# Patient Record
Sex: FEMALE | Race: BLACK OR AFRICAN AMERICAN | NOT HISPANIC OR LATINO | Employment: OTHER | URBAN - METROPOLITAN AREA
[De-identification: names, ages, dates, MRNs, and addresses within clinical notes are randomized per-mention and may not be internally consistent; named-entity substitution may affect disease eponyms.]

---

## 2017-02-15 RX ORDER — SIMVASTATIN 40 MG
TABLET ORAL
Qty: 90 TABLET | Refills: 0 | Status: SHIPPED | OUTPATIENT
Start: 2017-02-15 | End: 2017-07-03 | Stop reason: SDUPTHER

## 2017-03-23 ENCOUNTER — OFFICE VISIT (OUTPATIENT)
Dept: ORTHOPEDIC SURGERY | Facility: CLINIC | Age: 77
End: 2017-03-23

## 2017-03-23 VITALS — BODY MASS INDEX: 37.17 KG/M2 | TEMPERATURE: 97.6 F | WEIGHT: 202 LBS | HEIGHT: 62 IN

## 2017-03-23 DIAGNOSIS — G89.29 CHRONIC PAIN OF LEFT KNEE: Primary | ICD-10-CM

## 2017-03-23 DIAGNOSIS — M17.12 ARTHRITIS OF LEFT KNEE: ICD-10-CM

## 2017-03-23 DIAGNOSIS — Z96.651 STATUS POST TOTAL KNEE REPLACEMENT, RIGHT: ICD-10-CM

## 2017-03-23 DIAGNOSIS — M25.562 CHRONIC PAIN OF LEFT KNEE: Primary | ICD-10-CM

## 2017-03-23 PROCEDURE — 73565 X-RAY EXAM OF KNEES: CPT | Performed by: NURSE PRACTITIONER

## 2017-03-23 PROCEDURE — 73560 X-RAY EXAM OF KNEE 1 OR 2: CPT | Performed by: NURSE PRACTITIONER

## 2017-03-23 PROCEDURE — 20610 DRAIN/INJ JOINT/BURSA W/O US: CPT | Performed by: NURSE PRACTITIONER

## 2017-03-23 RX ORDER — LIDOCAINE HYDROCHLORIDE 10 MG/ML
2 INJECTION, SOLUTION INFILTRATION; PERINEURAL
Status: COMPLETED | OUTPATIENT
Start: 2017-03-23 | End: 2017-03-23

## 2017-03-23 RX ORDER — METHYLPREDNISOLONE ACETATE 80 MG/ML
80 INJECTION, SUSPENSION INTRA-ARTICULAR; INTRALESIONAL; INTRAMUSCULAR; SOFT TISSUE
Status: COMPLETED | OUTPATIENT
Start: 2017-03-23 | End: 2017-03-23

## 2017-03-23 RX ORDER — BUPIVACAINE HYDROCHLORIDE 5 MG/ML
2 INJECTION, SOLUTION PERINEURAL
Status: COMPLETED | OUTPATIENT
Start: 2017-03-23 | End: 2017-03-23

## 2017-03-23 RX ADMIN — LIDOCAINE HYDROCHLORIDE 2 ML: 10 INJECTION, SOLUTION INFILTRATION; PERINEURAL at 10:08

## 2017-03-23 RX ADMIN — BUPIVACAINE HYDROCHLORIDE 2 ML: 5 INJECTION, SOLUTION PERINEURAL at 10:08

## 2017-03-23 RX ADMIN — METHYLPREDNISOLONE ACETATE 80 MG: 80 INJECTION, SUSPENSION INTRA-ARTICULAR; INTRALESIONAL; INTRAMUSCULAR; SOFT TISSUE at 10:08

## 2017-03-23 NOTE — PATIENT INSTRUCTIONS
[facility]    KNEE HOME EXERCISES      It is important that you maintain and possibly improve your strength and range of motion of your knee.      •  Walk frequently for short intervals  •  Using a cane or walker to allow you to walk safely if needed  •  Avoid sitting for long periods of time to avoid cramping and swelling of your leg   •  Do exercises either on a bed or in a chair.  •  If any of the exercises cause you pain, either eliminate that exercise or decrease          the motion or repetitions      Start exercises with 10 repetitions and increase to 30 repetitions.  Do two sets of each exercise each day    ANKLE PUMPS    1. Move your feet up and down as if you are pumping on a gas pedal       STRAIGHT LEG RAISES    1. Lie flat with your injured leg straight.  Keep the other leg bent.  2. Tighten the injured leg's thigh muscle.  3. Lift leg, with knee locked in the straight position no higher than the other knee for  seconds  4. Lower leg slowly. Rest for 5 seconds      SHORT ARC QUAD    1. Lie with both knees bent over a pillow  2. Straighten both knees  3. Hold 5 seconds  4. Bend knees back to starting position and repeat sequence       QUADRICEPS     1. Lie flat with your injured let straight.  Keep the other leg bent.  2. Tighten the injured leg's thigh muscle by trying to move your kneecap toward the  thigh.  3. Make your leg as stiff as you can.  4. Hold for 5 seconds and relax for 5 seconds        HAMSTRING STRETCH    1. Lie flat with your injured leg straight. Keep the other leg bent  2. Press your heel of your injured leg into the floor for 5 seconds       OR  1. Sit with injured leg out straight.   2. Loop a sheet around the ball of foot and toes.  3. Gently pull on the sheet, keeping the leg straight  4. Hold for 10-30 seconds      KNEE FLEXION-EXTENSION SITTING     1. Sit with injured let bent as shown  2. Straighten leg at the knee  3. Hold 5 seconds  4. Bend knee back  to starting position   5. Rest for 2-5 seconds and repeat sequence       HEEL SLIDES     1. Lie on back with legs straight  2. Slide heels toward buttocks  3. Return to starting position       HEEL SLIDS WITH SHEET    1. Lie on your back with a sheet wrapped around your foot  2. Pull on the sheet to assist you in bending your knee and sliding your heel toward  your buttocks  3. Hold for 5 seconds        KNEE FLEXION STRETCH    1. Sit in a chair  2. Bend bad knee back as far as you can   3. Hold stretch for 5-10 seconds      CHAIR PUSH UPS    1. Sit in a chair with arm rests  2. Place hands on armrests  3. Straighten arms, raising buttocks up off of chair         WALL SLIDES    1. Stand with your back and head against a wall.    2. Keep your feet shoulder width apart and 6-12 inches from the wall  3. Slowly slide down the wall until your knees are slightly bent.    4. Hold for 5 seconds and then slowly slide back up  5. As you get stronger, then you can slowly increase the bend in your knees, but do  not drop your buttocks below the level of your knees       STEP UPS    1. Stand with your foot on your injured leg on a 3-5 inch support (such as a block of  wood)  2. Place other foot on the floor  3. Shift your weight onto the foot on the block and try to straighten the knee and  raising the other foot off of the floor  4. Slowly lower your foot until only the heel touches the floor

## 2017-03-23 NOTE — PROGRESS NOTES
Knee follow up      Patient: Loulou Collier  YOB: 1940    Chief Complaints: Left Knee pain    Subjective:    History of Present Illness: Pt gets intermittent  injections with diminishing relief. Is here for repeat injection. Understands options. The pain is a generalized joint line tenderness.  It has been progressive in nature but remains intermittent.  Worsened by prolonged standing or walking and squatting activities. Has had improvement in the past with ice/heat, rest, and injections. KNEE: TIMING:  The pain is described as ACUTE on CHRONIC.  LOCATION: medial joint line tenderness. AGGRAVATING FACTORS:  Is worsened by prolonged standing, sitting, walking and squatting activities.  ASSOCIATED SYMPTOMS:  swelling, tenderness, and aching. OTHER SYMPTOMS denies popping, locking or catching. RELIEVING FACTORS:  Previous treatment has included cortisone injections  OTC Tylenol  OTC meds/NSAIDS  activtiy modification  ice/heat/rest., Has been getting injections with good relief in the past, but now is having limited relief.   Had an episode in the middle of the night a few days ago when she got up to the bathroom with sudden pain and swelling, so bad she almost went to the ER. Swelling and pain have improved since then with linaments and rest but still severely painful on a daily basis at this point and wants to discuss surgery.    Allergies:   Allergies   Allergen Reactions   • Cephalosporins    • Penicillins        Medications:   Home Medications:  Current Outpatient Prescriptions on File Prior to Visit   Medication Sig   • acetaminophen (TYLENOL) 500 MG tablet Take 500 mg by mouth every 6 (six) hours as needed.   • Glucosamine-Chondroit-Vit C-Mn (GLUCOSAMINE CHONDR 500 COMPLEX) capsule Take  by mouth.   • hydrochlorothiazide (MICROZIDE) 12.5 MG capsule TAKE 1 CAPSULE BY MOUTH EVERY DAY   • meloxicam (MOBIC) 15 MG tablet TAKE 1 TABLET BY MOUTH EVERY DAY WITH FOOD   • metoprolol succinate XL (TOPROL-XL)  50 MG 24 hr tablet TAKE 1 TABLET BY MOUTH EVERY DAY   • simvastatin (ZOCOR) 40 MG tablet TAKE 1 TABLET BY MOUTH EVERY NIGHT AT BEDTIME     No current facility-administered medications on file prior to visit.      Current Medications:  Scheduled Meds:  Continuous Infusions:  No current facility-administered medications for this visit.   PRN Meds:.    I have reviewed the patient's medical history in detail and updated the computerized patient record.  Review and summarization of old records include:    Past Medical History:   Diagnosis Date   • Hypertension         Past Surgical History:   Procedure Laterality Date   • BREAST BIOPSY     • HYSTERECTOMY     • KNEE SURGERY          Social History     Occupational History   • Not on file.     Social History Main Topics   • Smoking status: Never Smoker   • Smokeless tobacco: Never Used   • Alcohol use No   • Drug use: No   • Sexual activity: Not on file    Social History     Social History Narrative        Family History   Problem Relation Age of Onset   • Hypertension Other    • Cancer Other    • Diabetes Other    • No Known Problems Mother    • No Known Problems Father    • No Known Problems Sister    • No Known Problems Brother    • No Known Problems Daughter    • No Known Problems Son    • No Known Problems Maternal Grandmother    • No Known Problems Paternal Grandmother    • No Known Problems Maternal Aunt    • No Known Problems Paternal Aunt    • BRCA 1/2 Neg Hx    • Breast cancer Neg Hx    • Colon cancer Neg Hx    • Endometrial cancer Neg Hx    • Ovarian cancer Neg Hx        ROS: 14 point review of systems was performed and was negative except for documented findings in HPI and today's encounter.     Allergies:   Allergies   Allergen Reactions   • Cephalosporins    • Penicillins      Constitutional:  Denies fever, shaking or chills   Eyes:  Denies change in visual acuity   HENT:  Denies nasal congestion or sore throat   Respiratory:  Denies cough or shortness of  breath   Cardiovascular:  Denies chest pain or severe LE edema   GI:  Denies abdominal pain, nausea, vomiting, bloody stools or diarrhea   Musculoskeletal:  Numbness, tingling, or loss of motor function only as noted above in history of present illness.  : Denies painful urination or hematuria  Integument:  Denies rash, lesion or ulceration   Neurologic:  Denies headache or focal weakness  Endocrine:  Denies lymphadenopathy  Psych:  Denies confusion or change in mental status   Hem:  Denies active bleeding    Physical Exam:  Body mass index is 36.95 kg/(m^2).  Vitals:    03/23/17 1008   Temp: 97.6 °F (36.4 °C)     Vital Signs:  reviewed  Constitutional: Awake alert and oriented x3, well developed, no acute distress, non-toxic appearance.  EYES: symmetric, sclera clear  ENT:  Normocephalic, Atraumatic.   Respiratory:  No respiratory distress, No wheezing  CV: pulse regular, no palpitations or pallor.  GI:  Abdomen soft, non-tender.   Vascular:  Intact distal pulses, No cyanosis, no signs or symptoms of DVT.  Neurologic: Sensation grossly intact to the involved extremity, No focal deficits noted.   Neck: No tenderness, Supple.  Integument: warm, dry, no ulcerations.   Psychiatric:  Oriented, no pathological affect.  Musculoskeletal:    Left knee(s):  Painful gait with a subtle limp, positive for synovitis, mod-severe swelling, joint effusion with crepitation.  Lachman negative  Posterior drawer negative  Juliette's negative  Patellofemoral grind +  Sensation grossly intact to light touch throughout the lower extremity  Skin is intact  Distal pulses are palpable  No signs or symptoms of DVT  Right knee doing well with good rom and weight bearing, n/v intact, no swelling, no complaints.       Diagnostic Data:     Imaging was done today and discussed at length with the patient:    Indication: pain related symptoms,  Views: 3V AP, LAT & 40 degree PA left knee(s)   Findings: severe end-stage arthritis (bone on bone,  subchondral sclerosis/cysts, osteophytes), no obvious fracture or acute changes, S/P right  Total Knee Replacement in good position and alignment  Comparison views: viewed last xray done in the office.     Procedure:  Large Joint Arthrocentesis  Date/Time: 3/23/2017 10:08 AM  Consent given by: patient  Site marked: site marked  Timeout: Immediately prior to procedure a time out was called to verify the correct patient, procedure, equipment, support staff and site/side marked as required   Supporting Documentation  Indications: pain and joint swelling   Procedure Details  Location: knee - L knee  Preparation: Patient was prepped and draped in the usual sterile fashion  Needle size: 22 G  Approach: anterolateral  Medications administered: 2 mL bupivacaine; 2 mL lidocaine 1 %; 80 mg methylPREDNISolone acetate 80 MG/ML  Patient tolerance: patient tolerated the procedure well with no immediate complications          Assessment. Severe arthritis left knee(s). S/P R TKA doing well.       Plan: Is to proceed with injection  Follow up as indicated.  Ice, elevate, and rest as needed.  Additional interventions include: Biomechanics of pertinent body area discussed.  Risks, benefits, alternatives, comparisons, and complications of accepted medicines, injections, recommendations, surgical procedures, and therapies explained and education provided in laymen's terms. The patient was given the opportunity to ask questions and they were answerved to their satisfaction.   Natural history and expected course discussed. Questions answered.  Advice on benefits of, and types of regular/moderate exercise.  Lifestyle measures for weight loss with biomechanical explanations for weight loss and how this affects orthopedic condition.  Cortisone Injection. See procedure note.  OTC analgesics as needed with dosage warning and instructions given.  Medications per orders; safety, precautions, and warnings given.  30 min spent face to face with  patient >25 min spent counseling about natural history and expected course of assessed complaint. Questions answered.  TKA: Continuation of conservative management vs. TKA: I reviewed anatomy of a total knee arthroplasty in laymen's terms, as well as typical postoperative recovery and possibly 6-12 months for maximal recovery, and possible need for rehabilitation stay after hospitalization. We also discussed risks, benefits, alternatives, and limitations of procedure with risks including but not limited to neurovascular damage, bleeding, infection, malalignment, chronic pian, failure of implants, osteolysis, loosening of implants, loss of motion, weakness, stiffness, instability, DVT, pulmonary embolus, death, stroke, complex regional pain syndrome, myocardial infarction, and need for additional procedures. Concept of substitution vs. replacement discussed.  No guarantees were given regarding results of surgery.  Patient verbalized understanding, and was given the opportunity to ask and have all questions answered today.   Cortisone injection given for relief until surgery. (see procedure note)  Last time did rehab at [place] went well.  3/23/2017  NAHEDR

## 2017-06-19 ENCOUNTER — OFFICE VISIT (OUTPATIENT)
Dept: INTERNAL MEDICINE | Facility: CLINIC | Age: 77
End: 2017-06-19

## 2017-06-19 VITALS
HEIGHT: 63 IN | WEIGHT: 204 LBS | DIASTOLIC BLOOD PRESSURE: 82 MMHG | HEART RATE: 66 BPM | SYSTOLIC BLOOD PRESSURE: 128 MMHG | BODY MASS INDEX: 36.14 KG/M2

## 2017-06-19 DIAGNOSIS — E78.00 ELEVATED CHOLESTEROL: ICD-10-CM

## 2017-06-19 DIAGNOSIS — I10 BENIGN ESSENTIAL HTN: Primary | ICD-10-CM

## 2017-06-19 DIAGNOSIS — M17.12 PRIMARY OSTEOARTHRITIS OF LEFT KNEE: ICD-10-CM

## 2017-06-19 PROBLEM — G89.29 CHRONIC PAIN OF LEFT KNEE: Status: RESOLVED | Noted: 2017-03-23 | Resolved: 2017-06-19

## 2017-06-19 PROBLEM — M17.10 PRIMARY OSTEOARTHRITIS OF KNEE: Status: ACTIVE | Noted: 2017-06-19

## 2017-06-19 PROBLEM — M25.562 CHRONIC PAIN OF LEFT KNEE: Status: RESOLVED | Noted: 2017-03-23 | Resolved: 2017-06-19

## 2017-06-19 PROCEDURE — 99214 OFFICE O/P EST MOD 30 MIN: CPT | Performed by: INTERNAL MEDICINE

## 2017-06-19 NOTE — PROGRESS NOTES
Subjective   Loulou Collier is a 77 y.o. female.     History of Present Illness she is here today for medical clearance for left total knee replacement to be done on 25 July.  She really is in excellent health.  Her review systems is totally negative.  Specifically she denies any dyspnea on exertion, PND, chest pain, or swelling in the ankles.  She denies any dizziness or focal neurologic symptoms.  Her review systems is totally negative.        Review of Systems   Constitutional: Negative for activity change, appetite change, fatigue and unexpected weight change.   HENT: Negative for trouble swallowing.    Respiratory: Negative for cough, chest tightness, shortness of breath and wheezing.    Cardiovascular: Negative for chest pain, palpitations and leg swelling.   Gastrointestinal: Negative for abdominal pain, anal bleeding, blood in stool, constipation, diarrhea, nausea and vomiting.   Genitourinary: Negative for difficulty urinating, dysuria, flank pain, frequency, hematuria and vaginal bleeding.   Musculoskeletal: Positive for arthralgias. Negative for back pain and gait problem.   Neurological: Negative for dizziness, syncope, facial asymmetry, speech difficulty, weakness, numbness and headaches.   Psychiatric/Behavioral: Negative for confusion and dysphoric mood. The patient is not nervous/anxious.        Objective   Physical Exam   Constitutional: She is oriented to person, place, and time. Vital signs are normal. She appears well-developed and well-nourished. She is active. She does not appear ill.   Eyes: Conjunctivae are normal.   Fundoscopic exam:       The right eye shows no AV nicking, no exudate and no hemorrhage.        The left eye shows no AV nicking, no exudate and no hemorrhage.   Neck: Carotid bruit is not present. No thyroid mass and no thyromegaly present.   Cardiovascular: Normal rate, regular rhythm, S1 normal and S2 normal.  Exam reveals no S3 and no S4.    No murmur heard.  Pulses:        Posterior tibial pulses are 2+ on the right side, and 2+ on the left side.   Pulmonary/Chest: No tachypnea. No respiratory distress. She has no decreased breath sounds. She has no wheezes. She has no rhonchi. She has no rales.   Abdominal: Soft. Normal appearance and bowel sounds are normal. She exhibits no abdominal bruit and no mass. There is no hepatosplenomegaly. There is no tenderness.       Vascular Status -  Her exam exhibits no right foot edema. Her exam exhibits no left foot edema.  Neurological: She is alert and oriented to person, place, and time. Gait normal.   Reflex Scores:       Bicep reflexes are 2+ on the right side.  Psychiatric: She has a normal mood and affect. Her speech is normal and behavior is normal. Judgment and thought content normal. Cognition and memory are normal.       Assessment/Plan August 2016 lab was totally normal including LDL cholesterol    Assessment #1 hypertension-good control #2 hypercholesterolemia-good control #3 osteoarthritis-she is a good candidate for left total knee replacement.    Plan #1 no change medication #2 fax surgical clearance to her orthopedist.

## 2017-07-03 RX ORDER — SIMVASTATIN 40 MG
TABLET ORAL
Qty: 90 TABLET | Refills: 0 | Status: SHIPPED | OUTPATIENT
Start: 2017-07-03 | End: 2017-07-14

## 2017-07-12 ENCOUNTER — HOSPITAL ENCOUNTER (OUTPATIENT)
Dept: CARDIOLOGY | Facility: HOSPITAL | Age: 77
Discharge: HOME | End: 2017-07-12
Attending: ORTHOPAEDIC SURGERY | Admitting: ORTHOPAEDIC SURGERY

## 2017-07-12 DIAGNOSIS — Z01.818 PRE-OP TESTING: ICD-10-CM

## 2017-07-12 PROCEDURE — 93005 ELECTROCARDIOGRAM TRACING: CPT | Performed by: ORTHOPAEDIC SURGERY

## 2017-07-12 PROCEDURE — 93010 ELECTROCARDIOGRAM REPORT: CPT | Performed by: INTERNAL MEDICINE

## 2017-07-13 ENCOUNTER — TELEPHONE (OUTPATIENT)
Dept: ORTHOPEDIC SURGERY | Facility: CLINIC | Age: 77
End: 2017-07-13

## 2017-07-13 NOTE — TELEPHONE ENCOUNTER
PLEASE PUT ORDER IN FOR PATIENT, SHE IS HAVING SX AUG 2, AND PT IS HAVING PATS TOMRW AND NEED ORDERS,       THANKS,    GINGER

## 2017-07-14 ENCOUNTER — PREP FOR SURGERY (OUTPATIENT)
Dept: OTHER | Facility: HOSPITAL | Age: 77
End: 2017-07-14

## 2017-07-14 ENCOUNTER — APPOINTMENT (OUTPATIENT)
Dept: PREADMISSION TESTING | Facility: HOSPITAL | Age: 77
End: 2017-07-14

## 2017-07-14 VITALS
TEMPERATURE: 97.9 F | RESPIRATION RATE: 16 BRPM | SYSTOLIC BLOOD PRESSURE: 136 MMHG | DIASTOLIC BLOOD PRESSURE: 85 MMHG | OXYGEN SATURATION: 95 % | HEIGHT: 62 IN | WEIGHT: 206 LBS | HEART RATE: 74 BPM | BODY MASS INDEX: 37.91 KG/M2

## 2017-07-14 DIAGNOSIS — M17.12 ARTHRITIS OF LEFT KNEE: Primary | ICD-10-CM

## 2017-07-14 DIAGNOSIS — M17.12 ARTHRITIS OF LEFT KNEE: ICD-10-CM

## 2017-07-14 LAB
ANION GAP SERPL CALCULATED.3IONS-SCNC: 13 MMOL/L
BACTERIA UR QL AUTO: ABNORMAL /HPF
BILIRUB UR QL STRIP: NEGATIVE
BUN BLD-MCNC: 23 MG/DL (ref 8–23)
BUN/CREAT SERPL: 20.9 (ref 7–25)
CALCIUM SPEC-SCNC: 9.2 MG/DL (ref 8.6–10.5)
CHLORIDE SERPL-SCNC: 103 MMOL/L (ref 98–107)
CLARITY UR: CLEAR
CO2 SERPL-SCNC: 27 MMOL/L (ref 22–29)
COLOR UR: YELLOW
CREAT BLD-MCNC: 1.1 MG/DL (ref 0.57–1)
DEPRECATED RDW RBC AUTO: 51.4 FL (ref 37–54)
ERYTHROCYTE [DISTWIDTH] IN BLOOD BY AUTOMATED COUNT: 16.4 % (ref 11.7–13)
GFR SERPL CREATININE-BSD FRML MDRD: 59 ML/MIN/1.73
GLUCOSE BLD-MCNC: 95 MG/DL (ref 65–99)
GLUCOSE UR STRIP-MCNC: NEGATIVE MG/DL
HCT VFR BLD AUTO: 39.6 % (ref 35.6–45.5)
HGB BLD-MCNC: 12.6 G/DL (ref 11.9–15.5)
HGB UR QL STRIP.AUTO: NEGATIVE
HYALINE CASTS UR QL AUTO: ABNORMAL /LPF
KETONES UR QL STRIP: NEGATIVE
LEUKOCYTE ESTERASE UR QL STRIP.AUTO: ABNORMAL
MCH RBC QN AUTO: 27.3 PG (ref 26.9–32)
MCHC RBC AUTO-ENTMCNC: 31.8 G/DL (ref 32.4–36.3)
MCV RBC AUTO: 85.9 FL (ref 80.5–98.2)
NITRITE UR QL STRIP: NEGATIVE
PH UR STRIP.AUTO: 5.5 [PH] (ref 5–8)
PLATELET # BLD AUTO: 208 10*3/MM3 (ref 140–500)
PMV BLD AUTO: 10.8 FL (ref 6–12)
POTASSIUM BLD-SCNC: 3.8 MMOL/L (ref 3.5–5.2)
PROT UR QL STRIP: NEGATIVE
RBC # BLD AUTO: 4.61 10*6/MM3 (ref 3.9–5.2)
RBC # UR: ABNORMAL /HPF
REF LAB TEST METHOD: ABNORMAL
SODIUM BLD-SCNC: 143 MMOL/L (ref 136–145)
SP GR UR STRIP: 1.02 (ref 1–1.03)
SQUAMOUS #/AREA URNS HPF: ABNORMAL /HPF
UROBILINOGEN UR QL STRIP: ABNORMAL
WBC NRBC COR # BLD: 5.53 10*3/MM3 (ref 4.5–10.7)
WBC UR QL AUTO: ABNORMAL /HPF

## 2017-07-14 PROCEDURE — 81001 URINALYSIS AUTO W/SCOPE: CPT | Performed by: ORTHOPAEDIC SURGERY

## 2017-07-14 PROCEDURE — 36415 COLL VENOUS BLD VENIPUNCTURE: CPT

## 2017-07-14 PROCEDURE — 87086 URINE CULTURE/COLONY COUNT: CPT | Performed by: ORTHOPAEDIC SURGERY

## 2017-07-14 PROCEDURE — 85027 COMPLETE CBC AUTOMATED: CPT | Performed by: ORTHOPAEDIC SURGERY

## 2017-07-14 PROCEDURE — 80048 BASIC METABOLIC PNL TOTAL CA: CPT | Performed by: ORTHOPAEDIC SURGERY

## 2017-07-14 RX ORDER — MELOXICAM 15 MG/1
15 TABLET ORAL EVERY MORNING
COMMUNITY
End: 2017-08-04 | Stop reason: HOSPADM

## 2017-07-14 RX ORDER — SIMVASTATIN 40 MG
40 TABLET ORAL NIGHTLY
COMMUNITY
End: 2018-12-05 | Stop reason: SDUPTHER

## 2017-07-14 RX ORDER — METOPROLOL SUCCINATE 50 MG/1
50 TABLET, EXTENDED RELEASE ORAL EVERY MORNING
COMMUNITY
End: 2017-08-28

## 2017-07-14 RX ORDER — HYDROCHLOROTHIAZIDE 12.5 MG/1
12.5 TABLET ORAL EVERY MORNING
COMMUNITY
End: 2017-09-19 | Stop reason: DRUGHIGH

## 2017-07-14 NOTE — DISCHARGE INSTRUCTIONS
Take the following medications the morning of surgery with a small sip of water:  METOPROLOL        General Instructions:  • Do not eat solid food after midnight the night before surgery.  • You may drink clear liquids day of surgery but must stop at least one hour before your hospital arrival time.  • It is beneficial for you to have a clear drink that contains carbohydrates the day of surgery.  We suggest a 20 ounce bottle of Gatorade or Powerade for non-diabetic patients or a 20 ounce bottle of G2 or Powerade Zero for diabetic patients. (Pediatric patients, are not advised to drink a 20 ounce carbohydrate drink)    Clear liquids are liquids you can see through.  Nothing red in color.     Plain water                               Sports drinks  Sodas                                   Gelatin (Jell-O)  Fruit juices without pulp such as white grape juice and apple juice  Popsicles that contain no fruit or yogurt  Tea or coffee (no cream or milk added)  Gatorade / Powerade  G2 / Powerade Zero    • Infants may have breast milk up to four hours before surgery.  • Infants drinking formula may drink formula up to six hours before surgery.   • Patients who avoid smoking, chewing tobacco and alcohol for 4 weeks prior to surgery have a reduced risk of post-operative complications.  Quit smoking as many days before surgery as you can.  • Do not smoke, use chewing tobacco or drink alcohol the day of surgery.   • If applicable bring your C-PAP/ BI-PAP machine.  • Bring any papers given to you in the doctor’s office.  • Wear clean comfortable clothes and socks.  • Do not wear contact lenses or make-up.  Bring a case for your glasses.   • Bring crutches or walker if applicable.  • Leave all other valuables and jewelry at home.  • The Pre-Admission Testing nurse will instruct you to bring medications if unable to obtain an accurate list in Pre-Admission Testing.        If you were given a blood bank ID arm band remember to bring  it with you the day of surgery.    Preventing a Surgical Site Infection:  • For 2 to 3 days before surgery, avoid shaving with a razor because the razor can irritate skin and make it easier to develop an infection.  • The night prior to surgery sleep in a clean bed with clean clothing.  Do not allow pets to sleep with you.  • Shower on the morning of surgery using a fresh bar of anti-bacterial soap (such as Dial) and clean washcloth.  Dry with a clean towel and dress in clean clothing.  • Ask your surgeon if you will be receiving antibiotics prior to surgery.  • Make sure you, your family, and all healthcare providers clean their hands with soap and water or an alcohol based hand  before caring for you or your wound.    Day of surgery:  Upon arrival, a Pre-op nurse and Anesthesiologist will review your health history, obtain vital signs, and answer questions you may have.  The only belongings needed at this time will be your home medications and if applicable your C-PAP/BI-PAP machine.  If you are staying overnight your family can leave the rest of your belongings in the car and bring them to your room later.  A Pre-op nurse will start an IV and you may receive medication in preparation for surgery, including something to help you relax.  Your family will be able to see you in the Pre-op area.  While you are in surgery your family should notify the waiting room  if they leave the waiting room area and provide a contact phone number.    Please be aware that surgery does come with discomfort.  We want to make every effort to control your discomfort so please discuss any uncontrolled symptoms with your nurse.   Your doctor will most likely have prescribed pain medications.      If you are going home after surgery you will receive individualized written care instructions before being discharged.  A responsible adult must drive you to and from the hospital on the day of your surgery and stay with you  for 24 hours.    If you are staying overnight following surgery, you will be transported to your hospital room following the recovery period.  [facility] has all private rooms.    If you have any questions please call Pre-Admission Testing at [phone].  Deductibles and co-payments are collected on the day of service. Please be prepared to pay the required co-pay, deductible or deposit on the day of service as defined by your plan.    2% CHLORAHEXIDINE GLUCONATE* CLOTH  Preparing or “prepping” skin before surgery can reduce the risk of infection at the surgical site. To make the process easier, [facility] has chosen disposable cloths moistened with a rinse-free, 2% Chlorhexidine Gluconate (CHG) antiseptic solution. The steps below outline the prepping process and should be carefully followed.        Use the prep cloth on the area that is circled in the diagram             Directions Night before Surgery  1) Shower using a fresh bar of anti-bacterial soap (such as Dial) and clean washcloth.  Use a clean towel to completely dry your skin.  2) Do not use any lotions, oils or creams on your skin.  3) Open the package and remove 1 cloth, wipe your skin for 30 seconds in a circular motion.  Allow to dry for 3 minutes.  4) Repeat #3 with second cloth.  5) Do not touch your eyes, ears, or mouth with the prep cloth.  6) Allow the wet prep solution to air dry.  7) Discard the prep cloth and wash your hands with soap and water.   8) Dress in clean bed clothes and sleep on fresh clean bed sheets.   9) You may experience some temporary itching after the prep.    Directions Day of Surgery  1) Repeat steps 1,2,3,4,5,6,7, and 9.   2) Dress in clean clothes before coming to the hospital.    BACTROBAN NASAL OINTMENT  There are many germs normally in your nose. Bactroban is an ointment that will help reduce these germs. Please follow these instructions for Bactroban use:    ____Two days before surgery in  the evening Date________    ____The day before surgery in the morning  Date________    ____The day before surgery in the evening              Date________    ____The day of surgery in the morning    Date________    **Squirt ½ package of Bactroban Ointment onto a cotton applicator and apply to inside of 1st nostril.  Squirt the remaining Bactroban and apply to the inside of the other nostril.    PERIDEX- ORAL:  Use only if your surgeon has ordered  Use the night before and morning of surgery - Swish, gargle, and spit - do not swallow.

## 2017-07-16 LAB — BACTERIA SPEC AEROBE CULT: NORMAL

## 2017-07-17 ENCOUNTER — OFFICE VISIT (OUTPATIENT)
Dept: ORTHOPEDIC SURGERY | Facility: CLINIC | Age: 77
End: 2017-07-17

## 2017-07-17 VITALS — TEMPERATURE: 97.6 F | HEIGHT: 62 IN | BODY MASS INDEX: 37.73 KG/M2 | WEIGHT: 205 LBS

## 2017-07-17 DIAGNOSIS — M17.12 PRIMARY OSTEOARTHRITIS OF LEFT KNEE: Primary | ICD-10-CM

## 2017-07-17 PROCEDURE — S0260 H&P FOR SURGERY: HCPCS | Performed by: NURSE PRACTITIONER

## 2017-07-17 NOTE — PATIENT INSTRUCTIONS
[facility]    KNEE HOME EXERCISES      It is important that you maintain and possibly improve your strength and range of motion of your knee.      •  Walk frequently for short intervals  •  Using a cane or walker to allow you to walk safely if needed  •  Avoid sitting for long periods of time to avoid cramping and swelling of your leg   •  Do exercises either on a bed or in a chair.  •  If any of the exercises cause you pain, either eliminate that exercise or decrease          the motion or repetitions      Start exercises with 10 repetitions and increase to 30 repetitions.  Do two sets of each exercise each day    ANKLE PUMPS    1. Move your feet up and down as if you are pumping on a gas pedal       STRAIGHT LEG RAISES    1. Lie flat with your injured leg straight.  Keep the other leg bent.  2. Tighten the injured leg's thigh muscle.  3. Lift leg, with knee locked in the straight position no higher than the other knee for  seconds  4. Lower leg slowly. Rest for 5 seconds      SHORT ARC QUAD    1. Lie with both knees bent over a pillow  2. Straighten both knees  3. Hold 5 seconds  4. Bend knees back to starting position and repeat sequence       QUADRICEPS     1. Lie flat with your injured let straight.  Keep the other leg bent.  2. Tighten the injured leg's thigh muscle by trying to move your kneecap toward the  thigh.  3. Make your leg as stiff as you can.  4. Hold for 5 seconds and relax for 5 seconds        HAMSTRING STRETCH    1. Lie flat with your injured leg straight. Keep the other leg bent  2. Press your heel of your injured leg into the floor for 5 seconds       OR  1. Sit with injured leg out straight.   2. Loop a sheet around the ball of foot and toes.  3. Gently pull on the sheet, keeping the leg straight  4. Hold for 10-30 seconds      KNEE FLEXION-EXTENSION SITTING     1. Sit with injured let bent as shown  2. Straighten leg at the knee  3. Hold 5 seconds  4. Bend knee back  to starting position   5. Rest for 2-5 seconds and repeat sequence       HEEL SLIDES     1. Lie on back with legs straight  2. Slide heels toward buttocks  3. Return to starting position       HEEL SLIDS WITH SHEET    1. Lie on your back with a sheet wrapped around your foot  2. Pull on the sheet to assist you in bending your knee and sliding your heel toward  your buttocks  3. Hold for 5 seconds        KNEE FLEXION STRETCH    1. Sit in a chair  2. Bend bad knee back as far as you can   3. Hold stretch for 5-10 seconds      CHAIR PUSH UPS    1. Sit in a chair with arm rests  2. Place hands on armrests  3. Straighten arms, raising buttocks up off of chair         WALL SLIDES    1. Stand with your back and head against a wall.    2. Keep your feet shoulder width apart and 6-12 inches from the wall  3. Slowly slide down the wall until your knees are slightly bent.    4. Hold for 5 seconds and then slowly slide back up  5. As you get stronger, then you can slowly increase the bend in your knees, but do  not drop your buttocks below the level of your knees       STEP UPS    1. Stand with your foot on your injured leg on a 3-5 inch support (such as a block of  wood)  2. Place other foot on the floor  3. Shift your weight onto the foot on the block and try to straighten the knee and  raising the other foot off of the   Total Knee Replacement  Total knee replacement is a procedure to replace the knee joint with an artificial (prosthetic) knee joint. The purpose of this surgery is to reduce knee pain and improve knee function.   The prosthetic knee joint (prosthesis) is usually made of metal and plastic. It replaces parts of the thigh bone (femur), lower leg bone (tibia), and kneecap (patella) that are removed during the procedure.  LET YOUR HEALTH CARE PROVIDER KNOW ABOUT:  · Any allergies you have.  · All medicines you are taking, including vitamins, herbs, eye drops, creams, and over-the-counter medicines.  · Previous  problems you or members of your family have had with the use of anesthetics.  · Any blood disorders you have.  · Previous surgeries you have had.  · Any medical conditions you have.  · Whether you are pregnant or may be pregnant.  RISKS AND COMPLICATIONS  Generally, this is a safe procedure. However, problems may occur, including:  · Infection.  · Bleeding.  · Allergic reactions to medicines.  · Damage to other structures or organs.  · Decreased range of motion of the knee.  · Instability of the knee.  · Loosening of the prosthetic joint.  · Knee pain that does not go away (chronic pain).   BEFORE THE PROCEDURE  · Ask your health care provider about:    Changing or stopping your regular medicines. This is especially important if you are taking diabetes medicines or blood thinners.    Taking medicines such as aspirin and ibuprofen. These medicines can thin your blood. Do not take these medicines before your procedure if your health care provider instructs you not to.  · Have dental care and routine cleanings completed before your procedure. Plan to not have dental work done for 3 months after your procedure. Germs from anywhere in your body, including your mouth, can travel to your new joint and infect it.  · Follow instructions from your health care provider about eating or drinking restrictions.  · Ask your health care provider how your surgical site will be marked or identified.  · You may be given antibiotic medicine to help prevent infection.  · If your health care provider prescribes physical therapy, do exercises as instructed.  · Do not use any tobacco products, such as cigarettes, chewing tobacco, or e-cigarettes. If you need help quitting, ask your health care provider.  · You may have a physical exam.  · You may have tests, such as:    X-rays.    MRI.    CT scan.    Bone scans.  · You may have a blood or urine sample taken.  · Plan to have someone take you home after the procedure.  · If you will be going  home right after the procedure, plan to have someone with you for at least 24 hours. It is recommended that you have someone to help care for you for at least 4-6 weeks after your procedure.  PROCEDURE  · To reduce your risk of infection:  ¨ Your health care team will wash or sanitize their hands.  ¨ Your skin will be washed with soap.  · An IV tube will be inserted into one of your veins.  · You will be given one or more of the following:  ¨ A medicine to help you relax (sedative).  ¨ A medicine to numb the area (local anesthetic).  ¨ A medicine to make you fall asleep (general anesthetic).  ¨ A medicine that is injected into your spine to numb the area below and slightly above the injection site (spinal anesthetic).  ¨ A medicine that is injected into an area of your body to numb everything below the injection site (regional anesthetic).    · An incision will be made in your knee.  · Damaged cartilage and bone will be removed from your femur, tibia, and patella.  · Parts of the prosthesis (liners) will be placed over the areas of bone and cartilage that were removed. A metal liner will be placed over your femur, and plastic liners will be placed over your tibia and the underside of your patella.  · One or more small tubes (drains) may be placed near your incision to help drain extra fluid from your surgical site.  · Your incision will be closed with stitches (sutures), skin glue, or adhesive strips. Medicine may be applied to your incision.  · A bandage (dressing) will be placed over your incision.  The procedure may vary among health care providers and hospitals.  AFTER THE PROCEDURE  · Your blood pressure, heart rate, breathing rate, and blood oxygen level will be monitored often until the medicines you were given have worn off.  · You may continue to receive fluids and medicines through an IV tube.  · You will have some pain. Pain medicines will be available to help you.  · You may have fluid coming from one or  more drains in your incision.  · You may have to wear compression stockings. These stockings help to prevent blood clots and reduce swelling in your legs.  · You will be encouraged to move around as much as possible.  · You may be given a continuous passive motion machine to use at home. You will be shown how to use this machine.  · Do not drive for 24 hours if you received a sedative.     This information is not intended to replace advice given to you by your health care provider. Make sure you discuss any questions you have with your health care provider.     Document Released: 03/26/2002 Document Revised: 04/10/2017 Document Reviewed: 11/23/2016  [employer] Patient Education ©2017 [employer]  floor  4. Slowly lower your foot until only the heel touches the floor

## 2017-07-17 NOTE — PROGRESS NOTES
History & Physical       Patient: Loulou Collier  YOB: 1940  Medical Record Number: [id number]  Body mass index is 37.49 kg/(m^2).    Surgeon:  Dr. Rui Craft    Chief Complaints:   Chief Complaint   Patient presents with   • Left Knee - Follow-up       Subjective:    History of Present Illness: 77 y.o. female presents with   Chief Complaint   Patient presents with   • Left Knee - Follow-up   . Onset of symptoms was years ago and has been progressively worsening despite more conservative treatment measures.  Symptoms are associated with ability to move, exercise, and perform activities of daily living.  Symptoms are aggravated by weight bearing and ROM necessary for activities of daily living.   Symptoms improve with rest, ice and elevation only minimally.      Allergies:   Allergies   Allergen Reactions   • Cephalosporins Hives   • Penicillins Hives       Medications:   Home Medications:  Current Outpatient Prescriptions on File Prior to Visit   Medication Sig   • acetaminophen (TYLENOL) 500 MG tablet Take 500 mg by mouth every 6 (six) hours as needed.   • Chlorhexidine Gluconate 2 % pads Apply 1 application topically 3 (Three) Times a Day. PRE OP   • Glucosamine-Chondroit-Vit C-Mn (GLUCOSAMINE CHONDR 500 COMPLEX) capsule Take 1 tablet by mouth Every Morning.   • hydrochlorothiazide (HYDRODIURIL) 12.5 MG tablet Take 12.5 mg by mouth Every Morning.   • meloxicam (MOBIC) 15 MG tablet Take 15 mg by mouth Every Morning.   • metoprolol succinate XL (TOPROL-XL) 50 MG 24 hr tablet Take 50 mg by mouth Every Morning.   • mupirocin (BACTROBAN) 2 % ointment Apply 1 application topically 3 (Three) Times a Day. PRE OP   • simvastatin (ZOCOR) 40 MG tablet Take 40 mg by mouth Every Night.     No current facility-administered medications on file prior to visit.      Current Medications:  Scheduled Meds:  Continuous Infusions:  No current facility-administered medications for this visit.   PRN Meds:.    I  have reviewed the patient's medical history in detail and updated the computerized patient record.  Review and summarization of old records include:    Past Medical History:   Diagnosis Date   • Arthritis    • High cholesterol    • Hypertension         Past Surgical History:   Procedure Laterality Date   • BREAST BIOPSY     • HYSTERECTOMY     • JOINT REPLACEMENT      RT KNEE   • KNEE SURGERY          Social History     Occupational History   • Not on file.     Social History Main Topics   • Smoking status: Never Smoker   • Smokeless tobacco: Never Used   • Alcohol use No   • Drug use: No   • Sexual activity: Not on file    Social History     Social History Narrative        Family History   Problem Relation Age of Onset   • Hypertension Other    • Cancer Other    • Diabetes Other    • No Known Problems Mother    • No Known Problems Father    • No Known Problems Sister    • No Known Problems Brother    • No Known Problems Daughter    • No Known Problems Son    • No Known Problems Maternal Grandmother    • No Known Problems Paternal Grandmother    • No Known Problems Maternal Aunt    • No Known Problems Paternal Aunt    • BRCA 1/2 Neg Hx    • Breast cancer Neg Hx    • Colon cancer Neg Hx    • Endometrial cancer Neg Hx    • Ovarian cancer Neg Hx    • Malig Hyperthermia Neg Hx        ROS: 14 point review of systems was performed and was negative except for documented findings in HPI and today's encounter.     Allergies:   Allergies   Allergen Reactions   • Cephalosporins Hives   • Penicillins Hives     Constitutional:  Denies fever, shaking or chills   Eyes:  Denies change in visual acuity   HENT:  Denies nasal congestion or sore throat   Respiratory:  Denies cough or shortness of breath   Cardiovascular:  Denies chest pain or severe LE edema   GI:  Denies abdominal pain, nausea, vomiting, bloody stools or diarrhea   Musculoskeletal:  Denies numbness tingling or loss of motor function except as outlined above in history  of present illness.  : Denies painful urination or hematuria  Integument:  Denies rash, lesion or ulceration   Neurologic:  Denies headache or focal weakness  Endocrine:  Denies lymphadenopathy  Psych:  Denies confusion or change in mental status   Hem:  Denies active bleeding    Physical Exam: 77 y.o. female  Body mass index is 37.49 kg/(m^2)., @HT@, @WT@  Vitals:    07/17/17 0901   Temp: 97.6 °F (36.4 °C)     Vital signs reviewed.   General Appearance:    Alert, cooperative, in no acute distress                  Eyes: conjunctiva clear  ENT: external ears and nose atraumatic  CV: no peripheral edema  Resp: normal respiratory effort  Skin: no rashes or wounds; normal turgor  Psych: mood and affect appropriate  Lymph: no nodes appreciated  Neuro: gross sensation intact  Vascular:  Palpable peripheral pulse in noted extremity  Musculoskeletal Extremities: DETAILED KNEE Exam: Left knee: Painful gait w/wo limp, muscle atrophy, erythema, ecchymosis, or gross deformity noted, Large knee effusion, + medial and lateral joint line tenderness, Active range of motion normal, 5/5 strength flexion and extension, The knee is stable to varus and valgus stress testing, VARUS VALGUS NEUTRAL: varus alignment of the limb, Lachman negative, Posterior drawer negative, Juliette's negative, Patellofemoral grind +, Sensation grossly intact to light tough throughout the lower extremity, Skin is intact, Distal pulses are palpable, No signs or symptoms of DVT          Diagnostic Tests:  Appointment on 07/14/2017   Component Date Value Ref Range Status   • Glucose 07/14/2017 95  65 - 99 mg/dL Final   • BUN 07/14/2017 23  8 - 23 mg/dL Final   • Creatinine 07/14/2017 1.10* 0.57 - 1.00 mg/dL Final   • Sodium 07/14/2017 143  136 - 145 mmol/L Final   • Potassium 07/14/2017 3.8  3.5 - 5.2 mmol/L Final   • Chloride 07/14/2017 103  98 - 107 mmol/L Final   • CO2 07/14/2017 27.0  22.0 - 29.0 mmol/L Final   • Calcium 07/14/2017 9.2  8.6 - 10.5 mg/dL  Final   • eGFR   Amer 07/14/2017 59* >60 mL/min/1.73 Final   • BUN/Creatinine Ratio 07/14/2017 20.9  7.0 - 25.0 Final   • Anion Gap 07/14/2017 13.0  mmol/L Final   • WBC 07/14/2017 5.53  4.50 - 10.70 10*3/mm3 Final   • RBC 07/14/2017 4.61  3.90 - 5.20 10*6/mm3 Final   • Hemoglobin 07/14/2017 12.6  11.9 - 15.5 g/dL Final   • Hematocrit 07/14/2017 39.6  35.6 - 45.5 % Final   • MCV 07/14/2017 85.9  80.5 - 98.2 fL Final   • MCH 07/14/2017 27.3  26.9 - 32.0 pg Final   • MCHC 07/14/2017 31.8* 32.4 - 36.3 g/dL Final   • RDW 07/14/2017 16.4* 11.7 - 13.0 % Final   • RDW-SD 07/14/2017 51.4  37.0 - 54.0 fl Final   • MPV 07/14/2017 10.8  6.0 - 12.0 fL Final   • Platelets 07/14/2017 208  140 - 500 10*3/mm3 Final   • Color, UA 07/14/2017 Yellow  Yellow, Straw Final   • Appearance, UA 07/14/2017 Clear  Clear Final   • pH, UA 07/14/2017 5.5  5.0 - 8.0 Final   • Specific Gravity, UA 07/14/2017 1.018  1.005 - 1.030 Final   • Glucose, UA 07/14/2017 Negative  Negative Final   • Ketones, UA 07/14/2017 Negative  Negative Final   • Bilirubin, UA 07/14/2017 Negative  Negative Final   • Blood, UA 07/14/2017 Negative  Negative Final   • Protein, UA 07/14/2017 Negative  Negative Final   • Leuk Esterase, UA 07/14/2017 Moderate (2+)* Negative Final   • Nitrite, UA 07/14/2017 Negative  Negative Final   • Urobilinogen, UA 07/14/2017 0.2 E.U./dL  0.2 - 1.0 E.U./dL Final   • RBC, UA 07/14/2017 0-2  None Seen, 0-2 /HPF Final   • WBC, UA 07/14/2017 6-12* None Seen, 0-2 /HPF Final   • Bacteria, UA 07/14/2017 None Seen  None Seen /HPF Final   • Squamous Epithelial Cells, UA 07/14/2017 3-6* None Seen, 0-2 /HPF Final   • Hyaline Casts, UA 07/14/2017 None Seen  None Seen /LPF Final   • Methodology 07/14/2017 Automated Microscopy   Final   • Urine Culture 07/14/2017 25,000 CFU/mL Mixed Culture   Final     No results found.    Imaging was done previously in the office and discussed at length with the patient:    Indication: pain related  symptoms,  Views: 3V AP, LAT & 40 degree PA left knee(s)   Findings: severe end-stage arthritis (bone on bone, subchondral sclerosis/cysts, osteophytes), S/P right  Total Knee Replacement in good position and alignment  Comparison views: viewed last xray done in the office.     Assessment: endstage arthritis of left knee  Patient Active Problem List   Diagnosis   • Elevated cholesterol   • Benign essential HTN   • H/O hysterectomy for benign disease   • Status post total knee replacement, right   • Primary osteoarthritis of knee       Plan:  Dr. Rui Craft reviewed anatomy of a total joint arthroplasty in laymen's terms, as well as typical postoperative recovery and possibly 6-12 months for maximal recovery, and possible need for rehabilitation stay after hospitalization. We also discussed risks, benefits, alternatives, and limitations of procedure with risks including but not limited to neurovascular damage, bleeding, infection, malalignment, chronic pian, failure of implants, osteolysis, loosening of implants, loss of motion, weakness, stiffness, instability, DVT, pulmonary embolus, death, stroke, complex regional pain syndrome, myocardial infarction, and need for additional procedures. Concept of substitution vs. replacement discussed.  No guarantees were given regarding results of surgery.      Loulou Collier was given the opportunity to ask and have all questions answered today.  The patient voiced understanding of the risks, benefits, and alternative forms of treatment that were discussed and the patient consents to proceed with surgery.     Patient's blood clot history is negative.  Planned DVT prophylaxis for surgery:  Aspirin    Discharge Plan: POD 2-3 to home and home health, was cleared by Dr. Gurpreet Valenzuela    Patient was seen by DARON Villafana in the office today.    Date: 7/17/2017  DARON Lester

## 2017-08-02 ENCOUNTER — APPOINTMENT (OUTPATIENT)
Dept: GENERAL RADIOLOGY | Facility: HOSPITAL | Age: 77
End: 2017-08-02

## 2017-08-02 ENCOUNTER — ANESTHESIA (OUTPATIENT)
Dept: PERIOP | Facility: HOSPITAL | Age: 77
End: 2017-08-02

## 2017-08-02 ENCOUNTER — HOSPITAL ENCOUNTER (INPATIENT)
Facility: HOSPITAL | Age: 77
LOS: 2 days | Discharge: NURSING, REHAB OR LONG-TERM CARE | End: 2017-08-04
Attending: ORTHOPAEDIC SURGERY | Admitting: ORTHOPAEDIC SURGERY

## 2017-08-02 ENCOUNTER — ANESTHESIA EVENT (OUTPATIENT)
Dept: PERIOP | Facility: HOSPITAL | Age: 77
End: 2017-08-02

## 2017-08-02 DIAGNOSIS — M17.12 ARTHRITIS OF LEFT KNEE: ICD-10-CM

## 2017-08-02 PROBLEM — G89.29 CHRONIC PAIN OF LEFT KNEE: Status: ACTIVE | Noted: 2017-08-02

## 2017-08-02 PROBLEM — M25.562 CHRONIC PAIN OF LEFT KNEE: Status: ACTIVE | Noted: 2017-08-02

## 2017-08-02 PROCEDURE — 25010000002 DEXAMETHASONE PER 1 MG: Performed by: NURSE ANESTHETIST, CERTIFIED REGISTERED

## 2017-08-02 PROCEDURE — 97161 PT EVAL LOW COMPLEX 20 MIN: CPT

## 2017-08-02 PROCEDURE — 20985 CPTR-ASST DIR MS PX: CPT | Performed by: ORTHOPAEDIC SURGERY

## 2017-08-02 PROCEDURE — 25010000002 KETOROLAC TROMETHAMINE PER 15 MG: Performed by: ORTHOPAEDIC SURGERY

## 2017-08-02 PROCEDURE — C1776 JOINT DEVICE (IMPLANTABLE): HCPCS | Performed by: ORTHOPAEDIC SURGERY

## 2017-08-02 PROCEDURE — 73560 X-RAY EXAM OF KNEE 1 OR 2: CPT

## 2017-08-02 PROCEDURE — 25010000002 ROPIVACAINE PER 1 MG: Performed by: ORTHOPAEDIC SURGERY

## 2017-08-02 PROCEDURE — 0SRD0J9 REPLACEMENT OF LEFT KNEE JOINT WITH SYNTHETIC SUBSTITUTE, CEMENTED, OPEN APPROACH: ICD-10-PCS | Performed by: ORTHOPAEDIC SURGERY

## 2017-08-02 PROCEDURE — 25010000002 PHENYLEPHRINE PER 1 ML: Performed by: NURSE ANESTHETIST, CERTIFIED REGISTERED

## 2017-08-02 PROCEDURE — 25010000002 HYDROMORPHONE PER 4 MG: Performed by: NURSE ANESTHETIST, CERTIFIED REGISTERED

## 2017-08-02 PROCEDURE — 25010000002 MORPHINE (PF) 10 MG/ML SOLUTION 1 ML VIAL: Performed by: ORTHOPAEDIC SURGERY

## 2017-08-02 PROCEDURE — C1713 ANCHOR/SCREW BN/BN,TIS/BN: HCPCS | Performed by: ORTHOPAEDIC SURGERY

## 2017-08-02 PROCEDURE — 25010000002 PROPOFOL 10 MG/ML EMULSION: Performed by: NURSE ANESTHETIST, CERTIFIED REGISTERED

## 2017-08-02 PROCEDURE — 25010000002 HYDROMORPHONE PER 4 MG

## 2017-08-02 PROCEDURE — 97110 THERAPEUTIC EXERCISES: CPT

## 2017-08-02 PROCEDURE — 25010000002 MIDAZOLAM PER 1 MG: Performed by: ANESTHESIOLOGY

## 2017-08-02 PROCEDURE — 25010000002 EPINEPHRINE PER 0.1 MG: Performed by: ORTHOPAEDIC SURGERY

## 2017-08-02 PROCEDURE — 25010000002 FENTANYL CITRATE (PF) 100 MCG/2ML SOLUTION

## 2017-08-02 PROCEDURE — 25010000002 FENTANYL CITRATE (PF) 100 MCG/2ML SOLUTION: Performed by: NURSE ANESTHETIST, CERTIFIED REGISTERED

## 2017-08-02 PROCEDURE — 25010000002 SUCCINYLCHOLINE PER 20 MG: Performed by: NURSE ANESTHETIST, CERTIFIED REGISTERED

## 2017-08-02 PROCEDURE — 27447 TOTAL KNEE ARTHROPLASTY: CPT | Performed by: ORTHOPAEDIC SURGERY

## 2017-08-02 PROCEDURE — 25010000002 VANCOMYCIN 10 G RECONSTITUTED SOLUTION: Performed by: ORTHOPAEDIC SURGERY

## 2017-08-02 PROCEDURE — 25010000002 VANCOMYCIN: Performed by: ORTHOPAEDIC SURGERY

## 2017-08-02 PROCEDURE — 25010000002 ONDANSETRON PER 1 MG: Performed by: NURSE ANESTHETIST, CERTIFIED REGISTERED

## 2017-08-02 DEVICE — P.F.C. SIGMA OVAL DOME PATELLA 3-PEG 38MM CEMENTED
Type: IMPLANTABLE DEVICE | Site: KNEE | Status: FUNCTIONAL
Brand: P.F.C. SIGMA

## 2017-08-02 DEVICE — IMPLANTABLE DEVICE: Type: IMPLANTABLE DEVICE | Site: KNEE | Status: FUNCTIONAL

## 2017-08-02 DEVICE — SIGMA TIBIAL INSERT FIXED BEARING CURVED PLUS 3 12.5MM XLK
Type: IMPLANTABLE DEVICE | Site: KNEE | Status: FUNCTIONAL
Brand: SIGMA

## 2017-08-02 DEVICE — P.F.C. SIGMA TIBIAL TRAY FIXED BEARING MODULAR COCR 3 CEMENTED
Type: IMPLANTABLE DEVICE | Site: KNEE | Status: FUNCTIONAL
Brand: P.F.C. SIGMA

## 2017-08-02 DEVICE — SIGMA FEMORAL CRUCIATE RETAINING CEMENTED 3 LEFT
Type: IMPLANTABLE DEVICE | Site: KNEE | Status: FUNCTIONAL
Brand: SIGMA

## 2017-08-02 DEVICE — SMARTSET GMV HIGH PERFORMANCE GENTAMICIN MEDIUM VISCOSITY BONE CEMENT 40G
Type: IMPLANTABLE DEVICE | Site: KNEE | Status: FUNCTIONAL
Brand: SMARTSET

## 2017-08-02 RX ORDER — METOPROLOL SUCCINATE 50 MG/1
50 TABLET, EXTENDED RELEASE ORAL EVERY MORNING
Status: DISCONTINUED | OUTPATIENT
Start: 2017-08-03 | End: 2017-08-04 | Stop reason: HOSPADM

## 2017-08-02 RX ORDER — HYDROMORPHONE HCL IN 0.9% NACL 10 MG/50ML
PATIENT CONTROLLED ANALGESIA SYRINGE INTRAVENOUS CONTINUOUS
Status: DISCONTINUED | OUTPATIENT
Start: 2017-08-02 | End: 2017-08-04 | Stop reason: HOSPADM

## 2017-08-02 RX ORDER — FENTANYL CITRATE 50 UG/ML
INJECTION, SOLUTION INTRAMUSCULAR; INTRAVENOUS
Status: COMPLETED
Start: 2017-08-02 | End: 2017-08-02

## 2017-08-02 RX ORDER — HYDROMORPHONE HCL 110MG/55ML
PATIENT CONTROLLED ANALGESIA SYRINGE INTRAVENOUS AS NEEDED
Status: DISCONTINUED | OUTPATIENT
Start: 2017-08-02 | End: 2017-08-02 | Stop reason: SURG

## 2017-08-02 RX ORDER — ONDANSETRON 4 MG/1
4 TABLET, FILM COATED ORAL EVERY 6 HOURS PRN
Status: DISCONTINUED | OUTPATIENT
Start: 2017-08-02 | End: 2017-08-04 | Stop reason: HOSPADM

## 2017-08-02 RX ORDER — OXYCODONE AND ACETAMINOPHEN 7.5; 325 MG/1; MG/1
2 TABLET ORAL
Status: DISCONTINUED | OUTPATIENT
Start: 2017-08-02 | End: 2017-08-04 | Stop reason: HOSPADM

## 2017-08-02 RX ORDER — ATORVASTATIN CALCIUM 20 MG/1
20 TABLET, FILM COATED ORAL DAILY
Status: DISCONTINUED | OUTPATIENT
Start: 2017-08-02 | End: 2017-08-04 | Stop reason: HOSPADM

## 2017-08-02 RX ORDER — PROMETHAZINE HYDROCHLORIDE 25 MG/ML
12.5 INJECTION, SOLUTION INTRAMUSCULAR; INTRAVENOUS ONCE AS NEEDED
Status: DISCONTINUED | OUTPATIENT
Start: 2017-08-02 | End: 2017-08-02 | Stop reason: HOSPADM

## 2017-08-02 RX ORDER — HYDROCHLOROTHIAZIDE 25 MG/1
12.5 TABLET ORAL EVERY MORNING
Status: DISCONTINUED | OUTPATIENT
Start: 2017-08-03 | End: 2017-08-04 | Stop reason: HOSPADM

## 2017-08-02 RX ORDER — DEXAMETHASONE SODIUM PHOSPHATE 10 MG/ML
INJECTION INTRAMUSCULAR; INTRAVENOUS AS NEEDED
Status: DISCONTINUED | OUTPATIENT
Start: 2017-08-02 | End: 2017-08-02 | Stop reason: SURG

## 2017-08-02 RX ORDER — FAMOTIDINE 10 MG/ML
20 INJECTION, SOLUTION INTRAVENOUS
Status: DISCONTINUED | OUTPATIENT
Start: 2017-08-02 | End: 2017-08-02 | Stop reason: HOSPADM

## 2017-08-02 RX ORDER — POLYETHYLENE GLYCOL 3350 17 G/17G
17 POWDER, FOR SOLUTION ORAL 2 TIMES DAILY
Status: DISCONTINUED | OUTPATIENT
Start: 2017-08-02 | End: 2017-08-04 | Stop reason: HOSPADM

## 2017-08-02 RX ORDER — SODIUM CHLORIDE, SODIUM LACTATE, POTASSIUM CHLORIDE, CALCIUM CHLORIDE 600; 310; 30; 20 MG/100ML; MG/100ML; MG/100ML; MG/100ML
INJECTION, SOLUTION INTRAVENOUS CONTINUOUS PRN
Status: DISCONTINUED | OUTPATIENT
Start: 2017-08-02 | End: 2017-08-02 | Stop reason: SURG

## 2017-08-02 RX ORDER — LIDOCAINE HYDROCHLORIDE 20 MG/ML
INJECTION, SOLUTION INFILTRATION; PERINEURAL AS NEEDED
Status: DISCONTINUED | OUTPATIENT
Start: 2017-08-02 | End: 2017-08-02 | Stop reason: SURG

## 2017-08-02 RX ORDER — MIDAZOLAM HYDROCHLORIDE 1 MG/ML
1 INJECTION INTRAMUSCULAR; INTRAVENOUS
Status: DISCONTINUED | OUTPATIENT
Start: 2017-08-02 | End: 2017-08-02 | Stop reason: HOSPADM

## 2017-08-02 RX ORDER — ONDANSETRON 2 MG/ML
4 INJECTION INTRAMUSCULAR; INTRAVENOUS ONCE AS NEEDED
Status: DISCONTINUED | OUTPATIENT
Start: 2017-08-02 | End: 2017-08-02 | Stop reason: HOSPADM

## 2017-08-02 RX ORDER — ONDANSETRON 2 MG/ML
4 INJECTION INTRAMUSCULAR; INTRAVENOUS EVERY 6 HOURS PRN
Status: DISCONTINUED | OUTPATIENT
Start: 2017-08-02 | End: 2017-08-04 | Stop reason: HOSPADM

## 2017-08-02 RX ORDER — DIPHENHYDRAMINE HYDROCHLORIDE 50 MG/ML
12.5 INJECTION INTRAMUSCULAR; INTRAVENOUS
Status: DISCONTINUED | OUTPATIENT
Start: 2017-08-02 | End: 2017-08-02 | Stop reason: HOSPADM

## 2017-08-02 RX ORDER — PROMETHAZINE HYDROCHLORIDE 25 MG/1
25 TABLET ORAL ONCE AS NEEDED
Status: DISCONTINUED | OUTPATIENT
Start: 2017-08-02 | End: 2017-08-02 | Stop reason: HOSPADM

## 2017-08-02 RX ORDER — TRANEXAMIC ACID 100 MG/ML
INJECTION, SOLUTION INTRAVENOUS AS NEEDED
Status: DISCONTINUED | OUTPATIENT
Start: 2017-08-02 | End: 2017-08-02 | Stop reason: HOSPADM

## 2017-08-02 RX ORDER — DOCUSATE SODIUM 100 MG/1
100 CAPSULE, LIQUID FILLED ORAL 2 TIMES DAILY
Status: DISCONTINUED | OUTPATIENT
Start: 2017-08-02 | End: 2017-08-04 | Stop reason: HOSPADM

## 2017-08-02 RX ORDER — OXYCODONE AND ACETAMINOPHEN 7.5; 325 MG/1; MG/1
1 TABLET ORAL ONCE AS NEEDED
Status: DISCONTINUED | OUTPATIENT
Start: 2017-08-02 | End: 2017-08-02 | Stop reason: HOSPADM

## 2017-08-02 RX ORDER — NALOXONE HCL 0.4 MG/ML
0.1 VIAL (ML) INJECTION
Status: DISCONTINUED | OUTPATIENT
Start: 2017-08-02 | End: 2017-08-04 | Stop reason: HOSPADM

## 2017-08-02 RX ORDER — FENTANYL CITRATE 50 UG/ML
50 INJECTION, SOLUTION INTRAMUSCULAR; INTRAVENOUS
Status: DISCONTINUED | OUTPATIENT
Start: 2017-08-02 | End: 2017-08-02 | Stop reason: HOSPADM

## 2017-08-02 RX ORDER — BISACODYL 10 MG
10 SUPPOSITORY, RECTAL RECTAL DAILY PRN
Status: DISCONTINUED | OUTPATIENT
Start: 2017-08-02 | End: 2017-08-04 | Stop reason: HOSPADM

## 2017-08-02 RX ORDER — ONDANSETRON 4 MG/1
4 TABLET, ORALLY DISINTEGRATING ORAL EVERY 6 HOURS PRN
Status: DISCONTINUED | OUTPATIENT
Start: 2017-08-02 | End: 2017-08-04 | Stop reason: HOSPADM

## 2017-08-02 RX ORDER — DIPHENHYDRAMINE HCL 25 MG
50 CAPSULE ORAL EVERY 6 HOURS PRN
Status: DISCONTINUED | OUTPATIENT
Start: 2017-08-02 | End: 2017-08-04 | Stop reason: HOSPADM

## 2017-08-02 RX ORDER — PROPOFOL 10 MG/ML
VIAL (ML) INTRAVENOUS AS NEEDED
Status: DISCONTINUED | OUTPATIENT
Start: 2017-08-02 | End: 2017-08-02 | Stop reason: SURG

## 2017-08-02 RX ORDER — PROMETHAZINE HYDROCHLORIDE 12.5 MG/1
12.5 TABLET ORAL EVERY 6 HOURS PRN
Status: DISCONTINUED | OUTPATIENT
Start: 2017-08-02 | End: 2017-08-04 | Stop reason: HOSPADM

## 2017-08-02 RX ORDER — SUCCINYLCHOLINE CHLORIDE 20 MG/ML
INJECTION INTRAMUSCULAR; INTRAVENOUS AS NEEDED
Status: DISCONTINUED | OUTPATIENT
Start: 2017-08-02 | End: 2017-08-02 | Stop reason: SURG

## 2017-08-02 RX ORDER — MAGNESIUM HYDROXIDE 1200 MG/15ML
LIQUID ORAL AS NEEDED
Status: DISCONTINUED | OUTPATIENT
Start: 2017-08-02 | End: 2017-08-02 | Stop reason: HOSPADM

## 2017-08-02 RX ORDER — HYDROCODONE BITARTRATE AND ACETAMINOPHEN 7.5; 325 MG/1; MG/1
1 TABLET ORAL ONCE AS NEEDED
Status: DISCONTINUED | OUTPATIENT
Start: 2017-08-02 | End: 2017-08-02 | Stop reason: HOSPADM

## 2017-08-02 RX ORDER — ONDANSETRON 2 MG/ML
INJECTION INTRAMUSCULAR; INTRAVENOUS AS NEEDED
Status: DISCONTINUED | OUTPATIENT
Start: 2017-08-02 | End: 2017-08-02 | Stop reason: SURG

## 2017-08-02 RX ORDER — HYDROMORPHONE HYDROCHLORIDE 1 MG/ML
0.5 INJECTION, SOLUTION INTRAMUSCULAR; INTRAVENOUS; SUBCUTANEOUS
Status: DISCONTINUED | OUTPATIENT
Start: 2017-08-02 | End: 2017-08-02 | Stop reason: HOSPADM

## 2017-08-02 RX ORDER — NALOXONE HCL 0.4 MG/ML
0.2 VIAL (ML) INJECTION AS NEEDED
Status: DISCONTINUED | OUTPATIENT
Start: 2017-08-02 | End: 2017-08-02 | Stop reason: HOSPADM

## 2017-08-02 RX ORDER — FLUMAZENIL 0.1 MG/ML
0.2 INJECTION INTRAVENOUS AS NEEDED
Status: DISCONTINUED | OUTPATIENT
Start: 2017-08-02 | End: 2017-08-02 | Stop reason: HOSPADM

## 2017-08-02 RX ORDER — PROMETHAZINE HYDROCHLORIDE 25 MG/1
12.5 TABLET ORAL ONCE AS NEEDED
Status: DISCONTINUED | OUTPATIENT
Start: 2017-08-02 | End: 2017-08-02 | Stop reason: HOSPADM

## 2017-08-02 RX ORDER — DIPHENHYDRAMINE HYDROCHLORIDE 50 MG/ML
25 INJECTION INTRAMUSCULAR; INTRAVENOUS EVERY 6 HOURS PRN
Status: DISCONTINUED | OUTPATIENT
Start: 2017-08-02 | End: 2017-08-04 | Stop reason: HOSPADM

## 2017-08-02 RX ORDER — HYDRALAZINE HYDROCHLORIDE 20 MG/ML
5 INJECTION INTRAMUSCULAR; INTRAVENOUS
Status: DISCONTINUED | OUTPATIENT
Start: 2017-08-02 | End: 2017-08-02 | Stop reason: HOSPADM

## 2017-08-02 RX ORDER — PROMETHAZINE HYDROCHLORIDE 25 MG/1
25 SUPPOSITORY RECTAL ONCE AS NEEDED
Status: DISCONTINUED | OUTPATIENT
Start: 2017-08-02 | End: 2017-08-02 | Stop reason: HOSPADM

## 2017-08-02 RX ORDER — SODIUM CHLORIDE 0.9 % (FLUSH) 0.9 %
1-10 SYRINGE (ML) INJECTION AS NEEDED
Status: DISCONTINUED | OUTPATIENT
Start: 2017-08-02 | End: 2017-08-02 | Stop reason: HOSPADM

## 2017-08-02 RX ORDER — MIDAZOLAM HYDROCHLORIDE 1 MG/ML
2 INJECTION INTRAMUSCULAR; INTRAVENOUS
Status: DISCONTINUED | OUTPATIENT
Start: 2017-08-02 | End: 2017-08-02 | Stop reason: HOSPADM

## 2017-08-02 RX ORDER — OXYCODONE AND ACETAMINOPHEN 7.5; 325 MG/1; MG/1
1 TABLET ORAL
Status: DISCONTINUED | OUTPATIENT
Start: 2017-08-02 | End: 2017-08-04 | Stop reason: HOSPADM

## 2017-08-02 RX ORDER — ASPIRIN 325 MG
325 TABLET, DELAYED RELEASE (ENTERIC COATED) ORAL 2 TIMES DAILY
Status: DISCONTINUED | OUTPATIENT
Start: 2017-08-02 | End: 2017-08-04 | Stop reason: HOSPADM

## 2017-08-02 RX ORDER — BISACODYL 5 MG/1
10 TABLET, DELAYED RELEASE ORAL DAILY PRN
Status: DISCONTINUED | OUTPATIENT
Start: 2017-08-02 | End: 2017-08-04 | Stop reason: HOSPADM

## 2017-08-02 RX ORDER — SODIUM CHLORIDE, SODIUM LACTATE, POTASSIUM CHLORIDE, CALCIUM CHLORIDE 600; 310; 30; 20 MG/100ML; MG/100ML; MG/100ML; MG/100ML
100 INJECTION, SOLUTION INTRAVENOUS CONTINUOUS
Status: ACTIVE | OUTPATIENT
Start: 2017-08-02 | End: 2017-08-03

## 2017-08-02 RX ORDER — ACETAMINOPHEN 325 MG/1
325 TABLET ORAL EVERY 4 HOURS PRN
Status: DISCONTINUED | OUTPATIENT
Start: 2017-08-02 | End: 2017-08-04 | Stop reason: HOSPADM

## 2017-08-02 RX ORDER — FENTANYL CITRATE 50 UG/ML
INJECTION, SOLUTION INTRAMUSCULAR; INTRAVENOUS AS NEEDED
Status: DISCONTINUED | OUTPATIENT
Start: 2017-08-02 | End: 2017-08-02 | Stop reason: SURG

## 2017-08-02 RX ORDER — LABETALOL HYDROCHLORIDE 5 MG/ML
5 INJECTION, SOLUTION INTRAVENOUS
Status: DISCONTINUED | OUTPATIENT
Start: 2017-08-02 | End: 2017-08-02 | Stop reason: HOSPADM

## 2017-08-02 RX ORDER — EPHEDRINE SULFATE 50 MG/ML
5 INJECTION, SOLUTION INTRAVENOUS ONCE AS NEEDED
Status: DISCONTINUED | OUTPATIENT
Start: 2017-08-02 | End: 2017-08-02 | Stop reason: HOSPADM

## 2017-08-02 RX ADMIN — SODIUM CHLORIDE, POTASSIUM CHLORIDE, SODIUM LACTATE AND CALCIUM CHLORIDE: 600; 310; 30; 20 INJECTION, SOLUTION INTRAVENOUS at 09:18

## 2017-08-02 RX ADMIN — FENTANYL CITRATE 50 MCG: 50 INJECTION INTRAMUSCULAR; INTRAVENOUS at 11:20

## 2017-08-02 RX ADMIN — HYDROMORPHONE HYDROCHLORIDE 0.5 MG: 1 INJECTION, SOLUTION INTRAMUSCULAR; INTRAVENOUS; SUBCUTANEOUS at 11:50

## 2017-08-02 RX ADMIN — VANCOMYCIN HYDROCHLORIDE 1500 MG: 1 INJECTION, POWDER, LYOPHILIZED, FOR SOLUTION INTRAVENOUS at 08:50

## 2017-08-02 RX ADMIN — HYDROMORPHONE HYDROCHLORIDE 0.4 MG: 2 INJECTION, SOLUTION INTRAMUSCULAR; INTRAVENOUS; SUBCUTANEOUS at 10:08

## 2017-08-02 RX ADMIN — PHENYLEPHRINE HYDROCHLORIDE 200 MCG: 10 INJECTION INTRAVENOUS at 09:43

## 2017-08-02 RX ADMIN — LIDOCAINE HYDROCHLORIDE 60 MG: 20 INJECTION, SOLUTION INFILTRATION; PERINEURAL at 09:30

## 2017-08-02 RX ADMIN — ASPIRIN 325 MG: 325 TABLET, DELAYED RELEASE ORAL at 17:34

## 2017-08-02 RX ADMIN — SODIUM CHLORIDE, POTASSIUM CHLORIDE, SODIUM LACTATE AND CALCIUM CHLORIDE 100 ML/HR: 600; 310; 30; 20 INJECTION, SOLUTION INTRAVENOUS at 20:52

## 2017-08-02 RX ADMIN — OXYCODONE HYDROCHLORIDE AND ACETAMINOPHEN 2 TABLET: 7.5; 325 TABLET ORAL at 22:04

## 2017-08-02 RX ADMIN — DEXAMETHASONE SODIUM PHOSPHATE 8 MG: 10 INJECTION INTRAMUSCULAR; INTRAVENOUS at 10:08

## 2017-08-02 RX ADMIN — FENTANYL CITRATE 50 MCG: 50 INJECTION, SOLUTION INTRAMUSCULAR; INTRAVENOUS at 11:20

## 2017-08-02 RX ADMIN — FENTANYL CITRATE 100 MCG: 50 INJECTION INTRAMUSCULAR; INTRAVENOUS at 09:25

## 2017-08-02 RX ADMIN — HYDROMORPHONE HYDROCHLORIDE 0.4 MG: 2 INJECTION, SOLUTION INTRAMUSCULAR; INTRAVENOUS; SUBCUTANEOUS at 10:02

## 2017-08-02 RX ADMIN — ONDANSETRON 4 MG: 2 INJECTION INTRAMUSCULAR; INTRAVENOUS at 10:42

## 2017-08-02 RX ADMIN — PROPOFOL 110 MG: 10 INJECTION, EMULSION INTRAVENOUS at 09:30

## 2017-08-02 RX ADMIN — ATORVASTATIN CALCIUM 20 MG: 20 TABLET, FILM COATED ORAL at 20:52

## 2017-08-02 RX ADMIN — FAMOTIDINE 20 MG: 10 INJECTION, SOLUTION INTRAVENOUS at 08:34

## 2017-08-02 RX ADMIN — SODIUM CHLORIDE, POTASSIUM CHLORIDE, SODIUM LACTATE AND CALCIUM CHLORIDE 100 ML/HR: 600; 310; 30; 20 INJECTION, SOLUTION INTRAVENOUS at 11:47

## 2017-08-02 RX ADMIN — Medication: at 11:43

## 2017-08-02 RX ADMIN — SUCCINYLCHOLINE CHLORIDE 160 MG: 20 INJECTION, SOLUTION INTRAMUSCULAR; INTRAVENOUS; PARENTERAL at 09:30

## 2017-08-02 RX ADMIN — OXYCODONE HYDROCHLORIDE AND ACETAMINOPHEN 2 TABLET: 7.5; 325 TABLET ORAL at 17:34

## 2017-08-02 RX ADMIN — VANCOMYCIN HYDROCHLORIDE 1500 MG: 10 INJECTION, POWDER, LYOPHILIZED, FOR SOLUTION INTRAVENOUS at 20:51

## 2017-08-02 RX ADMIN — MIDAZOLAM 1 MG: 1 INJECTION INTRAMUSCULAR; INTRAVENOUS at 08:34

## 2017-08-02 NOTE — PLAN OF CARE
Problem: Perioperative Period (Adult)  Goal: Signs and Symptoms of Listed Potential Problems Will be Absent or Manageable (Perioperative Period)  Outcome: Ongoing (interventions implemented as appropriate)    08/02/17 0801   Perioperative Period   Problems Assessed (Perioperative Period) pain   Problems Present (Perioperative Period) pain

## 2017-08-02 NOTE — H&P (VIEW-ONLY)
History & Physical       Patient: Loulou Collier  YOB: 1940  Medical Record Number: [id number]  Body mass index is 37.49 kg/(m^2).    Surgeon:  Dr. Rui Craft    Chief Complaints:   Chief Complaint   Patient presents with   • Left Knee - Follow-up       Subjective:    History of Present Illness: 77 y.o. female presents with   Chief Complaint   Patient presents with   • Left Knee - Follow-up   . Onset of symptoms was years ago and has been progressively worsening despite more conservative treatment measures.  Symptoms are associated with ability to move, exercise, and perform activities of daily living.  Symptoms are aggravated by weight bearing and ROM necessary for activities of daily living.   Symptoms improve with rest, ice and elevation only minimally.      Allergies:   Allergies   Allergen Reactions   • Cephalosporins Hives   • Penicillins Hives       Medications:   Home Medications:  Current Outpatient Prescriptions on File Prior to Visit   Medication Sig   • acetaminophen (TYLENOL) 500 MG tablet Take 500 mg by mouth every 6 (six) hours as needed.   • Chlorhexidine Gluconate 2 % pads Apply 1 application topically 3 (Three) Times a Day. PRE OP   • Glucosamine-Chondroit-Vit C-Mn (GLUCOSAMINE CHONDR 500 COMPLEX) capsule Take 1 tablet by mouth Every Morning.   • hydrochlorothiazide (HYDRODIURIL) 12.5 MG tablet Take 12.5 mg by mouth Every Morning.   • meloxicam (MOBIC) 15 MG tablet Take 15 mg by mouth Every Morning.   • metoprolol succinate XL (TOPROL-XL) 50 MG 24 hr tablet Take 50 mg by mouth Every Morning.   • mupirocin (BACTROBAN) 2 % ointment Apply 1 application topically 3 (Three) Times a Day. PRE OP   • simvastatin (ZOCOR) 40 MG tablet Take 40 mg by mouth Every Night.     No current facility-administered medications on file prior to visit.      Current Medications:  Scheduled Meds:  Continuous Infusions:  No current facility-administered medications for this visit.   PRN Meds:.    I  have reviewed the patient's medical history in detail and updated the computerized patient record.  Review and summarization of old records include:    Past Medical History:   Diagnosis Date   • Arthritis    • High cholesterol    • Hypertension         Past Surgical History:   Procedure Laterality Date   • BREAST BIOPSY     • HYSTERECTOMY     • JOINT REPLACEMENT      RT KNEE   • KNEE SURGERY          Social History     Occupational History   • Not on file.     Social History Main Topics   • Smoking status: Never Smoker   • Smokeless tobacco: Never Used   • Alcohol use No   • Drug use: No   • Sexual activity: Not on file    Social History     Social History Narrative        Family History   Problem Relation Age of Onset   • Hypertension Other    • Cancer Other    • Diabetes Other    • No Known Problems Mother    • No Known Problems Father    • No Known Problems Sister    • No Known Problems Brother    • No Known Problems Daughter    • No Known Problems Son    • No Known Problems Maternal Grandmother    • No Known Problems Paternal Grandmother    • No Known Problems Maternal Aunt    • No Known Problems Paternal Aunt    • BRCA 1/2 Neg Hx    • Breast cancer Neg Hx    • Colon cancer Neg Hx    • Endometrial cancer Neg Hx    • Ovarian cancer Neg Hx    • Malig Hyperthermia Neg Hx        ROS: 14 point review of systems was performed and was negative except for documented findings in HPI and today's encounter.     Allergies:   Allergies   Allergen Reactions   • Cephalosporins Hives   • Penicillins Hives     Constitutional:  Denies fever, shaking or chills   Eyes:  Denies change in visual acuity   HENT:  Denies nasal congestion or sore throat   Respiratory:  Denies cough or shortness of breath   Cardiovascular:  Denies chest pain or severe LE edema   GI:  Denies abdominal pain, nausea, vomiting, bloody stools or diarrhea   Musculoskeletal:  Denies numbness tingling or loss of motor function except as outlined above in history  of present illness.  : Denies painful urination or hematuria  Integument:  Denies rash, lesion or ulceration   Neurologic:  Denies headache or focal weakness  Endocrine:  Denies lymphadenopathy  Psych:  Denies confusion or change in mental status   Hem:  Denies active bleeding    Physical Exam: 77 y.o. female  Body mass index is 37.49 kg/(m^2)., @HT@, @WT@  Vitals:    07/17/17 0901   Temp: 97.6 °F (36.4 °C)     Vital signs reviewed.   General Appearance:    Alert, cooperative, in no acute distress                  Eyes: conjunctiva clear  ENT: external ears and nose atraumatic  CV: no peripheral edema  Resp: normal respiratory effort  Skin: no rashes or wounds; normal turgor  Psych: mood and affect appropriate  Lymph: no nodes appreciated  Neuro: gross sensation intact  Vascular:  Palpable peripheral pulse in noted extremity  Musculoskeletal Extremities: DETAILED KNEE Exam: Left knee: Painful gait w/wo limp, muscle atrophy, erythema, ecchymosis, or gross deformity noted, Large knee effusion, + medial and lateral joint line tenderness, Active range of motion normal, 5/5 strength flexion and extension, The knee is stable to varus and valgus stress testing, VARUS VALGUS NEUTRAL: varus alignment of the limb, Lachman negative, Posterior drawer negative, Juliette's negative, Patellofemoral grind +, Sensation grossly intact to light tough throughout the lower extremity, Skin is intact, Distal pulses are palpable, No signs or symptoms of DVT          Diagnostic Tests:  Appointment on 07/14/2017   Component Date Value Ref Range Status   • Glucose 07/14/2017 95  65 - 99 mg/dL Final   • BUN 07/14/2017 23  8 - 23 mg/dL Final   • Creatinine 07/14/2017 1.10* 0.57 - 1.00 mg/dL Final   • Sodium 07/14/2017 143  136 - 145 mmol/L Final   • Potassium 07/14/2017 3.8  3.5 - 5.2 mmol/L Final   • Chloride 07/14/2017 103  98 - 107 mmol/L Final   • CO2 07/14/2017 27.0  22.0 - 29.0 mmol/L Final   • Calcium 07/14/2017 9.2  8.6 - 10.5 mg/dL  Final   • eGFR   Amer 07/14/2017 59* >60 mL/min/1.73 Final   • BUN/Creatinine Ratio 07/14/2017 20.9  7.0 - 25.0 Final   • Anion Gap 07/14/2017 13.0  mmol/L Final   • WBC 07/14/2017 5.53  4.50 - 10.70 10*3/mm3 Final   • RBC 07/14/2017 4.61  3.90 - 5.20 10*6/mm3 Final   • Hemoglobin 07/14/2017 12.6  11.9 - 15.5 g/dL Final   • Hematocrit 07/14/2017 39.6  35.6 - 45.5 % Final   • MCV 07/14/2017 85.9  80.5 - 98.2 fL Final   • MCH 07/14/2017 27.3  26.9 - 32.0 pg Final   • MCHC 07/14/2017 31.8* 32.4 - 36.3 g/dL Final   • RDW 07/14/2017 16.4* 11.7 - 13.0 % Final   • RDW-SD 07/14/2017 51.4  37.0 - 54.0 fl Final   • MPV 07/14/2017 10.8  6.0 - 12.0 fL Final   • Platelets 07/14/2017 208  140 - 500 10*3/mm3 Final   • Color, UA 07/14/2017 Yellow  Yellow, Straw Final   • Appearance, UA 07/14/2017 Clear  Clear Final   • pH, UA 07/14/2017 5.5  5.0 - 8.0 Final   • Specific Gravity, UA 07/14/2017 1.018  1.005 - 1.030 Final   • Glucose, UA 07/14/2017 Negative  Negative Final   • Ketones, UA 07/14/2017 Negative  Negative Final   • Bilirubin, UA 07/14/2017 Negative  Negative Final   • Blood, UA 07/14/2017 Negative  Negative Final   • Protein, UA 07/14/2017 Negative  Negative Final   • Leuk Esterase, UA 07/14/2017 Moderate (2+)* Negative Final   • Nitrite, UA 07/14/2017 Negative  Negative Final   • Urobilinogen, UA 07/14/2017 0.2 E.U./dL  0.2 - 1.0 E.U./dL Final   • RBC, UA 07/14/2017 0-2  None Seen, 0-2 /HPF Final   • WBC, UA 07/14/2017 6-12* None Seen, 0-2 /HPF Final   • Bacteria, UA 07/14/2017 None Seen  None Seen /HPF Final   • Squamous Epithelial Cells, UA 07/14/2017 3-6* None Seen, 0-2 /HPF Final   • Hyaline Casts, UA 07/14/2017 None Seen  None Seen /LPF Final   • Methodology 07/14/2017 Automated Microscopy   Final   • Urine Culture 07/14/2017 25,000 CFU/mL Mixed Culture   Final     No results found.    Imaging was done previously in the office and discussed at length with the patient:    Indication: pain related  symptoms,  Views: 3V AP, LAT & 40 degree PA left knee(s)   Findings: severe end-stage arthritis (bone on bone, subchondral sclerosis/cysts, osteophytes), S/P right  Total Knee Replacement in good position and alignment  Comparison views: viewed last xray done in the office.     Assessment: endstage arthritis of left knee  Patient Active Problem List   Diagnosis   • Elevated cholesterol   • Benign essential HTN   • H/O hysterectomy for benign disease   • Status post total knee replacement, right   • Primary osteoarthritis of knee       Plan:  Dr. Rui Craft reviewed anatomy of a total joint arthroplasty in laymen's terms, as well as typical postoperative recovery and possibly 6-12 months for maximal recovery, and possible need for rehabilitation stay after hospitalization. We also discussed risks, benefits, alternatives, and limitations of procedure with risks including but not limited to neurovascular damage, bleeding, infection, malalignment, chronic pian, failure of implants, osteolysis, loosening of implants, loss of motion, weakness, stiffness, instability, DVT, pulmonary embolus, death, stroke, complex regional pain syndrome, myocardial infarction, and need for additional procedures. Concept of substitution vs. replacement discussed.  No guarantees were given regarding results of surgery.      Loulou Collier was given the opportunity to ask and have all questions answered today.  The patient voiced understanding of the risks, benefits, and alternative forms of treatment that were discussed and the patient consents to proceed with surgery.     Patient's blood clot history is negative.  Planned DVT prophylaxis for surgery:  Aspirin    Discharge Plan: POD 2-3 to home and home health, was cleared by Dr. Gurpreet Valenzuela    Patient was seen by DARON Villafana in the office today.    Date: 7/17/2017  DARON Lester

## 2017-08-02 NOTE — PLAN OF CARE
Problem: Perioperative Period (Adult)  Goal: Signs and Symptoms of Listed Potential Problems Will be Absent or Manageable (Perioperative Period)  Outcome: Ongoing (interventions implemented as appropriate)    08/02/17 1158   Perioperative Period   Problems Assessed (Perioperative Period) pain;hemorrhage;hypothermia;hypoxia/hypoxemia;situational response   Problems Present (Perioperative Period) pain

## 2017-08-02 NOTE — THERAPY EVALUATION
Acute Care - Physical Therapy Initial Evaluation  [facility]     Patient Name: Loulou Collier  : 1940  MRN: [id number]  Today's Date: 2017   Onset of Illness/Injury or Date of Surgery Date: 17  Date of Referral to PT: 17  Referring Physician: Rui Diaz      Admit Date: 2017     Visit Dx:    ICD-10-CM ICD-9-CM   1. Arthritis of left knee M19.90 716.96     Patient Active Problem List   Diagnosis   • Elevated cholesterol   • Benign essential HTN   • H/O hysterectomy for benign disease   • Status post total knee replacement, right   • Primary osteoarthritis of knee   • Arthritis of left knee   • Chronic pain of left knee     Past Medical History:   Diagnosis Date   • Arthritis    • High cholesterol    • Hypertension      Past Surgical History:   Procedure Laterality Date   • BREAST BIOPSY     • HYSTERECTOMY     • JOINT REPLACEMENT      RT KNEE   • KNEE SURGERY            PT ASSESSMENT (last 72 hours)      PT Evaluation       17 1432 17 0753    Rehab Evaluation    Document Type evaluation  -MS     Subjective Information agree to therapy;complains of;fatigue;pain  -MS     Patient Effort, Rehab Treatment adequate  -MS     Symptoms Noted Comment Pt. very groggy this PM, having difficulty keeping her eyes open during therapy session.  -MS     General Information    Onset of Illness/Injury or Date of Surgery Date 17  -MS     Referring Physician Rui Diaz  -MS     Pertinent History Of Current Problem Pt. s/p Left TKR  -MS     Precautions/Limitations fall precautions  -MS     Prior Level of Function independent:  -MS     Equipment Currently Used at Home none  -MS none  -SZ    Plans/Goals Discussed With patient;agreed upon  -MS     Risks Reviewed patient:  -MS     Benefits Reviewed patient:  -MS     Barriers to Rehab --   Pt. groggy this PM.  -MS     Living Environment    Lives With  child(paolo), adult  -SZ    Living Arrangements  house  -SZ    Home Accessibility   no concerns  -SZ    Clinical Impression    Date of Referral to PT 08/02/17  -MS     Criteria for Skilled Therapeutic Interventions Met treatment indicated  -MS     Rehab Potential good, to achieve stated therapy goals  -MS     Pain Assessment    Pain Assessment 0-10  -MS     Pain Score 3  -MS     Post Pain Score 3  -MS     Pain Type Acute pain;Surgical pain  -MS     Pain Location Knee  -MS     Pain Orientation Left  -MS     Cognitive Assessment/Intervention    Current Cognitive/Communication Assessment functional  -MS     Orientation Status oriented to;person;place  -MS     Follows Commands/Answers Questions 100% of the time;able to follow single-step instructions;needs cueing;needs increased time;needs repetition  -MS     Personal Safety Interventions fall prevention program maintained;gait belt;nonskid shoes/slippers when out of bed;supervised activity  -MS     ROM (Range of Motion)    General ROM --   BUE/RLE (WFL's)  -MS     MMT (Manual Muscle Testing)    General MMT Assessment --   BUE/RLE (>/= 3/5)  -MS     Mobility Assessment/Training    Extremity Weight-Bearing Status left lower extremity  -MS     Left Lower Extremity Weight-Bearing weight-bearing as tolerated  -MS     Bed Mobility, Assessment/Treatment    Bed Mob, Supine to Sit, [place] minimum assist (75% patient effort)  -MS     Transfer Assessment/Treatment    Transfers, Sit-Stand [place] minimum assist (75% patient effort);2 person assist required  -MS     Transfers, Stand-Sit [place] minimum assist (75% patient effort);2 person assist required  -MS     Transfers, Sit-Stand-Sit, Assist Device rolling walker  -MS     Gait Assessment/Treatment    Gait, [place] Level minimum assist (75% patient effort);2 person assist required  -MS     Gait, Assistive Device rolling walker  -MS     Gait, Distance (Feet) 5  -MS     Gait, Gait Pattern Analysis swing-to gait  -MS     Gait, Gait Deviations left:;antalgic;nikita decreased;forward flexed  posture;step length decreased  -MS     Gait, Safety Issues balance decreased during turns;step length decreased;supplemental O2  -MS     Gait, Comment Limited by fatigue; Pt. could not keep her eyes open during therapy session.  -MS     Therapy Exercises    Exercise Protocols total knee  -MS     Total Knee Exercises left:;10 reps;completed protocol  -MS     Positioning and Restraints    Pre-Treatment Position in bed  -MS     Post Treatment Position chair  -MS     In Chair notified nsg;reclined;sitting;call light within reach;encouraged to call for assist;with family/caregiver   All lines intact. Ice pack to Left knee.  -MS       User Key  (r) = Recorded By, (t) = Taken By, (c) = Cosigned By    Initials Name Provider Type    SZ Marianne Walter, RN Registered Nurse    MS Tolu Poon, PT Physical Therapist          Physical Therapy Education     Title: PT OT SLP Therapies (Done)     Topic: Physical Therapy (Done)     Point: Mobility training (Done)    Learning Progress Summary    Learner Readiness Method Response Comment Documented by Status   Patient Acceptance KRYSTYNA MUNOZ NR  MS 08/02/17 1437 Done               Point: Home exercise program (Done)    Learning Progress Summary    Learner Readiness Method Response Comment Documented by Status   Patient Acceptance KRYSTYNA MUNOZ NR  MS 08/02/17 1437 Done               Point: Body mechanics (Done)    Learning Progress Summary    Learner Readiness Method Response Comment Documented by Status   Patient Acceptance KRYSTYNA MUNOZ NR  MS 08/02/17 1437 Done               Point: Precautions (Done)    Learning Progress Summary    Learner Readiness Method Response Comment Documented by Status   Patient Acceptance KRYSTYNA MUNOZ NR  MS 08/02/17 1437 Done                      User Key     Initials Effective Dates Name Provider Type Discipline    MS 12/01/15 -  Tolu Poon, YAN Physical Therapist PT                PT Recommendation and Plan  Anticipated Discharge Disposition: skilled nursing  facility  Planned Therapy Interventions: balance training, bed mobility training, gait training, home exercise program, patient/family education, postural re-education, strengthening, transfer training, ROM (Range of Motion)  PT Frequency: 2 times/day  Plan of Care Review  Plan Of Care Reviewed With: patient  Outcome Summary/Follow up Plan: Pt. will benefit from skilled inpt. P.T. to address her functional deficits and to assist pt. in regaining her independence with functional mobility.          IP PT Goals       08/02/17 1438          Bed Mobility PT LTG    Bed Mobility PT LTG, Date Established 08/02/17  -MS      Bed Mobility PT LTG, Time to Achieve 3 days  -MS      Bed Mobility PT LTG, Activity Type all bed mobility  -MS      Bed Mobility PT LTG, [place] Level supervision required  -MS      Transfer Training PT LTG    Transfer Training PT LTG, Date Established 08/02/17  -MS      Transfer Training PT LTG, Time to Achieve 3 days  -MS      Transfer Training PT LTG, Activity Type all transfers  -MS      Transfer Training PT LTG, [place] Level supervision required  -MS      Transfer Training PT LTG, Assist Device walker, rolling  -MS      Gait Training PT LTG    Gait Training Goal PT LTG, Date Established 08/02/17  -MS      Gait Training Goal PT LTG, Time to Achieve 3 days  -MS      Gait Training Goal PT LTG, [place] Level supervision required  -MS      Gait Training Goal PT LTG, Assist Device walker, rolling  -MS      Gait Training Goal PT LTG, Distance to Achieve 100 feet  -MS      Range of Motion PT LTG    Range of Motion Goal PT LTG, Date Established 08/02/17  -MS      Range of Motion Goal PT LTG, Time to Achieve 3 days  -MS      Range fo Motion Goal PT LTG, Joint L knee  -MS      Range of Motion Goal PT LTG, AROM Measure (5, 90)  -MS        User Key  (r) = Recorded By, (t) = Taken By, (c) = Cosigned By    Initials Name Provider Type    MS Tolu Poon, PT Physical Therapist                 Outcome Measures       08/02/17 1400          How much help from another person do you currently need...    Turning from your back to your side while in flat bed without using bedrails? 3  -MS      Moving from lying on back to sitting on the side of a flat bed without bedrails? 2  -MS      Moving to and from a bed to a chair (including a wheelchair)? 3  -MS      Standing up from a chair using your arms (e.g., wheelchair, bedside chair)? 3  -MS      Climbing 3-5 steps with a railing? 2  -MS      To walk in hospital room? 3  -MS      AM-PAC 6 Clicks Score 16  -MS      Functional Assessment    Outcome Measure Options AM-PAC 6 Clicks Basic Mobility (PT)  -MS        User Key  (r) = Recorded By, (t) = Taken By, (c) = Cosigned By    Initials Name Provider Type    MS Tolu Poon PT Physical Therapist           Time Calculation:         PT Charges       08/02/17 1439          Time Calculation    Start Time 1419  -MS      Stop Time 1434  -MS      Time Calculation (min) 15 min  -MS      PT Received On 08/02/17  -MS      PT - Next Appointment 08/03/17  -MS      PT Goal Re-Cert Due Date 08/05/17  -MS        User Key  (r) = Recorded By, (t) = Taken By, (c) = Cosigned By    Initials Name Provider Type    MS Tolu Poon PT Physical Therapist          Therapy Charges for Today     Code Description Service Date Service Provider Modifiers Qty    49506159170 HC PT EVAL LOW COMPLEXITY 1 8/2/2017 Tolu Poon, PT GP 1    44531251433 HC PT THER PROC EA 15 MIN 8/2/2017 Tolu Poon, PT GP 1    65969941093 HC PT THER SUPP EA 15 MIN 8/2/2017 Tolu Poon, PT GP 1          PT G-Codes  Outcome Measure Options: AM-PAC 6 Clicks Basic Mobility (PT)      Tolu Poon PT  8/2/2017

## 2017-08-02 NOTE — PLAN OF CARE
Problem: Patient Care Overview (Adult)  Goal: Plan of Care Review    08/02/17 1438   Coping/Psychosocial Response Interventions   Plan Of Care Reviewed With patient   Outcome Evaluation   Outcome Summary/Follow up Plan Pt. will benefit from skilled inpt. P.T. to address her functional deficits and to assist pt. in regaining her independence with functional mobility.         Problem: Inpatient Physical Therapy  Goal: Bed Mobility Goal LTG- PT    08/02/17 1438   Bed Mobility PT LTG   Bed Mobility PT LTG, Date Established 08/02/17   Bed Mobility PT LTG, Time to Achieve 3 days   Bed Mobility PT LTG, Activity Type all bed mobility   Bed Mobility PT LTG, [place] Level supervision required       Goal: Transfer Training Goal 1 LTG- PT    08/02/17 1438   Transfer Training PT LTG   Transfer Training PT LTG, Date Established 08/02/17   Transfer Training PT LTG, Time to Achieve 3 days   Transfer Training PT LTG, Activity Type all transfers   Transfer Training PT LTG, [place] Level supervision required   Transfer Training PT LTG, Assist Device walker, rolling       Goal: Gait Training Goal LTG- PT    08/02/17 1438   Gait Training PT LTG   Gait Training Goal PT LTG, Date Established 08/02/17   Gait Training Goal PT LTG, Time to Achieve 3 days   Gait Training Goal PT LTG, [place] Level supervision required   Gait Training Goal PT LTG, Assist Device walker, rolling   Gait Training Goal PT LTG, Distance to Achieve 100 feet       Goal: Range of Motion Goal LTG- PT    08/02/17 1438   Range of Motion PT LTG   Range of Motion Goal PT LTG, Date Established 08/02/17   Range of Motion Goal PT LTG, Time to Achieve 3 days   Range fo Motion Goal PT LTG, Joint L knee   Range of Motion Goal PT LTG, AROM Measure (5, 90)

## 2017-08-02 NOTE — ANESTHESIA POSTPROCEDURE EVALUATION
Patient: Loulou Collier    Procedure Summary     Date Anesthesia Start Anesthesia Stop Room / Location    08/02/17 0918 1114 [facility] / [facility]       Procedure Diagnosis Surgeon Provider    TOTAL KNEE ARTHROPLASTY WITH NITIN NAVIGATION (Left Knee) Arthritis of left knee  (Arthritis of left knee [M19.90]) MD Sami Jimenez MD          Anesthesia Type: general  Last vitals  BP   136/80 (08/02/17 1150)    Temp   36.4 °C (97.5 °F) (08/02/17 1111)    Pulse   72 (08/02/17 1150)   Resp   14 (08/02/17 1150)    SpO2   100 % (08/02/17 1150)      Post Anesthesia Care and Evaluation    Patient location during evaluation: PACU  Anesthetic complications: No anesthetic complications

## 2017-08-02 NOTE — ANESTHESIA PROCEDURE NOTES
Airway  Urgency: elective    Date/Time: 8/2/2017 9:31 AM  Airway not difficult    General Information and Staff    Patient location during procedure: OR  Anesthesiologist: LEEANN TRONCOSO  CRNA: YULI LOWERY    Indications and Patient Condition  Indications for airway management: airway protection    Preoxygenated: yes      Final Airway Details  Final airway type: endotracheal airway      Successful airway: ETT  Cuffed: yes   Successful intubation technique: direct laryngoscopy  Endotracheal tube insertion site: oral  Blade: Griffiths  Blade size: #2  ETT size: 7.0 mm  Cormack-Lehane Classification: grade I - full view of glottis  Placement verified by: chest auscultation and capnometry   Cuff volume (mL): 6  Measured from: lips  ETT to lips (cm): 20  Number of attempts at approach: 1    Additional Comments  EBBS: ETCO2+: Atraumatic intubation: Lips and teeth same as pre op

## 2017-08-02 NOTE — PLAN OF CARE
Problem: Patient Care Overview (Adult)  Goal: Plan of Care Review  Outcome: Ongoing (interventions implemented as appropriate)    08/02/17 1103   Coping/Psychosocial Response Interventions   Plan Of Care Reviewed With patient   Patient Care Overview   Progress progress toward functional goals as expected   Outcome Evaluation   Outcome Summary/Follow up Plan PATIENT DID VERY WELL WITH PT TODAY POST OP. AMBULATED WITH ASSIST OF WALKER AND ONE STAFF. PATIENT EDUCATED RTEGARDING HTN. B/P WELL CONTROLLED AT THIS TIME BY MEDS.       Goal: Adult Individualization and Mutuality  Outcome: Ongoing (interventions implemented as appropriate)  Goal: Discharge Needs Assessment  Outcome: Ongoing (interventions implemented as appropriate)    Problem: Perioperative Period (Adult)  Goal: Signs and Symptoms of Listed Potential Problems Will be Absent or Manageable (Perioperative Period)  Outcome: Ongoing (interventions implemented as appropriate)    Problem: Knee Replacement, Total (Adult)  Goal: Signs and Symptoms of Listed Potential Problems Will be Absent or Manageable (Knee Replacement, Total)  Outcome: Ongoing (interventions implemented as appropriate)    Problem: Fall Risk (Adult)  Goal: Absence of Falls  Outcome: Ongoing (interventions implemented as appropriate)

## 2017-08-02 NOTE — OP NOTE
Operative Note    Name: Loulou Collier  YOB: 1940  MRN: [id number]  BMI: Body mass index is 36.48 kg/(m^2).    DATE OF SURGERY: 8/2/2017    PREOPERATIVE DIAGNOSIS: left knee end-stage osteoarthritis, increased BMI 36    POSTOPERATIVE DIAGNOSIS: left knee end-stage osteoarthritis    PROCEDURE PERFORMED: left total knee replacement with Edon navigation, increased BMI 36     SURGEON: Dr. Rui Craft    ASSISTANT: DARON Villafana    IMPLANTS: Depuy PFC size 3L femoral component, size 3 tibial baseplate, 12.5mm polyethylene insert, 38 mm patellar button    Estimated Blood Loss: 100cc  Specimens : none  Complications: none    DESCRIPTION OF PROCEDURE: The patient was taken to the operating room and placed in the supine position. Preoperative antibiotics were administered. Surgical time out was performed. After adequate induction of anesthesia, the leg was prepped and draped in the usual sterile fashion.  Surgical time out was performed. The leg was exsanguinated with an Esmarch bandage and the tourniquet inflated to 250 mmHg. A midline incision was performed followed by a medial parapatellar arthrotomy. The patella was subluxed laterally.  A portion of the fat pad, ACL, and anterior horns of the meniscus were excised.  The [employer] navigation device was affixed and navigated. The distal cut was made. The femur was then sized with a sizing guide. The femoral cutting block was placed and all femoral cuts were performed. The proximal tibia was exposed. Ariana navigation protocol was accomplished. There was a flexion contracture of 10 degrees and 11 millimeters were removed.  We used the extramedullary tibial cutting guide set for removal of 3mm of bone off the low side. The tibial cut was performed. The posterior horns of the menisci were excised. The posterior osteophytes were removed. Flexion extension blocks were then used to balance the knee. The tibial cut surface was then sized with the sizing  templates and the tibial and femoral trial were then placed. The tray was aligned with the middle third of the tubercle.    Attention was then placed to the patella. The patella was balanced to track centrally through range of motion.  It measured 23 and patella resurfacing was performed.   At this point all trial components were removed, the knee was copiously irrigated with pulsed lavage, and the knee was injected with anesthetic cocktail solution. The cut surfaces were then dried with clean lap sponges, and the components were cemented tibia, followed by femura. The knee was held in full extension and all excess cement was removed. The knee was held still until the cement had completely hardened. We then placed the trial polyethylene spacer which resulted in full extension and excellent flexion-extension balance. We placed the final polyethylene spacer.   The knee was then copiously irrigated with the full 3 liters. The tourniquet was then released. There was excellent hemostasis. We closed the knee in multiple layers in standard fashion. Sterile dressing were applied. At the end of the case, the sponge and needle counts were reported as being correct. There were no known complications. The patient was then transported to the recovery room.    Rui Craft M.D.

## 2017-08-02 NOTE — PLAN OF CARE
Problem: Patient Care Overview (Adult)  Goal: Plan of Care Review  Outcome: Ongoing (interventions implemented as appropriate)    08/02/17 0800   Coping/Psychosocial Response Interventions   Plan Of Care Reviewed With patient   Patient Care Overview   Progress no change       Goal: Adult Individualization and Mutuality  Outcome: Ongoing (interventions implemented as appropriate)    08/02/17 0800   Mutuality/Individual Preferences   What Questions Do You Have About Your Health or Care? none

## 2017-08-02 NOTE — PLAN OF CARE
Problem: Patient Care Overview (Adult)  Goal: Plan of Care Review  Outcome: Ongoing (interventions implemented as appropriate)    08/02/17 1159   Coping/Psychosocial Response Interventions   Plan Of Care Reviewed With patient   Patient Care Overview   Progress improving   Outcome Evaluation   Outcome Summary/Follow up Plan VSS. Pain controlled with PCA. Palpable pulses. NV WNL. 2 L NC. Ok to transfer to floor.

## 2017-08-03 LAB
ANION GAP SERPL CALCULATED.3IONS-SCNC: 12.1 MMOL/L
BUN BLD-MCNC: 14 MG/DL (ref 8–23)
BUN/CREAT SERPL: 17.7 (ref 7–25)
CALCIUM SPEC-SCNC: 8.4 MG/DL (ref 8.6–10.5)
CHLORIDE SERPL-SCNC: 105 MMOL/L (ref 98–107)
CO2 SERPL-SCNC: 25.9 MMOL/L (ref 22–29)
CREAT BLD-MCNC: 0.79 MG/DL (ref 0.57–1)
GFR SERPL CREATININE-BSD FRML MDRD: 86 ML/MIN/1.73
GLUCOSE BLD-MCNC: 125 MG/DL (ref 65–99)
HCT VFR BLD AUTO: 36.3 % (ref 35.6–45.5)
HGB BLD-MCNC: 11.5 G/DL (ref 11.9–15.5)
POTASSIUM BLD-SCNC: 4.5 MMOL/L (ref 3.5–5.2)
SODIUM BLD-SCNC: 143 MMOL/L (ref 136–145)

## 2017-08-03 PROCEDURE — 85018 HEMOGLOBIN: CPT | Performed by: ORTHOPAEDIC SURGERY

## 2017-08-03 PROCEDURE — 85014 HEMATOCRIT: CPT | Performed by: ORTHOPAEDIC SURGERY

## 2017-08-03 PROCEDURE — 80048 BASIC METABOLIC PNL TOTAL CA: CPT | Performed by: ORTHOPAEDIC SURGERY

## 2017-08-03 PROCEDURE — 97150 GROUP THERAPEUTIC PROCEDURES: CPT

## 2017-08-03 PROCEDURE — 99024 POSTOP FOLLOW-UP VISIT: CPT | Performed by: NURSE PRACTITIONER

## 2017-08-03 PROCEDURE — 97110 THERAPEUTIC EXERCISES: CPT

## 2017-08-03 RX ORDER — POLYETHYLENE GLYCOL 3350 17 G/17G
17 POWDER, FOR SOLUTION ORAL 2 TIMES DAILY
Qty: 30 EACH | Refills: 3 | Status: SHIPPED | OUTPATIENT
Start: 2017-08-03 | End: 2018-01-18

## 2017-08-03 RX ORDER — PSEUDOEPHEDRINE HCL 30 MG
100 TABLET ORAL 2 TIMES DAILY
Qty: 60 CAPSULE | Refills: 2 | Status: SHIPPED | OUTPATIENT
Start: 2017-08-03 | End: 2018-01-18

## 2017-08-03 RX ORDER — OXYCODONE AND ACETAMINOPHEN 7.5; 325 MG/1; MG/1
TABLET ORAL
Qty: 100 TABLET | Refills: 0 | Status: SHIPPED | OUTPATIENT
Start: 2017-08-03 | End: 2017-08-16 | Stop reason: SDUPTHER

## 2017-08-03 RX ORDER — ONDANSETRON 4 MG/1
4 TABLET, FILM COATED ORAL EVERY 6 HOURS PRN
Qty: 30 TABLET | Refills: 2 | Status: SHIPPED | OUTPATIENT
Start: 2017-08-03 | End: 2017-08-28

## 2017-08-03 RX ORDER — BISACODYL 5 MG/1
10 TABLET, DELAYED RELEASE ORAL DAILY PRN
Qty: 30 TABLET | Refills: 2 | Status: SHIPPED | OUTPATIENT
Start: 2017-08-03 | End: 2018-01-18

## 2017-08-03 RX ADMIN — MUPIROCIN: 20 OINTMENT TOPICAL at 17:21

## 2017-08-03 RX ADMIN — OXYCODONE HYDROCHLORIDE AND ACETAMINOPHEN 2 TABLET: 7.5; 325 TABLET ORAL at 08:01

## 2017-08-03 RX ADMIN — ASPIRIN 325 MG: 325 TABLET, DELAYED RELEASE ORAL at 17:21

## 2017-08-03 RX ADMIN — OXYCODONE HYDROCHLORIDE AND ACETAMINOPHEN 2 TABLET: 7.5; 325 TABLET ORAL at 12:43

## 2017-08-03 RX ADMIN — ATORVASTATIN CALCIUM 20 MG: 20 TABLET, FILM COATED ORAL at 08:01

## 2017-08-03 RX ADMIN — OXYCODONE HYDROCHLORIDE AND ACETAMINOPHEN 2 TABLET: 7.5; 325 TABLET ORAL at 21:56

## 2017-08-03 RX ADMIN — POLYETHYLENE GLYCOL 3350 17 G: 17 POWDER, FOR SOLUTION ORAL at 08:01

## 2017-08-03 RX ADMIN — OXYCODONE HYDROCHLORIDE AND ACETAMINOPHEN 1 TABLET: 7.5; 325 TABLET ORAL at 03:26

## 2017-08-03 RX ADMIN — OXYCODONE HYDROCHLORIDE AND ACETAMINOPHEN 2 TABLET: 7.5; 325 TABLET ORAL at 17:21

## 2017-08-03 RX ADMIN — ASPIRIN 325 MG: 325 TABLET, DELAYED RELEASE ORAL at 08:01

## 2017-08-03 RX ADMIN — MUPIROCIN: 20 OINTMENT TOPICAL at 08:01

## 2017-08-03 RX ADMIN — DOCUSATE SODIUM 100 MG: 100 CAPSULE, LIQUID FILLED ORAL at 17:21

## 2017-08-03 NOTE — PLAN OF CARE
Problem: Patient Care Overview (Adult)  Goal: Plan of Care Review  Outcome: Ongoing (interventions implemented as appropriate)    08/03/17 0353   Coping/Psychosocial Response Interventions   Plan Of Care Reviewed With patient   Patient Care Overview   Progress improving   Outcome Evaluation   Outcome Summary/Follow up Plan VSS. Pain controlled with PO pain med. Ambulates with assist x1 and walker. Voiding without difficulty. Discussed bp monitoring and medication r/t HTN. Plans to d/c to rehab.          Problem: Perioperative Period (Adult)  Goal: Signs and Symptoms of Listed Potential Problems Will be Absent or Manageable (Perioperative Period)  Outcome: Ongoing (interventions implemented as appropriate)    Problem: Knee Replacement, Total (Adult)  Goal: Signs and Symptoms of Listed Potential Problems Will be Absent or Manageable (Knee Replacement, Total)  Outcome: Ongoing (interventions implemented as appropriate)    Problem: Fall Risk (Adult)  Goal: Identify Related Risk Factors and Signs and Symptoms  Outcome: Outcome(s) achieved Date Met:  08/03/17  Goal: Absence of Falls  Outcome: Ongoing (interventions implemented as appropriate)

## 2017-08-03 NOTE — PLAN OF CARE
Problem: Patient Care Overview (Adult)  Goal: Plan of Care Review  Outcome: Ongoing (interventions implemented as appropriate)    08/03/17 1003   Coping/Psychosocial Response Interventions   Plan Of Care Reviewed With patient   Outcome Evaluation   Outcome Summary/Follow up Plan Pt increased ambulation distance and exercise tolerance. Progressing well w/ PT.

## 2017-08-03 NOTE — THERAPY TREATMENT NOTE
Acute Care - Physical Therapy Treatment Note  [facility]     Patient Name: Loulou Collier  : 1940  MRN: [id number]  Today's Date: 8/3/2017  Onset of Illness/Injury or Date of Surgery Date: 17  Date of Referral to PT: 17  Referring Physician: Rui Diaz    Admit Date: 2017    Visit Dx:    ICD-10-CM ICD-9-CM   1. Arthritis of left knee M19.90 716.96     Patient Active Problem List   Diagnosis   • Elevated cholesterol   • Benign essential HTN   • H/O hysterectomy for benign disease   • Status post total knee replacement, right   • Primary osteoarthritis of knee   • Arthritis of left knee   • Chronic pain of left knee               Adult Rehabilitation Note       17 1300 17 0900       Rehab Assessment/Intervention    Discipline physical therapy assistant  -RW physical therapy assistant  -RW     Document Type therapy note (daily note)  -RW therapy note (daily note)  -RW     Subjective Information agree to therapy  -RW agree to therapy  -RW     Patient Effort, Rehab Treatment good  -RW good  -RW     Precautions/Limitations fall precautions  -RW fall precautions  -RW     Recorded by [RW] Sarah Foster PTA [RW] Sarah Foster PTA     Pain Assessment    Pain Assessment 0-10  -RW 0-10  -RW     Pain Score 7  -RW 6  -RW     Pain Type Acute pain;Surgical pain  -RW Acute pain;Surgical pain  -RW     Pain Location Knee  -RW Knee  -RW     Pain Orientation Left  -RW Left  -RW     Pain Intervention(s) Medication (See MAR);Repositioned;Ambulation/increased activity  -RW Medication (See MAR);Repositioned;Ambulation/increased activity  -RW     Response to Interventions tolerated  -RW tolerated  -RW     Recorded by [RW] Sarah Foster PTA [RW] Sarah Foster PTA     Cognitive Assessment/Intervention    Current Cognitive/Communication Assessment functional  -RW functional  -RW     Orientation Status oriented x 4  -RW oriented x 4  -RW     Follows Commands/Answers Questions 100% of the  time  -% of the time  -RW     Personal Safety WNL/WFL  -RW WNL/WFL  -RW     Personal Safety Interventions fall prevention program maintained;gait belt;nonskid shoes/slippers when out of bed  -RW fall prevention program maintained;gait belt;nonskid shoes/slippers when out of bed  -RW     Recorded by [RW] Sarah Foster PTA [RW] Sarah Foster PTA     ROM (Range of Motion)    General ROM Detail  L knee 13-67'  -RW     Recorded by  [RW] Sarah Foster PTA     Bed Mobility, Assessment/Treatment    Bed Mob, Supine to Sit, [place] not tested  -RW not tested  -RW     Bed Mob, Sit to Supine, [place] not tested  -RW not tested  -RW     Bed Mobility, Comment Pt up in chair  -RW      Recorded by [RW] Sarah Foster PTA [RW] Sarah Foster PTA     Transfer Assessment/Treatment    Transfers, Sit-Stand [place] stand by assist;verbal cues required  -RW stand by assist;verbal cues required  -RW     Transfers, Stand-Sit [place] stand by assist  -RW stand by assist  -RW     Transfers, Sit-Stand-Sit, Assist Device rolling walker  -RW rolling walker  -RW     Recorded by [RW] Sarah Foster PTA [RW] Sarah Foster PTA     Gait Assessment/Treatment    Gait, [place] Level stand by assist;contact guard assist;verbal cues required  -RW contact guard assist;verbal cues required  -RW     Gait, Assistive Device rolling walker  -RW rolling walker  -RW     Gait, Distance (Feet) 35  -RW 45  -RW     Gait, Gait Pattern Analysis swing-to gait  -RW swing-to gait  -RW     Gait, Gait Deviations forward flexed posture;left:;antalgic;nikita decreased;step length decreased  -RW forward flexed posture;left:;antalgic;nikita decreased;step length decreased  -RW     Gait, Safety Issues step length decreased  -RW step length decreased  -RW     Recorded by [RW] Sarah Foster PTA [RW] Sarah Foster PTA     Therapy Exercises    Exercise Protocols total knee  -RW total knee  -RW     Total Knee Exercises left:;20  reps;completed protocol  -RW left:;15 reps;completed protocol  -RW     Recorded by [RW] Sarah Foster PTA [RW] Sarah Foster PTA     Positioning and Restraints    Pre-Treatment Position sitting in chair/recliner  -RW sitting in chair/recliner  -RW     Post Treatment Position other  -RW other  -RW     Other Position return to room with caregiver   family pushed pt back to room in chair. Notified to call RN  -RW return to room with caregiver   family pushed pt back to room and notified to call RN  -RW     Recorded by [RW] Sarah Foster PTA [RW] Sarah Foster PTA       User Key  (r) = Recorded By, (t) = Taken By, (c) = Cosigned By    Initials Name Effective Dates    RW Sarah Foster PTA 04/06/16 -                 IP PT Goals       08/02/17 1438          Bed Mobility PT LTG    Bed Mobility PT LTG, Date Established 08/02/17  -MS      Bed Mobility PT LTG, Time to Achieve 3 days  -MS      Bed Mobility PT LTG, Activity Type all bed mobility  -MS      Bed Mobility PT LTG, [place] Level supervision required  -MS      Transfer Training PT LTG    Transfer Training PT LTG, Date Established 08/02/17  -MS      Transfer Training PT LTG, Time to Achieve 3 days  -MS      Transfer Training PT LTG, Activity Type all transfers  -MS      Transfer Training PT LTG, [place] Level supervision required  -MS      Transfer Training PT LTG, Assist Device walker, rolling  -MS      Gait Training PT LTG    Gait Training Goal PT LTG, Date Established 08/02/17  -MS      Gait Training Goal PT LTG, Time to Achieve 3 days  -MS      Gait Training Goal PT LTG, [place] Level supervision required  -MS      Gait Training Goal PT LTG, Assist Device walker, rolling  -MS      Gait Training Goal PT LTG, Distance to Achieve 100 feet  -MS      Range of Motion PT LTG    Range of Motion Goal PT LTG, Date Established 08/02/17  -MS      Range of Motion Goal PT LTG, Time to Achieve 3 days  -MS      Range fo Motion Goal PT LTG, Joint L knee   -MS      Range of Motion Goal PT LTG, AROM Measure (5, 90)  -MS        User Key  (r) = Recorded By, (t) = Taken By, (c) = Cosigned By    Initials Name Provider Type    MS Tolu Poon, PT Physical Therapist          Physical Therapy Education     Title: PT OT SLP Therapies (Done)     Topic: Physical Therapy (Done)     Point: Mobility training (Done)    Learning Progress Summary    Learner Readiness Method Response Comment Documented by Status   Patient Acceptance E,TB,D VU,NR   08/03/17 1002 Done    Acceptance E,D VU,NR  MS 08/02/17 1437 Done               Point: Home exercise program (Done)    Learning Progress Summary    Learner Readiness Method Response Comment Documented by Status   Patient Acceptance E,TB,D VU,NR   08/03/17 1002 Done    Acceptance E,D VU,NR  MS 08/02/17 1437 Done               Point: Body mechanics (Done)    Learning Progress Summary    Learner Readiness Method Response Comment Documented by Status   Patient Acceptance E,TB,D VU,NR   08/03/17 1002 Done    Acceptance E,D VU,NR  MS 08/02/17 1437 Done               Point: Precautions (Done)    Learning Progress Summary    Learner Readiness Method Response Comment Documented by Status   Patient Acceptance E,TB,D VU,NR   08/03/17 1002 Done    Acceptance E,D VU,NR  MS 08/02/17 1437 Done                      User Key     Initials Effective Dates Name Provider Type Discipline    MS 12/01/15 -  Tolu Poon, PT Physical Therapist PT     04/06/16 -  Sarah Foster, PTA Physical Therapy Assistant PT                    PT Recommendation and Plan  Anticipated Discharge Disposition: skilled nursing facility  Planned Therapy Interventions: balance training, bed mobility training, gait training, home exercise program, patient/family education, postural re-education, strengthening, transfer training, ROM (Range of Motion)  PT Frequency: 2 times/day  Plan of Care Review  Plan Of Care Reviewed With: patient  Outcome Summary/Follow up Plan: Pt  increased ambulation distance and exercise tolerance. Progressing well w/ PT.          Outcome Measures       08/03/17 0900 08/02/17 1400       How much help from another person do you currently need...    Turning from your back to your side while in flat bed without using bedrails? 3  -RW 3  -MS     Moving from lying on back to sitting on the side of a flat bed without bedrails? 3  -RW 2  -MS     Moving to and from a bed to a chair (including a wheelchair)? 3  -RW 3  -MS     Standing up from a chair using your arms (e.g., wheelchair, bedside chair)? 3  -RW 3  -MS     Climbing 3-5 steps with a railing? 3  -RW 2  -MS     To walk in hospital room? 3  -RW 3  -MS     AM-PAC 6 Clicks Score 18  -RW 16  -MS     Functional Assessment    Outcome Measure Options AM-PAC 6 Clicks Basic Mobility (PT)  -RW AM-PAC 6 Clicks Basic Mobility (PT)  -MS       User Key  (r) = Recorded By, (t) = Taken By, (c) = Cosigned By    Initials Name Provider Type    MS Tolu PETERS Ayah, PT Physical Therapist    RW Sarah Foster PTA Physical Therapy Assistant           Time Calculation:         PT Charges       08/03/17 1435 08/03/17 0929       Time Calculation    Start Time 1316  -RW 0843  -RW     Stop Time 1350  -RW 0913  -RW     Time Calculation (min) 34 min  -RW 30 min  -RW     PT Received On 08/03/17  -RW 08/03/17  -RW     PT - Next Appointment 08/04/17  -RW 08/03/17  -       User Key  (r) = Recorded By, (t) = Taken By, (c) = Cosigned By    Initials Name Provider Type     Sarah Foster PTA Physical Therapy Assistant          Therapy Charges for Today     Code Description Service Date Service Provider Modifiers Qty    31914403257 HC PT THER PROC EA 15 MIN 8/3/2017 Sarah Foster PTA GP 1    07553883099 HC PT THER PROC GROUP 8/3/2017 Sarah Foster PTA GP 1    01746184379 HC PT THER PROC EA 15 MIN 8/3/2017 Sarah Foster PTA GP 1    66259126093 HC PT THER PROC GROUP 8/3/2017 Sarah Foster PTA GP 1          PT G-Codes  Outcome  Measure Options: AM-PAC 6 Clicks Basic Mobility (PT)    Sarah Foster, PTA  8/3/2017

## 2017-08-03 NOTE — THERAPY TREATMENT NOTE
Acute Care - Physical Therapy Treatment Note  [facility]     Patient Name: Loulou Collier  : 1940  MRN: [id number]  Today's Date: 8/3/2017  Onset of Illness/Injury or Date of Surgery Date: 17  Date of Referral to PT: 17  Referring Physician: Rui Diaz    Admit Date: 2017    Visit Dx:    ICD-10-CM ICD-9-CM   1. Arthritis of left knee M19.90 716.96     Patient Active Problem List   Diagnosis   • Elevated cholesterol   • Benign essential HTN   • H/O hysterectomy for benign disease   • Status post total knee replacement, right   • Primary osteoarthritis of knee   • Arthritis of left knee   • Chronic pain of left knee               Adult Rehabilitation Note       17 0900          Rehab Assessment/Intervention    Discipline physical therapy assistant  -RW      Document Type therapy note (daily note)  -RW      Subjective Information agree to therapy  -RW      Patient Effort, Rehab Treatment good  -RW      Precautions/Limitations fall precautions  -RW      Recorded by [RW] Sarah Foster PTA      Pain Assessment    Pain Assessment 0-10  -RW      Pain Score 6  -RW      Pain Type Acute pain;Surgical pain  -RW      Pain Location Knee  -RW      Pain Orientation Left  -RW      Pain Intervention(s) Medication (See MAR);Repositioned;Ambulation/increased activity  -RW      Response to Interventions tolerated  -RW      Recorded by [RW] Sarah Foster PTA      Cognitive Assessment/Intervention    Current Cognitive/Communication Assessment functional  -RW      Orientation Status oriented x 4  -RW      Follows Commands/Answers Questions 100% of the time  -RW      Personal Safety WNL/WFL  -RW      Personal Safety Interventions fall prevention program maintained;gait belt;nonskid shoes/slippers when out of bed  -RW      Recorded by [RW] Sarah Foster PTA      ROM (Range of Motion)    General ROM Detail L knee '  -RW      Recorded by [RW] Sarah Foster PTA      Bed Mobility,  Assessment/Treatment    Bed Mob, Supine to Sit, [place] not tested  -RW      Bed Mob, Sit to Supine, [place] not tested  -RW      Recorded by [RW] Sarah Foster PTA      Transfer Assessment/Treatment    Transfers, Sit-Stand [place] stand by assist;verbal cues required  -RW      Transfers, Stand-Sit [place] stand by assist  -RW      Transfers, Sit-Stand-Sit, Assist Device rolling walker  -RW      Recorded by [RW] Sarah Foster PTA      Gait Assessment/Treatment    Gait, [place] Level contact guard assist;verbal cues required  -RW      Gait, Assistive Device rolling walker  -RW      Gait, Distance (Feet) 45  -RW      Gait, Gait Pattern Analysis swing-to gait  -RW      Gait, Gait Deviations forward flexed posture;left:;antalgic;nikita decreased;step length decreased  -RW      Gait, Safety Issues step length decreased  -RW      Recorded by [RW] Sarah Foster PTA      Therapy Exercises    Exercise Protocols total knee  -RW      Total Knee Exercises left:;15 reps;completed protocol  -RW      Recorded by [RW] Sarah Foster PTA      Positioning and Restraints    Pre-Treatment Position sitting in chair/recliner  -RW      Post Treatment Position other  -RW      Other Position return to room with caregiver   family pushed pt back to room and notified to call RN  -RW      Recorded by [RW] Sarah Foster PTA        User Key  (r) = Recorded By, (t) = Taken By, (c) = Cosigned By    Initials Name Effective Dates    KELTON Foster PTA 04/06/16 -                 IP PT Goals       08/02/17 1438          Bed Mobility PT LTG    Bed Mobility PT LTG, Date Established 08/02/17  -MS      Bed Mobility PT LTG, Time to Achieve 3 days  -MS      Bed Mobility PT LTG, Activity Type all bed mobility  -MS      Bed Mobility PT LTG, [place] Level supervision required  -MS      Transfer Training PT LTG    Transfer Training PT LTG, Date Established 08/02/17  -MS      Transfer Training PT LTG, Time to  Achieve 3 days  -MS      Transfer Training PT LTG, Activity Type all transfers  -MS      Transfer Training PT LTG, [place] Level supervision required  -MS      Transfer Training PT LTG, Assist Device walker, rolling  -MS      Gait Training PT LTG    Gait Training Goal PT LTG, Date Established 08/02/17  -MS      Gait Training Goal PT LTG, Time to Achieve 3 days  -MS      Gait Training Goal PT LTG, [place] Level supervision required  -MS      Gait Training Goal PT LTG, Assist Device walker, rolling  -MS      Gait Training Goal PT LTG, Distance to Achieve 100 feet  -MS      Range of Motion PT LTG    Range of Motion Goal PT LTG, Date Established 08/02/17  -MS      Range of Motion Goal PT LTG, Time to Achieve 3 days  -MS      Range fo Motion Goal PT LTG, Joint L knee  -MS      Range of Motion Goal PT LTG, AROM Measure (5, 90)  -MS        User Key  (r) = Recorded By, (t) = Taken By, (c) = Cosigned By    Initials Name Provider Type    MS Tolu Poon, PT Physical Therapist          Physical Therapy Education     Title: PT OT SLP Therapies (Done)     Topic: Physical Therapy (Done)     Point: Mobility training (Done)    Learning Progress Summary    Learner Readiness Method Response Comment Documented by Status   Patient Acceptance E,TB,D VU,NR   08/03/17 1002 Done    Acceptance E,D VU,NR  MS 08/02/17 1437 Done               Point: Home exercise program (Done)    Learning Progress Summary    Learner Readiness Method Response Comment Documented by Status   Patient Acceptance E,TB,D VU,NR   08/03/17 1002 Done    Acceptance E,D VU,NR  MS 08/02/17 1437 Done               Point: Body mechanics (Done)    Learning Progress Summary    Learner Readiness Method Response Comment Documented by Status   Patient Acceptance E,TB,D VU,NR   08/03/17 1002 Done    Acceptance E,D VU,NR  MS 08/02/17 1437 Done               Point: Precautions (Done)    Learning Progress Summary    Learner Readiness Method Response Comment  Documented by Status   Patient Acceptance E,TB,D VU,NR  RW 08/03/17 1002 Done    Acceptance E,D JERAMY,NR  MS 08/02/17 1437 Done                      User Key     Initials Effective Dates Name Provider Type Discipline    MS 12/01/15 -  Tolu L Ayah, PT Physical Therapist PT     04/06/16 -  Sarah Foster PTA Physical Therapy Assistant PT                    PT Recommendation and Plan  Anticipated Discharge Disposition: skilled nursing facility  Planned Therapy Interventions: balance training, bed mobility training, gait training, home exercise program, patient/family education, postural re-education, strengthening, transfer training, ROM (Range of Motion)  PT Frequency: 2 times/day  Plan of Care Review  Plan Of Care Reviewed With: patient  Outcome Summary/Follow up Plan: Pt increased ambulation distance and exercise tolerance. Progressing well w/ PT.          Outcome Measures       08/03/17 0900 08/02/17 1400       How much help from another person do you currently need...    Turning from your back to your side while in flat bed without using bedrails? 3  -RW 3  -MS     Moving from lying on back to sitting on the side of a flat bed without bedrails? 3  -RW 2  -MS     Moving to and from a bed to a chair (including a wheelchair)? 3  -RW 3  -MS     Standing up from a chair using your arms (e.g., wheelchair, bedside chair)? 3  -RW 3  -MS     Climbing 3-5 steps with a railing? 3  -RW 2  -MS     To walk in hospital room? 3  -RW 3  -MS     AM-PAC 6 Clicks Score 18  -RW 16  -MS     Functional Assessment    Outcome Measure Options AM-PAC 6 Clicks Basic Mobility (PT)  -RW AM-PAC 6 Clicks Basic Mobility (PT)  -MS       User Key  (r) = Recorded By, (t) = Taken By, (c) = Cosigned By    Initials Name Provider Type    MS Tolu Poon, PT Physical Therapist    RW Sarah Foster, BABAK Physical Therapy Assistant           Time Calculation:         PT Charges       08/03/17 0929          Time Calculation    Start Time 0843  -RW       Stop Time 0913  -      Time Calculation (min) 30 min  -RW      PT Received On 08/03/17  -      PT - Next Appointment 08/03/17  -        User Key  (r) = Recorded By, (t) = Taken By, (c) = Cosigned By    Initials Name Provider Type     Sarah Foster PTA Physical Therapy Assistant          Therapy Charges for Today     Code Description Service Date Service Provider Modifiers Qty    94275510311 HC PT THER PROC EA 15 MIN 8/3/2017 Sarah Foster PTA GP 1    48909330273 HC PT THER PROC GROUP 8/3/2017 Sarah Foster PTA GP 1          PT G-Codes  Outcome Measure Options: AM-PAC 6 Clicks Basic Mobility (PT)    Sarah Foster PTA  8/3/2017

## 2017-08-03 NOTE — PLAN OF CARE
Problem: Patient Care Overview (Adult)  Goal: Plan of Care Review  Outcome: Ongoing (interventions implemented as appropriate)    08/03/17 6436   Coping/Psychosocial Response Interventions   Plan Of Care Reviewed With patient   Patient Care Overview   Progress improving   Outcome Evaluation   Outcome Summary/Follow up Plan PT WORKED C PTX2, PAIN TOLERABLE, AMBULATING, ADEQUATE PO INTAKE, VSS- NO HTN NOTED       Goal: Adult Individualization and Mutuality  Outcome: Ongoing (interventions implemented as appropriate)  Goal: Discharge Needs Assessment  Outcome: Ongoing (interventions implemented as appropriate)    Problem: Knee Replacement, Total (Adult)  Goal: Signs and Symptoms of Listed Potential Problems Will be Absent or Manageable (Knee Replacement, Total)  Outcome: Ongoing (interventions implemented as appropriate)    Problem: Fall Risk (Adult)  Goal: Absence of Falls  Outcome: Ongoing (interventions implemented as appropriate)

## 2017-08-03 NOTE — PROGRESS NOTES
Discharge Planning Assessment  [facility]     Patient Name: Loulou Collier  MRN: [id number]  Today's Date: 8/3/2017    Admit Date: 8/2/2017          Discharge Needs Assessment       08/03/17 1544    Living Environment    Lives With child(paolo), adult    Living Arrangements house    Transportation Available car;family or friend will provide    Discharge Needs Assessment    Concerns To Be Addressed basic needs concerns    Readmission Within The Last 30 Days no previous admission in last 30 days    Equipment Currently Used at Home walker, rolling    Discharge Facility/Level Of Care Needs nursing facility, skilled            Discharge Plan       08/03/17 1607    Case Management/Social Work Plan    Additional Comments Spoke with pt and family, verified correct information on facesheet and explained the role of CCP. Pt would like to d/c to [facility], referral given to Anita/Catarina with [facility] who states they are able to accept, precert pending. Plan will be to d/c to SNF skilled. No other needs identified at this time.      08/03/17 1553    Case Management/Social Work Plan    Plan [facility] skilled    Patient/Family In Agreement With Plan yes        Discharge Placement     Facility/Agency Request Status Selected? Address Phone Number Fax Number    [facility] Accepted   Precert pending Yes [address] [id number] [phone]                Demographic Summary     None            Functional Status     None            Psychosocial     None            Abuse/Neglect     None            Legal     None            Substance Abuse     None            Patient Forms     None          Grace Monge RN

## 2017-08-03 NOTE — PROGRESS NOTES
Orthopedic Total Joint Progress Note        Patient: Loulou Collier    Date of Admission: 8/2/2017  7:10 AM    YOB: 1940    Medical Record Number: [id number]    Attending Physician: Rui Craft MD      POD # 1 Day Post-Op Procedure(s) (LRB):  TOTAL KNEE ARTHROPLASTY WITH NITIN NAVIGATION (Left)       Systemic or Specific Complaints: No Complaints      Allergies:   Allergies   Allergen Reactions   • Cephalosporins Hives   • Penicillins Hives       Medications:   Current Medications:  Scheduled Meds:  aspirin 325 mg Oral BID   atorvastatin 20 mg Oral Daily   docusate sodium 100 mg Oral BID   hydrochlorothiazide 12.5 mg Oral QAM   metoprolol succinate XL 50 mg Oral QAM   mupirocin  Nasal BID   polyethylene glycol 17 g Oral BID   tranexamic acid (CYLOKAPRON) 1500 mg in 0.9 NS for irrigation 1,500 mg Topical Once   tranexamic acid (CYLOKAPRON) 1500 mg in 0.9 NS for irrigation 1,500 mg Topical Once   vancomycin 15 mg/kg Intravenous Once     Continuous Infusions:  HYDROmorphone HCl-NaCl     lactated ringers 100 mL/hr Last Rate: 100 mL/hr (08/02/17 2052)     PRN Meds:.•  acetaminophen  •  bisacodyl  •  bisacodyl  •  diphenhydrAMINE  •  diphenhydrAMINE  •  magnesium hydroxide  •  naloxone  •  ondansetron **OR** ondansetron ODT **OR** ondansetron  •  oxyCODONE-acetaminophen  •  oxyCODONE-acetaminophen  •  promethazine      Physical Exam: 77 y.o. female Body mass index is 36.48 kg/(m^2).  General Appearance:    alert and oriented            Pain Relief: Patient reports good relief Vitals:    08/02/17 2011 08/02/17 2350 08/03/17 0426 08/03/17 0700   BP: 122/75 124/74 126/72 95/58   BP Location: Left arm Left arm Left arm Left arm   Patient Position: Sitting Lying Lying Sitting   Pulse: 73 86 85 71   Resp: 16 16 16 16   Temp: 96.7 °F (35.9 °C) 97.7 °F (36.5 °C) 97.1 °F (36.2 °C) 97 °F (36.1 °C)   TempSrc: Oral Oral Oral Oral   SpO2: 100% 97% 98% 99%   Weight:       Height:              HEENT:     Normocephalic, without obvious abnormality, atraumatic.          PERRLA; EOMI; Neck supple with trachea midline. No JVD.       Lungs:     Respirations regular, even and unlabored      Heart:    Regular rhythm and normal rate.   Abdomen:     Normal bowel sounds, soft non-tender, non-distended       Extremities:   Operative extremity neurovascular status intact. ROM intact.    Incision intact w/out signs or symptoms of infection.  No cyanosis, no calf tenderness     Pulses:     Pulses palpable and equal bilaterally     Skin:     Skin Warm/Dry w/out cyanosis     Activity: Mobilizing Per P.T.   Weight Bearing: As Tolerated    Diagnostic Tests:   Admission on 08/02/2017   Component Date Value Ref Range Status   • Glucose 08/03/2017 125* 65 - 99 mg/dL Final   • BUN 08/03/2017 14  8 - 23 mg/dL Final   • Creatinine 08/03/2017 0.79  0.57 - 1.00 mg/dL Final   • Sodium 08/03/2017 143  136 - 145 mmol/L Final   • Potassium 08/03/2017 4.5  3.5 - 5.2 mmol/L Final   • Chloride 08/03/2017 105  98 - 107 mmol/L Final   • CO2 08/03/2017 25.9  22.0 - 29.0 mmol/L Final   • Calcium 08/03/2017 8.4* 8.6 - 10.5 mg/dL Final   • eGFR   Amer 08/03/2017 86  >60 mL/min/1.73 Final   • BUN/Creatinine Ratio 08/03/2017 17.7  7.0 - 25.0 Final   • Anion Gap 08/03/2017 12.1  mmol/L Final   • Hemoglobin 08/03/2017 11.5* 11.9 - 15.5 g/dL Final   • Hematocrit 08/03/2017 36.3  35.6 - 45.5 % Final       Xr Knee 1 Or 2 View Left    Result Date: 8/2/2017  Narrative: XR KNEE 1 OR 2 VW LEFT-  POSTOP PORTABLE 2 VIEWS LEFT KNEE  CLINICAL INFORMATION: Post arthroplasty  FINDINGS: Prosthesis is satisfactory in position. A complicating process is not identified.  This report was finalized on 8/2/2017 11:46 AM by Dr. Josue Farah MD.        Personally viewed Yes    Assessment:  Doing well POD  # 1 Day Post-Op following total joint replacement  Acute Blood Loss Anemia, stable  Post-operative Pain  Limited mobility, requires use of walker and assistance when  OOB.    Patient Active Problem List   Diagnosis   • Elevated cholesterol   • Benign essential HTN   • H/O hysterectomy for benign disease   • Status post total knee replacement, right   • Primary osteoarthritis of knee   • Arthritis of left knee   • Chronic pain of left knee        Plan:  Continue to monitor labs and/or v/s, for tolerance to post op blood loss.  Continue efforts to increase mobilization.  Continue Pain Control Measures.  Continue incisional Care.  DVT prophylaxis.  Follow up in office with Rui Craft M.D. In 2 weeks.    Discharge Plan:POD 2-3 to [facility]    Date: 8/3/2017  DARON Lester  Seen today by Rui Craft M.D. and DARON Villafana

## 2017-08-04 VITALS
HEIGHT: 63 IN | SYSTOLIC BLOOD PRESSURE: 109 MMHG | DIASTOLIC BLOOD PRESSURE: 72 MMHG | OXYGEN SATURATION: 97 % | RESPIRATION RATE: 20 BRPM | WEIGHT: 202.7 LBS | TEMPERATURE: 97.5 F | HEART RATE: 96 BPM | BODY MASS INDEX: 35.91 KG/M2

## 2017-08-04 LAB
HCT VFR BLD AUTO: 34.4 % (ref 35.6–45.5)
HGB BLD-MCNC: 10.8 G/DL (ref 11.9–15.5)

## 2017-08-04 PROCEDURE — 85014 HEMATOCRIT: CPT | Performed by: ORTHOPAEDIC SURGERY

## 2017-08-04 PROCEDURE — 97110 THERAPEUTIC EXERCISES: CPT

## 2017-08-04 PROCEDURE — 85018 HEMOGLOBIN: CPT | Performed by: ORTHOPAEDIC SURGERY

## 2017-08-04 PROCEDURE — 99024 POSTOP FOLLOW-UP VISIT: CPT | Performed by: NURSE PRACTITIONER

## 2017-08-04 PROCEDURE — 97150 GROUP THERAPEUTIC PROCEDURES: CPT

## 2017-08-04 RX ADMIN — POLYETHYLENE GLYCOL 3350 17 G: 17 POWDER, FOR SOLUTION ORAL at 08:12

## 2017-08-04 RX ADMIN — ATORVASTATIN CALCIUM 20 MG: 20 TABLET, FILM COATED ORAL at 08:12

## 2017-08-04 RX ADMIN — OXYCODONE HYDROCHLORIDE AND ACETAMINOPHEN 2 TABLET: 7.5; 325 TABLET ORAL at 12:28

## 2017-08-04 RX ADMIN — ASPIRIN 325 MG: 325 TABLET, DELAYED RELEASE ORAL at 08:12

## 2017-08-04 RX ADMIN — DOCUSATE SODIUM 100 MG: 100 CAPSULE, LIQUID FILLED ORAL at 08:11

## 2017-08-04 RX ADMIN — OXYCODONE HYDROCHLORIDE AND ACETAMINOPHEN 2 TABLET: 7.5; 325 TABLET ORAL at 03:23

## 2017-08-04 RX ADMIN — OXYCODONE HYDROCHLORIDE AND ACETAMINOPHEN 2 TABLET: 7.5; 325 TABLET ORAL at 07:40

## 2017-08-04 RX ADMIN — METOPROLOL SUCCINATE 50 MG: 50 TABLET, FILM COATED, EXTENDED RELEASE ORAL at 08:11

## 2017-08-04 NOTE — PLAN OF CARE
Problem: Patient Care Overview (Adult)  Goal: Plan of Care Review    08/04/17 0330 08/04/17 0332   Coping/Psychosocial Response Interventions   Plan Of Care Reviewed With --  patient   Patient Care Overview   Progress --  improving   Outcome Evaluation   Outcome Summary/Follow up Plan VSS. Pain controlled with PO pain med. Ambulates with assist x 1 and walker. Voiding without difficulty. Discussed BP monitoring r/t HTN. Plans to d/c to rehab.  --

## 2017-08-04 NOTE — DISCHARGE SUMMARY
Orthopedic Discharge Summary      Patient: Loulou Collier  YOB: 1940  Medical Record Number: [id number]    Attending Physician: Rui Craft MD  Consulting Physician(s): none    Date of Admission: 8/2/2017  7:10 AM  Date of Discharge: 8/4/17    Discharge Diagnosis: TOTAL KNEE ARTHROPLASTY WITH NITIN NAVIGATION,   Acute Blood Loss Anemia, stable  Post-operative Pain  Limited mobility, requires use of walker and assistance when OOB.    Presenting Problem/History of Present Illness: Arthritis of left knee [M19.90]  Arthritis of left knee [M19.90]  Chronic pain of left knee [M25.562, G89.29]  Arthritis of left knee [M19.90]        Allergies:   Allergies   Allergen Reactions   • Cephalosporins Hives   • Penicillins Hives       Discharge Medications     Loulou Collier   Home Medication Instructions GABRIELLA:[id number]    Printed on:08/04/17 0816   Medication Information                      aspirin  MG EC tablet  Take 1 tablet by mouth 2 (Two) Times a Day for 82 doses.             bisacodyl (DULCOLAX) 5 MG EC tablet  Take 2 tablets by mouth Daily As Needed for Constipation.             docusate sodium 100 MG capsule  Take 100 mg by mouth 2 (Two) Times a Day.             hydrochlorothiazide (HYDRODIURIL) 12.5 MG tablet  Take 12.5 mg by mouth Every Morning.             metoprolol succinate XL (TOPROL-XL) 50 MG 24 hr tablet  Take 50 mg by mouth Every Morning.             ondansetron (ZOFRAN) 4 MG tablet  Take 1 tablet by mouth Every 6 (Six) Hours As Needed for Nausea or Vomiting.             oxyCODONE-acetaminophen (PERCOCET) 7.5-325 MG per tablet  1-2 tabs po q 3-4 hr prn severe pain, wean as tolerated             polyethylene glycol (MIRALAX) packet  Take 17 g by mouth 2 (Two) Times a Day.             simvastatin (ZOCOR) 40 MG tablet  Take 40 mg by mouth Every Night.                   Past Medical History:   Diagnosis Date   • Arthritis    • High cholesterol    • Hypertension         Past Surgical  History:   Procedure Laterality Date   • BREAST BIOPSY     • HYSTERECTOMY     • JOINT REPLACEMENT      RT KNEE   • KNEE SURGERY     • TOTAL KNEE ARTHROPLASTY Left 8/2/2017    Procedure: TOTAL KNEE ARTHROPLASTY WITH NITIN NAVIGATION;  Surgeon: Rui Craft MD;  Location: [facility] OR;  Service:         Social History     Occupational History   • Not on file.     Social History Main Topics   • Smoking status: Never Smoker   • Smokeless tobacco: Never Used   • Alcohol use No   • Drug use: No   • Sexual activity: Not on file    Social History     Social History Narrative        Family History   Problem Relation Age of Onset   • Hypertension Other    • Cancer Other    • Diabetes Other    • No Known Problems Mother    • No Known Problems Father    • No Known Problems Sister    • No Known Problems Brother    • No Known Problems Daughter    • No Known Problems Son    • No Known Problems Maternal Grandmother    • No Known Problems Paternal Grandmother    • No Known Problems Maternal Aunt    • No Known Problems Paternal Aunt    • BRCA 1/2 Neg Hx    • Breast cancer Neg Hx    • Colon cancer Neg Hx    • Endometrial cancer Neg Hx    • Ovarian cancer Neg Hx    • Malig Hyperthermia Neg Hx          Physical Exam: 77 y.o. female Body mass index is 36.48 kg/(m^2).    General Appearance:    Alert, cooperative, in no acute distress                    Vitals:    08/03/17 2020 08/04/17 0005 08/04/17 0300 08/04/17 0731   BP: 120/68 117/74 116/71 113/60   BP Location: Left arm Left arm Left arm Left arm   Patient Position: Sitting Lying Lying Lying   Pulse: 85 106 108 118   Resp: 16 16 16 18   Temp: 99.2 °F (37.3 °C) 98.1 °F (36.7 °C) 98.6 °F (37 °C) 97.6 °F (36.4 °C)   TempSrc: Oral Oral Oral Oral   SpO2: 94% 94% 91% 98%   Weight:       Height:            DIAGNOSTIC TESTS:   Admission on 08/02/2017   Component Date Value Ref Range Status   • Glucose 08/03/2017 125* 65 - 99 mg/dL Final   • BUN 08/03/2017 14  8 - 23 mg/dL Final   •  Creatinine 08/03/2017 0.79  0.57 - 1.00 mg/dL Final   • Sodium 08/03/2017 143  136 - 145 mmol/L Final   • Potassium 08/03/2017 4.5  3.5 - 5.2 mmol/L Final   • Chloride 08/03/2017 105  98 - 107 mmol/L Final   • CO2 08/03/2017 25.9  22.0 - 29.0 mmol/L Final   • Calcium 08/03/2017 8.4* 8.6 - 10.5 mg/dL Final   • eGFR   Amer 08/03/2017 86  >60 mL/min/1.73 Final   • BUN/Creatinine Ratio 08/03/2017 17.7  7.0 - 25.0 Final   • Anion Gap 08/03/2017 12.1  mmol/L Final   • Hemoglobin 08/03/2017 11.5* 11.9 - 15.5 g/dL Final   • Hematocrit 08/03/2017 36.3  35.6 - 45.5 % Final   • Hemoglobin 08/04/2017 10.8* 11.9 - 15.5 g/dL Final   • Hematocrit 08/04/2017 34.4* 35.6 - 45.5 % Final       Xr Knee 1 Or 2 View Left    Result Date: 8/2/2017  Narrative: XR KNEE 1 OR 2 VW LEFT-  POSTOP PORTABLE 2 VIEWS LEFT KNEE  CLINICAL INFORMATION: Post arthroplasty  FINDINGS: Prosthesis is satisfactory in position. A complicating process is not identified.  This report was finalized on 8/2/2017 11:46 AM by Dr. Josue Farah MD.        Hospital Course:  77 y.o. female admitted to [facility] to services of Rui Craft MD with Arthritis of left knee [M19.90]  Arthritis of left knee [M19.90]  Chronic pain of left knee [M25.562, G89.29]  Arthritis of left knee [M19.90] on 8/2/2017 and underwent TOTAL KNEE ARTHROPLASTY WITH NITIN NAVIGATION  Per Rui Craft MD. Antibiotic and VTE prophylaxis were per SCIP protocols. Post-operatively the patient transferred to the post-operative floor where the patient underwent mobilization therapy that included active as well as passive ROM exercises. Opioids were titrated to achieve appropriate pain management to allow for participation in mobilization exercises. Vital signs are now stable. On the day of discharge the wound was clean, dry and intact and calf was soft and non tender and Homans sign was negative. Operative extremity neurovascular status remains intact.   Appropriate education re:  incision care, activity levels, medications, and follow up visits was completed and all questions were answered. The patient is now deemed stable for discharge.    Condition on Discharge:  Stable    Discharge Instructions: Patient is to continue with physical therapy exercises twice daily and continue working with the physical therapist as ordered. Patient may weight bear as tolerated unless otherwise specified. Continue KEEGAN hose daily (for six weeks) and ice regularly. Patient instructed on frequent calf pumping exercises.  Patient also instructed on incentive spirometer during hospitalization and encouraged to continue to use at home regularly.     For Total Knee Replacement: May shower on POD#5.  The wound should be gently cleaned with antibacterial soap then dried and covered with a dry sterile dressing. The wound should be covered at all times except while showering until all drainage has stopped.  Patient may change dressings daily and prn using sterile 4x4 and paper tape, and should call if any unusual drainage, redness or swelling. Staples will be removed in the office in 2 weeks.      Follow up Instructions:  Follow up in the office with Dr. Rui Alcantara in 2 weeks - patient to call the office at [phone] to schedule. Prescriptions were given for pain medication and blood thinner therapy.    Follow-up Appointments  Future Appointments  Date Time Provider Department Center   8/16/2017 3:30 PM MD KAMRYN Jimenez [facility] None   9/13/2017 10:40 AM MD KAMRYN Cruz Jr. None     Additional Instructions for the Follow-ups that You Need to Schedule     Ambulatory Referral to Home Health    As directed    Face to Face Visit Date:  8/2/2017   Follow-up Provider for Plan of Care?:  I will be treating the patient on an ongoing basis.  Please send me the Plan of Care for signature.   Follow-up Provider:  RUI ALCANTARA   Reason/Clinical Findings:  post operative pain with ambulation, requires use  of walker for ambulation   Describe mobility limitations that make leaving home difficult:  requires assist of another to leave  home   Nursing/Therapeutic Services Requested:   Skilled Nursing  Physical Therapy      Skilled nursing orders:  Wound care dressing/changes   PT orders:  Total joint pathway   Frequency:  1 Week 1       Dressing Change Instructions    As directed    IV. INCISION CARE: May shower and let water run over the incision on post-operative day #5. Do not scrub or rub the incision. Simply let the water run over the incision and pat dry. Keep covered with clean dry dressing as long as there is drainage.    Wash your hands prior to dressing changes  Change the dressing daily as needed to keep incision clean and dry. Utilize dry gauze and paper tape. Avoid touching the side of the gauze that goes against the incision with your hands.  No creams or ointments to the incision  May remove dressing once the incision is free of drainage usually around 5 days post op.  Do not touch or pick at the incision  Check incision every day and notify surgeon immediately if any of the following signs or symptoms are noted:  Increase in baseline redness (bruising is normal)  Increase in baseline swelling around the incision and of the entire extremity  Sudden increase in pain or inability to bend the knee  Drainage oozing from the incision more than 5 days out from surgery.  Pulling apart of the edges of the incision  Increase in overall body temperature (greater than 100.5 degrees) Make sure you are doing your incentive spirometer and deep breathing exercises every day while awake as this is the most frequent cause of low grade fever post operatively.  Your staple removal will be done in the office at your follow-up visit.       Return to Clinic for Follow Up (Specify Provider)    As directed    To:  Rui Craft M.D. at [facility] [phone]   Follow Up:  2 Weeks                  Discharge Disposition Plan:today to [facility] when precert clears.    Date: 8/4/2017    Rui Craft MD

## 2017-08-04 NOTE — THERAPY TREATMENT NOTE
Acute Care - Physical Therapy Treatment Note  [facility]     Patient Name: Loulou Collier  : 1940  MRN: [id number]  Today's Date: 2017  Onset of Illness/Injury or Date of Surgery Date: 17  Date of Referral to PT: 17  Referring Physician: Rui Diaz    Admit Date: 2017    Visit Dx:    ICD-10-CM ICD-9-CM   1. Arthritis of left knee M19.90 716.96     Patient Active Problem List   Diagnosis   • Elevated cholesterol   • Benign essential HTN   • H/O hysterectomy for benign disease   • Status post total knee replacement, right   • Primary osteoarthritis of knee   • Arthritis of left knee   • Chronic pain of left knee               Adult Rehabilitation Note       17 0900 17 1300 17 09    Rehab Assessment/Intervention    Discipline physical therapy assistant  -RW physical therapy assistant  -RW physical therapy assistant  -RW    Document Type therapy note (daily note)  -RW therapy note (daily note)  -RW therapy note (daily note)  -RW    Subjective Information agree to therapy  -RW agree to therapy  -RW agree to therapy  -RW    Patient Effort, Rehab Treatment good  -RW good  -RW good  -RW    Precautions/Limitations fall precautions  -RW fall precautions  -RW fall precautions  -RW    Recorded by [RW] Sarah Foster, BABAK [RW] Sarah Fotser, BABAK [RW] Sarah Foster PTA    Pain Assessment    Pain Assessment 0-10  -RW 0-10  -RW 0-10  -RW    Pain Score 6  -RW 7  -RW 6  -RW    Pain Type Acute pain;Surgical pain  -RW Acute pain;Surgical pain  -RW Acute pain;Surgical pain  -RW    Pain Location Knee  -RW Knee  -RW Knee  -RW    Pain Orientation Left  -RW Left  -RW Left  -RW    Pain Intervention(s) Medication (See MAR);Repositioned;Ambulation/increased activity  -RW Medication (See MAR);Repositioned;Ambulation/increased activity  -RW Medication (See MAR);Repositioned;Ambulation/increased activity  -RW    Response to Interventions tolerated  -RW tolerated  -RW tolerated  -RW     Recorded by [RW] Sarah Foster PTA [RW] Sarah Foster, BABAK [RW] Sarah Foster PTA    Cognitive Assessment/Intervention    Current Cognitive/Communication Assessment functional  -RW functional  -RW functional  -RW    Orientation Status oriented x 4  -RW oriented x 4  -RW oriented x 4  -RW    Follows Commands/Answers Questions 100% of the time  -% of the time  -% of the time  -RW    Personal Safety WNL/WFL  -RW WNL/WFL  -RW WNL/WFL  -RW    Personal Safety Interventions fall prevention program maintained;gait belt;nonskid shoes/slippers when out of bed  -RW fall prevention program maintained;gait belt;nonskid shoes/slippers when out of bed  -RW fall prevention program maintained;gait belt;nonskid shoes/slippers when out of bed  -RW    Recorded by [RW] Sarah Foster PTA [RW] Sarah Foster, BABAK [RW] Sarah Foster PTA    ROM (Range of Motion)    General ROM Detail L knee 13-74'  -RW  L knee 13-67'  -RW    Recorded by [RW] Sarah Foster, BABAK  [RW] Sarah Foster PTA    Bed Mobility, Assessment/Treatment    Bed Mob, Supine to Sit, [place] not tested  -RW not tested  -RW not tested  -RW    Bed Mob, Sit to Supine, [place] not tested  -RW not tested  -RW not tested  -RW    Bed Mobility, Comment Pt up in chair  -RW Pt up in chair  -RW     Recorded by [RW] Sarah Foster PTA [RW] Sarah Foster, PTA [RW] Sarah Foster PTA    Transfer Assessment/Treatment    Transfers, Sit-Stand [place] stand by assist  -RW stand by assist;verbal cues required  -RW stand by assist;verbal cues required  -RW    Transfers, Stand-Sit [place] stand by assist  -RW stand by assist  -RW stand by assist  -RW    Transfers, Sit-Stand-Sit, Assist Device rolling walker  -RW rolling walker  -RW rolling walker  -RW    Recorded by [RW] Sarah Fsoter, BABAK [RW] Sarah Foster, PTA [RW] Sarah Foster PTA    Gait Assessment/Treatment    Gait, [place] Level stand by assist;contact guard  assist;verbal cues required  -RW stand by assist;contact guard assist;verbal cues required  -RW contact guard assist;verbal cues required  -RW    Gait, Assistive Device rolling walker  -RW rolling walker  -RW rolling walker  -RW    Gait, Distance (Feet) 75  -RW 35  -RW 45  -RW    Gait, Gait Pattern Analysis swing-to gait  -RW swing-to gait  -RW swing-to gait  -RW    Gait, Gait Deviations forward flexed posture;left:;antalgic;nikita decreased;step length decreased  -RW forward flexed posture;left:;antalgic;nikita decreased;step length decreased  -RW forward flexed posture;left:;antalgic;nikita decreased;step length decreased  -RW    Gait, Safety Issues step length decreased  -RW step length decreased  -RW step length decreased  -RW    Recorded by [RW] Sarah Foster PTA [RW] Sarah Foster PTA [RW] Sarah Foster PTA    Therapy Exercises    Exercise Protocols total knee  -RW total knee  -RW total knee  -RW    Total Knee Exercises left:;25 reps;completed protocol  -RW left:;20 reps;completed protocol  -RW left:;15 reps;completed protocol  -RW    Recorded by [RW] Sarah Foster PTA [RW] Sarah Foster PTA [RW] Sarah Foster PTA    Positioning and Restraints    Pre-Treatment Position sitting in chair/recliner  -RW sitting in chair/recliner  -RW sitting in chair/recliner  -RW    Post Treatment Position chair  -RW other  -RW other  -RW    In Chair reclined;call light within reach;encouraged to call for assist;with family/caregiver  -RW      Other Position  return to room with caregiver   family pushed pt back to room in chair. Notified to call RN  -RW return to room with caregiver   family pushed pt back to room and notified to call RN  -RW    Recorded by [RW] Sarah Foster PTA [RW] Sarah Foster PTA [RW] Sarah Foster PTA      User Key  (r) = Recorded By, (t) = Taken By, (c) = Cosigned By    Initials Name Effective Dates    KELTON Foster PTA 04/06/16 -                 IP PT Goals        08/02/17 1438          Bed Mobility PT LTG    Bed Mobility PT LTG, Date Established 08/02/17  -MS      Bed Mobility PT LTG, Time to Achieve 3 days  -MS      Bed Mobility PT LTG, Activity Type all bed mobility  -MS      Bed Mobility PT LTG, [place] Level supervision required  -MS      Transfer Training PT LTG    Transfer Training PT LTG, Date Established 08/02/17  -MS      Transfer Training PT LTG, Time to Achieve 3 days  -MS      Transfer Training PT LTG, Activity Type all transfers  -MS      Transfer Training PT LTG, [place] Level supervision required  -MS      Transfer Training PT LTG, Assist Device walker, rolling  -MS      Gait Training PT LTG    Gait Training Goal PT LTG, Date Established 08/02/17  -MS      Gait Training Goal PT LTG, Time to Achieve 3 days  -MS      Gait Training Goal PT LTG, [place] Level supervision required  -MS      Gait Training Goal PT LTG, Assist Device walker, rolling  -MS      Gait Training Goal PT LTG, Distance to Achieve 100 feet  -MS      Range of Motion PT LTG    Range of Motion Goal PT LTG, Date Established 08/02/17  -MS      Range of Motion Goal PT LTG, Time to Achieve 3 days  -MS      Range fo Motion Goal PT LTG, Joint L knee  -MS      Range of Motion Goal PT LTG, AROM Measure (5, 90)  -MS        User Key  (r) = Recorded By, (t) = Taken By, (c) = Cosigned By    Initials Name Provider Type    MS Toul PETERS Ayah, PT Physical Therapist          Physical Therapy Education     Title: PT OT SLP Therapies (Resolved)     Topic: Physical Therapy (Resolved)     Point: Mobility training (Resolved)    Learning Progress Summary    Learner Readiness Method Response Comment Documented by Status   Patient Acceptance JAVIER MUNOZ D VU,NR  RW 08/04/17 1036 Done    Acceptance JAVIER MUNOZ D VU,NR  RW 08/03/17 1002 Done    Acceptance KRYSTYNA MUNOZNR  MS 08/02/17 1437 Done               Point: Home exercise program (Resolved)    Learning Progress Summary    Learner Readiness Method Response Comment  Documented by Status   Patient Acceptance E,TB,D VU,NR  RW 08/04/17 1036 Done    Acceptance E,TB,D VU,NR  RW 08/03/17 1002 Done    Acceptance E,D VU,NR  MS 08/02/17 1437 Done               Point: Body mechanics (Resolved)    Learning Progress Summary    Learner Readiness Method Response Comment Documented by Status   Patient Acceptance E,TB,D VU,NR  RW 08/04/17 1036 Done    Acceptance E,TB,D VU,NR  RW 08/03/17 1002 Done    Acceptance E,D VU,NR  MS 08/02/17 1437 Done               Point: Precautions (Resolved)    Learning Progress Summary    Learner Readiness Method Response Comment Documented by Status   Patient Acceptance E,TB,D VU,NR   08/04/17 1036 Done    Acceptance E,TB,D VU,NR   08/03/17 1002 Done    Acceptance E,D VU,NR  MS 08/02/17 1437 Done                      User Key     Initials Effective Dates Name Provider Type Discipline    MS 12/01/15 -  Tolu Poon, PT Physical Therapist PT     04/06/16 -  Sarah Foster, PTA Physical Therapy Assistant PT                    PT Recommendation and Plan  Anticipated Discharge Disposition: skilled nursing facility  Planned Therapy Interventions: balance training, bed mobility training, gait training, home exercise program, patient/family education, postural re-education, strengthening, transfer training, ROM (Range of Motion)  PT Frequency: 2 times/day  Plan of Care Review  Plan Of Care Reviewed With: patient, family  Outcome Summary/Follow up Plan: Pt increased ambulation distance and exercise tolerance. Verbal cueing to keep RW on floor and push forward during ambulation. Progressing well w/ PT. Possible d/c to SNF today or tomorrow.          Outcome Measures       08/04/17 0900 08/03/17 0900 08/02/17 1400    How much help from another person do you currently need...    Turning from your back to your side while in flat bed without using bedrails? 3  -RW 3  -RW 3  -MS    Moving from lying on back to sitting on the side of a flat bed without bedrails? 3   -RW 3  -RW 2  -MS    Moving to and from a bed to a chair (including a wheelchair)? 3  -RW 3  -RW 3  -MS    Standing up from a chair using your arms (e.g., wheelchair, bedside chair)? 3  -RW 3  -RW 3  -MS    Climbing 3-5 steps with a railing? 3  -RW 3  -RW 2  -MS    To walk in hospital room? 3  -RW 3  -RW 3  -MS    AM-PAC 6 Clicks Score 18  -RW 18  -RW 16  -MS    Functional Assessment    Outcome Measure Options AM-PAC 6 Clicks Basic Mobility (PT)  -RW AM-PAC 6 Clicks Basic Mobility (PT)  -RW AM-PAC 6 Clicks Basic Mobility (PT)  -MS      User Key  (r) = Recorded By, (t) = Taken By, (c) = Cosigned By    Initials Name Provider Type    MS Tolu Poon, PT Physical Therapist    RW Sarah Foster PTA Physical Therapy Assistant           Time Calculation:         PT Charges       08/04/17 1026          Time Calculation    Start Time 0835  -RW      Stop Time 0915  -RW      Time Calculation (min) 40 min  -RW      PT Received On 08/04/17  -RW      PT - Next Appointment 08/04/17  -        User Key  (r) = Recorded By, (t) = Taken By, (c) = Cosigned By    Initials Name Provider Type     Sarah Foster PTA Physical Therapy Assistant          Therapy Charges for Today     Code Description Service Date Service Provider Modifiers Qty    94304814372 HC PT THER PROC EA 15 MIN 8/3/2017 Sarah Foster PTA GP 1    14258992635 HC PT THER PROC GROUP 8/3/2017 Sarah Foster PTA GP 1    96010068840 HC PT THER PROC EA 15 MIN 8/3/2017 Sarah Foster PTA GP 1    70834202958 HC PT THER PROC GROUP 8/3/2017 Sarah Foster PTA GP 1    57340065650 HC PT THER PROC EA 15 MIN 8/4/2017 Sarah Foster PTA GP 1    46973609995 HC PT THER PROC GROUP 8/4/2017 Sarah Foster PTA GP 1          PT G-Codes  Outcome Measure Options: AM-PAC 6 Clicks Basic Mobility (PT)    Sarah Foster PTA  8/4/2017

## 2017-08-04 NOTE — PROGRESS NOTES
Orthopedic Total Joint Progress Note        Patient: Loulou Collier    Date of Admission: 8/2/2017  7:10 AM    YOB: 1940    Medical Record Number: [id number]    Attending Physician: Rui Craft MD      POD # 2 Days Post-Op Procedure(s) (LRB):  TOTAL KNEE ARTHROPLASTY WITH NITIN NAVIGATION (Left)       Systemic or Specific Complaints: No Complaints      Allergies:   Allergies   Allergen Reactions   • Cephalosporins Hives   • Penicillins Hives       Medications:   Current Medications:  Scheduled Meds:  aspirin 325 mg Oral BID   atorvastatin 20 mg Oral Daily   docusate sodium 100 mg Oral BID   hydrochlorothiazide 12.5 mg Oral QAM   metoprolol succinate XL 50 mg Oral QAM   mupirocin  Nasal BID   polyethylene glycol 17 g Oral BID   tranexamic acid (CYLOKAPRON) 1500 mg in 0.9 NS for irrigation 1,500 mg Topical Once   tranexamic acid (CYLOKAPRON) 1500 mg in 0.9 NS for irrigation 1,500 mg Topical Once   vancomycin 15 mg/kg Intravenous Once     Continuous Infusions:  HYDROmorphone HCl-NaCl      PRN Meds:.•  acetaminophen  •  bisacodyl  •  bisacodyl  •  diphenhydrAMINE  •  diphenhydrAMINE  •  magnesium hydroxide  •  naloxone  •  ondansetron **OR** ondansetron ODT **OR** ondansetron  •  oxyCODONE-acetaminophen  •  oxyCODONE-acetaminophen  •  promethazine      Physical Exam: 77 y.o. female Body mass index is 36.48 kg/(m^2).  General Appearance:    alert and oriented            Pain Relief: Patient reports good relief Vitals:    08/03/17 2020 08/04/17 0005 08/04/17 0300 08/04/17 0731   BP: 120/68 117/74 116/71 113/60   BP Location: Left arm Left arm Left arm Left arm   Patient Position: Sitting Lying Lying Lying   Pulse: 85 106 108 118   Resp: 16 16 16 18   Temp: 99.2 °F (37.3 °C) 98.1 °F (36.7 °C) 98.6 °F (37 °C) 97.6 °F (36.4 °C)   TempSrc: Oral Oral Oral Oral   SpO2: 94% 94% 91% 98%   Weight:       Height:              HEENT:    Normocephalic, without obvious abnormality, atraumatic.          PERRLA;  EOMI; Neck supple with trachea midline. No JVD.       Lungs:     Respirations regular, even and unlabored      Heart:    Regular rhythm and normal rate.   Abdomen:     Normal bowel sounds, soft non-tender, non-distended       Extremities:   Operative extremity neurovascular status intact. ROM intact.    Incision intact w/out signs or symptoms of infection.  No cyanosis, no calf tenderness     Pulses:     Pulses palpable and equal bilaterally     Skin:     Skin Warm/Dry w/out cyanosis     Activity: Mobilizing Per P.T.   Weight Bearing: As Tolerated    Diagnostic Tests:   Admission on 08/02/2017   Component Date Value Ref Range Status   • Glucose 08/03/2017 125* 65 - 99 mg/dL Final   • BUN 08/03/2017 14  8 - 23 mg/dL Final   • Creatinine 08/03/2017 0.79  0.57 - 1.00 mg/dL Final   • Sodium 08/03/2017 143  136 - 145 mmol/L Final   • Potassium 08/03/2017 4.5  3.5 - 5.2 mmol/L Final   • Chloride 08/03/2017 105  98 - 107 mmol/L Final   • CO2 08/03/2017 25.9  22.0 - 29.0 mmol/L Final   • Calcium 08/03/2017 8.4* 8.6 - 10.5 mg/dL Final   • eGFR   Amer 08/03/2017 86  >60 mL/min/1.73 Final   • BUN/Creatinine Ratio 08/03/2017 17.7  7.0 - 25.0 Final   • Anion Gap 08/03/2017 12.1  mmol/L Final   • Hemoglobin 08/03/2017 11.5* 11.9 - 15.5 g/dL Final   • Hematocrit 08/03/2017 36.3  35.6 - 45.5 % Final   • Hemoglobin 08/04/2017 10.8* 11.9 - 15.5 g/dL Final   • Hematocrit 08/04/2017 34.4* 35.6 - 45.5 % Final       Xr Knee 1 Or 2 View Left    Result Date: 8/2/2017  Narrative: XR KNEE 1 OR 2 VW LEFT-  POSTOP PORTABLE 2 VIEWS LEFT KNEE  CLINICAL INFORMATION: Post arthroplasty  FINDINGS: Prosthesis is satisfactory in position. A complicating process is not identified.  This report was finalized on 8/2/2017 11:46 AM by Dr. Josue Farah MD.        Personally viewed Yes and previously    Assessment:  Doing well POD  # 2 Days Post-Op following total joint replacement  Acute Blood Loss Anemia, stable  Post-operative Pain  Limited  mobility, requires use of walker and assistance when OOB.    Patient Active Problem List   Diagnosis   • Elevated cholesterol   • Benign essential HTN   • H/O hysterectomy for benign disease   • Status post total knee replacement, right   • Primary osteoarthritis of knee   • Arthritis of left knee   • Chronic pain of left knee        Plan:  Continue to monitor labs and/or v/s, for tolerance to post op blood loss.  Continue efforts to increase mobilization.  Continue Pain Control Measures.  Continue incisional Care.  DVT prophylaxis.  Follow up in office with Rui Craft M.D. In 2 weeks.    Discharge Plan:POD 2-3 to [facility] when precert clears.    Date: 8/4/2017  Natty Howard, APRN

## 2017-08-04 NOTE — PLAN OF CARE
Problem: Patient Care Overview (Adult)  Goal: Plan of Care Review  Outcome: Ongoing (interventions implemented as appropriate)    08/04/17 1036   Coping/Psychosocial Response Interventions   Plan Of Care Reviewed With patient;family   Outcome Evaluation   Outcome Summary/Follow up Plan Pt increased ambulation distance and exercise tolerance. Verbal cueing to keep RW on floor and push forward during ambulation. Progressing well w/ PT. Possible d/c to SNF today or tomorrow.

## 2017-08-04 NOTE — THERAPY TREATMENT NOTE
Acute Care - Physical Therapy Treatment Note  [facility]     Patient Name: Loulou Collier  : 1940  MRN: [id number]  Today's Date: 2017  Onset of Illness/Injury or Date of Surgery Date: 17  Date of Referral to PT: 17  Referring Physician: Rui Diaz    Admit Date: 2017    Visit Dx:    ICD-10-CM ICD-9-CM   1. Arthritis of left knee M19.90 716.96     Patient Active Problem List   Diagnosis   • Elevated cholesterol   • Benign essential HTN   • H/O hysterectomy for benign disease   • Status post total knee replacement, right   • Primary osteoarthritis of knee   • Arthritis of left knee   • Chronic pain of left knee               Adult Rehabilitation Note       17 0900 17 1300 17 09    Rehab Assessment/Intervention    Discipline physical therapy assistant  -RW physical therapy assistant  -RW physical therapy assistant  -RW    Document Type therapy note (daily note)  -RW therapy note (daily note)  -RW therapy note (daily note)  -RW    Subjective Information agree to therapy  -RW agree to therapy  -RW agree to therapy  -RW    Patient Effort, Rehab Treatment good  -RW good  -RW good  -RW    Precautions/Limitations fall precautions  -RW fall precautions  -RW fall precautions  -RW    Recorded by [RW] Sarah Foster, BABAK [RW] Sarah Foster, BABAK [RW] Sarah Foster PTA    Pain Assessment    Pain Assessment 0-10  -RW 0-10  -RW 0-10  -RW    Pain Score 6  -RW 7  -RW 6  -RW    Pain Type Acute pain;Surgical pain  -RW Acute pain;Surgical pain  -RW Acute pain;Surgical pain  -RW    Pain Location Knee  -RW Knee  -RW Knee  -RW    Pain Orientation Left  -RW Left  -RW Left  -RW    Pain Intervention(s) Medication (See MAR);Repositioned;Ambulation/increased activity  -RW Medication (See MAR);Repositioned;Ambulation/increased activity  -RW Medication (See MAR);Repositioned;Ambulation/increased activity  -RW    Response to Interventions tolerated  -RW tolerated  -RW tolerated  -RW     Recorded by [RW] Sarah Foster PTA [RW] Sarah Foster, BABAK [RW] Sarah Foster PTA    Cognitive Assessment/Intervention    Current Cognitive/Communication Assessment functional  -RW functional  -RW functional  -RW    Orientation Status oriented x 4  -RW oriented x 4  -RW oriented x 4  -RW    Follows Commands/Answers Questions 100% of the time  -% of the time  -% of the time  -RW    Personal Safety WNL/WFL  -RW WNL/WFL  -RW WNL/WFL  -RW    Personal Safety Interventions fall prevention program maintained;gait belt;nonskid shoes/slippers when out of bed  -RW fall prevention program maintained;gait belt;nonskid shoes/slippers when out of bed  -RW fall prevention program maintained;gait belt;nonskid shoes/slippers when out of bed  -RW    Recorded by [RW] Sarah Foster PTA [RW] Sarah Foster, BABAK [RW] Sarah Foster PTA    ROM (Range of Motion)    General ROM Detail L knee 13-74'  -RW  L knee 13-67'  -RW    Recorded by [RW] Sarah Foster, BABAK  [RW] Sarah Foster PTA    Bed Mobility, Assessment/Treatment    Bed Mob, Supine to Sit, [place] not tested  -RW not tested  -RW not tested  -RW    Bed Mob, Sit to Supine, [place] not tested  -RW not tested  -RW not tested  -RW    Bed Mobility, Comment Pt up in chair  -RW Pt up in chair  -RW     Recorded by [RW] Sarah Foster PTA [RW] Sarah Foster, PTA [RW] Sarah Foster PTA    Transfer Assessment/Treatment    Transfers, Sit-Stand [place] stand by assist  -RW stand by assist;verbal cues required  -RW stand by assist;verbal cues required  -RW    Transfers, Stand-Sit [place] stand by assist  -RW stand by assist  -RW stand by assist  -RW    Transfers, Sit-Stand-Sit, Assist Device rolling walker  -RW rolling walker  -RW rolling walker  -RW    Recorded by [RW] Sarah Foster, BABAK [RW] Sarah Foster, PTA [RW] Sarah Foster PTA    Gait Assessment/Treatment    Gait, [place] Level stand by assist;contact guard  assist;verbal cues required  -RW stand by assist;contact guard assist;verbal cues required  -RW contact guard assist;verbal cues required  -RW    Gait, Assistive Device rolling walker  -RW rolling walker  -RW rolling walker  -RW    Gait, Distance (Feet) 75  -RW 35  -RW 45  -RW    Gait, Gait Pattern Analysis swing-to gait  -RW swing-to gait  -RW swing-to gait  -RW    Gait, Gait Deviations forward flexed posture;left:;antalgic;nikita decreased;step length decreased  -RW forward flexed posture;left:;antalgic;nik[place] decreased;step length decreased  -RW forward flexed posture;left:;antalgic;nikita decreased;step length decreased  -RW    Gait, Safety Issues step length decreased  -RW step length decreased  -RW step length decreased  -RW    Recorded by [RW] Sarah Foster PTA [RW] Sarah Foster PTA [RW] Sarah Foster PTA    Therapy Exercises    Exercise Protocols total knee  -RW total knee  -RW total knee  -RW    Total Knee Exercises left:;25 reps;completed protocol  -RW left:;20 reps;completed protocol  -RW left:;15 reps;completed protocol  -RW    Recorded by [RW] Sarah Foster PTA [RW] Sarah Foster PTA [RW] Sarah Foster PTA    Positioning and Restraints    Pre-Treatment Position sitting in chair/recliner  -RW sitting in chair/recliner  -RW sitting in chair/recliner  -RW    Post Treatment Position chair  -RW other  -RW other  -RW    In Chair reclined;call light within reach;encouraged to call for assist;with family/caregiver  -RW      Other Position  return to room with caregiver   family pushed pt back to room in chair. Notified to call RN  -RW return to room with caregiver   family pushed pt back to room and notified to call RN  -RW    Recorded by [RW] Sarah Foster PTA [RW] Sarah Foster PTA [RW] Sarah Foster PTA      User Key  (r) = Recorded By, (t) = Taken By, (c) = Cosigned By    Initials Name Effective Dates    KELTON Foster PTA 04/06/16 -                 IP PT Goals        08/02/17 1438          Bed Mobility PT LTG    Bed Mobility PT LTG, Date Established 08/02/17  -MS      Bed Mobility PT LTG, Time to Achieve 3 days  -MS      Bed Mobility PT LTG, Activity Type all bed mobility  -MS      Bed Mobility PT LTG, [place] Level supervision required  -MS      Transfer Training PT LTG    Transfer Training PT LTG, Date Established 08/02/17  -MS      Transfer Training PT LTG, Time to Achieve 3 days  -MS      Transfer Training PT LTG, Activity Type all transfers  -MS      Transfer Training PT LTG, [place] Level supervision required  -MS      Transfer Training PT LTG, Assist Device walker, rolling  -MS      Gait Training PT LTG    Gait Training Goal PT LTG, Date Established 08/02/17  -MS      Gait Training Goal PT LTG, Time to Achieve 3 days  -MS      Gait Training Goal PT LTG, [place] Level supervision required  -MS      Gait Training Goal PT LTG, Assist Device walker, rolling  -MS      Gait Training Goal PT LTG, Distance to Achieve 100 feet  -MS      Range of Motion PT LTG    Range of Motion Goal PT LTG, Date Established 08/02/17  -MS      Range of Motion Goal PT LTG, Time to Achieve 3 days  -MS      Range fo Motion Goal PT LTG, Joint L knee  -MS      Range of Motion Goal PT LTG, AROM Measure (5, 90)  -MS        User Key  (r) = Recorded By, (t) = Taken By, (c) = Cosigned By    Initials Name Provider Type    MS Tolu PETERS Ayah, PT Physical Therapist          Physical Therapy Education     Title: PT OT SLP Therapies (Done)     Topic: Physical Therapy (Done)     Point: Mobility training (Done)    Learning Progress Summary    Learner Readiness Method Response Comment Documented by Status   Patient Acceptance JAVIER MUNOZ D VU,NR  RW 08/04/17 1036 Done    Acceptance JAVIER MUNOZ D VU,NR  RW 08/03/17 1002 Done    Acceptance KRYSTYNA MUNOZNR  MS 08/02/17 1437 Done               Point: Home exercise program (Done)    Learning Progress Summary    Learner Readiness Method Response Comment Documented by Status    Patient Acceptance E,TB,D VU,NR  RW 08/04/17 1036 Done    Acceptance E,TB,D VU,NR  RW 08/03/17 1002 Done    Acceptance E,D VU,NR  MS 08/02/17 1437 Done               Point: Body mechanics (Done)    Learning Progress Summary    Learner Readiness Method Response Comment Documented by Status   Patient Acceptance E,TB,D VU,NR  RW 08/04/17 1036 Done    Acceptance E,TB,D VU,NR   08/03/17 1002 Done    Acceptance E,D VU,NR  MS 08/02/17 1437 Done               Point: Precautions (Done)    Learning Progress Summary    Learner Readiness Method Response Comment Documented by Status   Patient Acceptance E,TB,D VU,NR   08/04/17 1036 Done    Acceptance E,TB,D VU,NR   08/03/17 1002 Done    Acceptance E,D VU,NR  MS 08/02/17 1437 Done                      User Key     Initials Effective Dates Name Provider Type Discipline    MS 12/01/15 -  Tolu Poon, PT Physical Therapist PT     04/06/16 -  Sarah Foster, PTA Physical Therapy Assistant PT                    PT Recommendation and Plan  Anticipated Discharge Disposition: skilled nursing facility  Planned Therapy Interventions: balance training, bed mobility training, gait training, home exercise program, patient/family education, postural re-education, strengthening, transfer training, ROM (Range of Motion)  PT Frequency: 2 times/day  Plan of Care Review  Plan Of Care Reviewed With: patient, family  Outcome Summary/Follow up Plan: Pt increased ambulation distance and exercise tolerance. Verbal cueing to keep RW on floor and push forward during ambulation. Progressing well w/ PT. Possible d/c to SNF today or tomorrow.          Outcome Measures       08/04/17 0900 08/03/17 0900 08/02/17 1400    How much help from another person do you currently need...    Turning from your back to your side while in flat bed without using bedrails? 3  -RW 3  -RW 3  -MS    Moving from lying on back to sitting on the side of a flat bed without bedrails? 3  -RW 3  -RW 2  -MS    Moving to  and from a bed to a chair (including a wheelchair)? 3  -RW 3  -RW 3  -MS    Standing up from a chair using your arms (e.g., wheelchair, bedside chair)? 3  -RW 3  -RW 3  -MS    Climbing 3-5 steps with a railing? 3  -RW 3  -RW 2  -MS    To walk in hospital room? 3  -RW 3  -RW 3  -MS    AM-PAC 6 Clicks Score 18  -RW 18  -RW 16  -MS    Functional Assessment    Outcome Measure Options AM-PAC 6 Clicks Basic Mobility (PT)  -RW AM-PAC 6 Clicks Basic Mobility (PT)  -RW AM-PAC 6 Clicks Basic Mobility (PT)  -MS      User Key  (r) = Recorded By, (t) = Taken By, (c) = Cosigned By    Initials Name Provider Type    MS Tolu Poon, PT Physical Therapist    RW Sarah Foster PTA Physical Therapy Assistant           Time Calculation:         PT Charges       08/04/17 1026          Time Calculation    Start Time 0835  -RW      Stop Time 0915  -RW      Time Calculation (min) 40 min  -RW      PT Received On 08/04/17  -RW      PT - Next Appointment 08/04/17  -RW        User Key  (r) = Recorded By, (t) = Taken By, (c) = Cosigned By    Initials Name Provider Type    RW Sarah Foster PTA Physical Therapy Assistant          Therapy Charges for Today     Code Description Service Date Service Provider Modifiers Qty    33802872827 HC PT THER PROC EA 15 MIN 8/3/2017 Sarah Fotser PTA GP 1    93754327258 HC PT THER PROC GROUP 8/3/2017 Sarah Foster PTA GP 1    77275250681 HC PT THER PROC EA 15 MIN 8/3/2017 Sarah Foster PTA GP 1    13579036776 HC PT THER PROC GROUP 8/3/2017 Sarah Foster PTA GP 1    28746285165 HC PT THER PROC EA 15 MIN 8/4/2017 Sarah Foster PTA GP 1    64324076159 HC PT THER PROC GROUP 8/4/2017 Sarah Foster PTA GP 1          PT G-Codes  Outcome Measure Options: AM-PAC 6 Clicks Basic Mobility (PT)    Sarah Foster PTA  8/4/2017

## 2017-08-08 ENCOUNTER — TRANSCRIBE ORDERS (OUTPATIENT)
Dept: ADMINISTRATIVE | Facility: HOSPITAL | Age: 77
End: 2017-08-08

## 2017-08-08 DIAGNOSIS — Z01.818 PRE-OP TESTING: Primary | ICD-10-CM

## 2017-08-16 ENCOUNTER — OFFICE VISIT (OUTPATIENT)
Dept: ORTHOPEDIC SURGERY | Facility: CLINIC | Age: 77
End: 2017-08-16

## 2017-08-16 VITALS — TEMPERATURE: 98.1 F | WEIGHT: 201 LBS | HEIGHT: 63 IN | BODY MASS INDEX: 35.61 KG/M2

## 2017-08-16 DIAGNOSIS — Z96.652 HISTORY OF KNEE REPLACEMENT, TOTAL, LEFT: Primary | ICD-10-CM

## 2017-08-16 PROBLEM — Z96.659 HISTORY OF KNEE REPLACEMENT, TOTAL: Status: ACTIVE | Noted: 2017-08-16

## 2017-08-16 PROCEDURE — 99024 POSTOP FOLLOW-UP VISIT: CPT | Performed by: NURSE PRACTITIONER

## 2017-08-16 PROCEDURE — 73560 X-RAY EXAM OF KNEE 1 OR 2: CPT | Performed by: NURSE PRACTITIONER

## 2017-08-16 RX ORDER — OXYCODONE AND ACETAMINOPHEN 7.5; 325 MG/1; MG/1
TABLET ORAL
Qty: 100 TABLET | Refills: 0 | Status: SHIPPED | OUTPATIENT
Start: 2017-08-16 | End: 2017-08-16 | Stop reason: ALTCHOICE

## 2017-08-16 RX ORDER — OXYCODONE HYDROCHLORIDE AND ACETAMINOPHEN 5; 325 MG/1; MG/1
TABLET ORAL
Qty: 100 TABLET | Refills: 0 | Status: SHIPPED | OUTPATIENT
Start: 2017-08-16 | End: 2017-09-19 | Stop reason: SDUPTHER

## 2017-08-16 NOTE — PATIENT INSTRUCTIONS
ASSESSMENT: 2 week status post left knee replacement expected healing.    PLAN: 1) Staples removed and steri strips applied   2) Order given for PT and pain medicine (as needed)   3) Continue KEEGAN hose for four weeks   4) Continue ice PRN   5) Continue EC Aspirin 325mg by mouth twice a day until 6 weeks post op.   6) Follow up in 4 weeks with repeat Xrays of left knee (2 views)   7) Continue with antibiotic prophylaxis with dental procedures for 2 yrs post total joint replacement.  Discussed travel precautions to wear support hose and do ankle pumps and walk frequently at least every 2 hours.     Natty Howard, APRN  8/16/2017     Pain Medications utilized after surgery are narcotics and the law requires that the following information be given to all patients that are prescribed narcotics:    CLASSIFICATION: Pain medications are called Opioids and are narcotics  LEGALITIES: It is illegal to share narcotics with others and to drive within 4 hours of taking narcotics  POTENTIAL SIDE EFFECTS: Potential side effects of opioids include: nausea, vomiting, itching, dizziness, drowsiness, dry mouth, constipation, and difficulty urinating.  POTENTIAL ADVERSE EFFECTS:   Opioid tolerance can develop with use of pain medications and this simply means that it requires more and more of the medication to control pain; however, this is seen more in patients that use opioids for longer periods of time.  Opioid dependence can develop with use of Opioids and this simply means that to stop the medication can cause withdrawal symptoms so wean gradually (do not stop all at once); however, this is seen more with patients that use opioids for longer periods of time.  Opioid addiction can develop with use of Opioids and the incidence of this is very unlikely in patients who take the medications as ordered and stop the medications as instructed.  Opioid overdose can be dangerous, but is unlikely when the medication is taken as ordered and  stopped when ordered. It is important not to mix opioids with alcohol or with and type of sedative such as Benadryl as this can lead to over sedation and respiratory difficulty.    DOSAGE:   Pain medications will need to be taken consistently for the first week to decrease pain and promote adequate pain relief and participation in physical therapy.    After the initial surgical pain begins to resolve, you may begin to decrease the pain medication. By the end of 8 weeks, you should be off of pain medications.    Refills will not be given by the office during evening hours, on weekends.  To seek refills on pain medications during the initial 6 week post-operative period, you must call the office 48 hours in advance to request the refill. The office will then notify you when to  the prescription. DO NOT wait until you are out of the medication to request a refill.  [facility]    KNEE HOME EXERCISES      It is important that you maintain and possibly improve your strength and range of motion of your knee.      •  Walk frequently for short intervals  •  Using a cane or walker to allow you to walk safely if needed  •  Avoid sitting for long periods of time to avoid cramping and swelling of your leg   •  Do exercises either on a bed or in a chair.  •  If any of the exercises cause you pain, either eliminate that exercise or decrease          the motion or repetitions      Start exercises with 10 repetitions and increase to 30 repetitions.  Do two sets of each exercise each day    ANKLE PUMPS    1. Move your feet up and down as if you are pumping on a gas pedal       STRAIGHT LEG RAISES    1. Lie flat with your injured leg straight.  Keep the other leg bent.  2. Tighten the injured leg's thigh muscle.  3. Lift leg, with knee locked in the straight position no higher than the other knee for  seconds  4. Lower leg slowly. Rest for 5 seconds      SHORT ARC QUAD    1. Lie with both knees bent  over a pillow  2. Straighten both knees  3. Hold 5 seconds  4. Bend knees back to starting position and repeat sequence       QUADRICEPS     1. Lie flat with your injured let straight.  Keep the other leg bent.  2. Tighten the injured leg's thigh muscle by trying to move your kneecap toward the  thigh.  3. Make your leg as stiff as you can.  4. Hold for 5 seconds and relax for 5 seconds        HAMSTRING STRETCH    1. Lie flat with your injured leg straight. Keep the other leg bent  2. Press your heel of your injured leg into the floor for 5 seconds       OR  1. Sit with injured leg out straight.   2. Loop a sheet around the ball of foot and toes.  3. Gently pull on the sheet, keeping the leg straight  4. Hold for 10-30 seconds      KNEE FLEXION-EXTENSION SITTING     1. Sit with injured let bent as shown  2. Straighten leg at the knee  3. Hold 5 seconds  4. Bend knee back to starting position   5. Rest for 2-5 seconds and repeat sequence       HEEL SLIDES     1. Lie on back with legs straight  2. Slide heels toward buttocks  3. Return to starting position       HEEL SLIDS WITH SHEET    1. Lie on your back with a sheet wrapped around your foot  2. Pull on the sheet to assist you in bending your knee and sliding your heel toward  your buttocks  3. Hold for 5 seconds        KNEE FLEXION STRETCH    1. Sit in a chair  2. Bend bad knee back as far as you can   3. Hold stretch for 5-10 seconds      CHAIR PUSH UPS    1. Sit in a chair with arm rests  2. Place hands on armrests  3. Straighten arms, raising buttocks up off of chair         WALL SLIDES    1. Stand with your back and head against a wall.    2. Keep your feet shoulder width apart and 6-12 inches from the wall  3. Slowly slide down the wall until your knees are slightly bent.    4. Hold for 5 seconds and then slowly slide back up  5. As you get stronger, then you can slowly increase the bend in your knees, but do  not drop your buttocks below the level of your  knees       STEP UPS    1. Stand with your foot on your injured leg on a 3-5 inch support (such as a block of  wood)  2. Place other foot on the floor  3. Shift your weight onto the foot on the block and try to straighten the knee and  raising the other foot off of the floor  4. Slowly lower your foot until only the heel touches the floor

## 2017-08-16 NOTE — PROGRESS NOTES
Loulou Collier : 1940 MRN: [id number] DATE: 2017  Body mass index is 36.18 kg/(m^2).  Vitals:    17 1424   Temp: 98.1 °F (36.7 °C)       DIAGNOSIS: 2 week follow up left total knee arthroplasty    SUBJECTIVE:Patient returns today for 2 week follow up of left total knee replacement. Patient reports doing well with no unusual complaints. Appears to be progressing appropriately.    OBJECTIVE:   Exam:. The incision is healing appropriately. No sign of infection. Range of motion is progressing as expected -3/90. The calf is soft and nontender with a negative Homans sign.    DIAGNOSTIC STUDIES  Xrays: 2 views of the left knee (AP full length and lateral) were ordered and reviewed for evaluation of recent knee replacement. They demonstrate a well positioned, well aligned knee replacement without complicating factors noted. In comparison with previous films there has been interval implant placement.    ASSESSMENT: 2 week status post left knee replacement expected healing.    PLAN: 1) Staples removed and steri strips applied   2) Order given for PT and pain medicine (as needed)   3) Continue KEEGAN hose for four weeks   4) Continue ice PRN   5) Continue EC Aspirin 325mg by mouth twice a day until 6 weeks post op.   6) Follow up in 4 weeks with repeat Xrays of left knee (2 views)   7) Continue with antibiotic prophylaxis with dental procedures for 2 yrs post total joint replacement.  Discussed travel precautions to wear support hose and do ankle pumps and walk frequently at least every 2 hours.     Natty Howard, APRN  2017     Pain Medications utilized after surgery are narcotics and the law requires that the following information be given to all patients that are prescribed narcotics:    CLASSIFICATION: Pain medications are called Opioids and are narcotics  LEGALITIES: It is illegal to share narcotics with others and to drive within 4 hours of taking narcotics  POTENTIAL SIDE EFFECTS: Potential side  effects of opioids include: nausea, vomiting, itching, dizziness, drowsiness, dry mouth, constipation, and difficulty urinating.  POTENTIAL ADVERSE EFFECTS:   Opioid tolerance can develop with use of pain medications and this simply means that it requires more and more of the medication to control pain; however, this is seen more in patients that use opioids for longer periods of time.  Opioid dependence can develop with use of Opioids and this simply means that to stop the medication can cause withdrawal symptoms so wean gradually (do not stop all at once); however, this is seen more with patients that use opioids for longer periods of time.  Opioid addiction can develop with use of Opioids and the incidence of this is very unlikely in patients who take the medications as ordered and stop the medications as instructed.  Opioid overdose can be dangerous, but is unlikely when the medication is taken as ordered and stopped when ordered. It is important not to mix opioids with alcohol or with and type of sedative such as Benadryl as this can lead to over sedation and respiratory difficulty.    DOSAGE:   Pain medications will need to be taken consistently for the first week to decrease pain and promote adequate pain relief and participation in physical therapy.    After the initial surgical pain begins to resolve, you may begin to decrease the pain medication. By the end of 8 weeks, you should be off of pain medications.    Refills will not be given by the office during evening hours, on weekends.  To seek refills on pain medications during the initial 6 week post-operative period, you must call the office 48 hours in advance to request the refill. The office will then notify you when to  the prescription. DO NOT wait until you are out of the medication to request a refill.

## 2017-08-28 ENCOUNTER — OFFICE VISIT (OUTPATIENT)
Dept: INTERNAL MEDICINE | Facility: CLINIC | Age: 77
End: 2017-08-28

## 2017-08-28 VITALS
SYSTOLIC BLOOD PRESSURE: 104 MMHG | HEIGHT: 63 IN | HEART RATE: 72 BPM | BODY MASS INDEX: 36.14 KG/M2 | WEIGHT: 204 LBS | DIASTOLIC BLOOD PRESSURE: 64 MMHG

## 2017-08-28 DIAGNOSIS — I10 BENIGN ESSENTIAL HTN: ICD-10-CM

## 2017-08-28 DIAGNOSIS — M17.12 PRIMARY OSTEOARTHRITIS OF LEFT KNEE: Primary | ICD-10-CM

## 2017-08-28 PROBLEM — M25.562 CHRONIC PAIN OF LEFT KNEE: Status: RESOLVED | Noted: 2017-08-02 | Resolved: 2017-08-28

## 2017-08-28 PROBLEM — Z96.659 HISTORY OF KNEE REPLACEMENT, TOTAL: Status: RESOLVED | Noted: 2017-08-16 | Resolved: 2017-08-28

## 2017-08-28 PROBLEM — Z96.651 STATUS POST TOTAL KNEE REPLACEMENT, RIGHT: Status: RESOLVED | Noted: 2017-03-23 | Resolved: 2017-08-28

## 2017-08-28 PROBLEM — G89.29 CHRONIC PAIN OF LEFT KNEE: Status: RESOLVED | Noted: 2017-08-02 | Resolved: 2017-08-28

## 2017-08-28 PROCEDURE — 99213 OFFICE O/P EST LOW 20 MIN: CPT | Performed by: INTERNAL MEDICINE

## 2017-08-28 NOTE — PROGRESS NOTES
Subjective   Loulou Collier is a 77 y.o. female.     History of Present Illness she is here today for follow-up of hypertension having had left total knee replacement 3 weeks ago.  She really has done quite well.  She was in rehabilitation for a while and now she is at home receiving physical therapy as an outpatient 3 days per week.  She is able to walk without a cane most of the time.  Pain has been fairly well-controlled.  She denies any nausea.  Her blood pressure has been running somewhat lower than usual during this time.  She denies any dizziness or focal neurologic symptoms.  She has not had any shortness of breath or chest pain.  She denies PND, nausea and vomiting, melanotic stool, or rectal bleeding.  She will be traveling to [facility] next week.        Review of Systems   Constitutional: Negative for activity change, appetite change, fatigue and fever.   Respiratory: Negative for cough, chest tightness, shortness of breath and wheezing.    Cardiovascular: Negative for chest pain, palpitations and leg swelling.   Gastrointestinal: Negative for abdominal pain, diarrhea, nausea and vomiting.   Genitourinary: Negative for flank pain and hematuria.   Musculoskeletal: Negative for arthralgias, back pain and gait problem.   Neurological: Negative for dizziness and weakness.       Objective   Physical Exam   Constitutional: She is oriented to person, place, and time. Vital signs are normal. She appears well-developed and well-nourished. She is active. She does not appear ill.   Eyes: Conjunctivae are normal.   Neck: Carotid bruit is not present.   Cardiovascular: Normal rate, regular rhythm, S1 normal and S2 normal.  Exam reveals no S3 and no S4.    No murmur heard.  Pulmonary/Chest: No tachypnea. No respiratory distress. She has no decreased breath sounds. She has no wheezes. She has no rhonchi. She has no rales.   Abdominal: Soft. Normal appearance and bowel sounds are normal. She exhibits no abdominal  bruit and no mass. There is no hepatosplenomegaly. There is no tenderness.       Vascular Status -  Her exam exhibits no right foot edema. Her exam exhibits no left foot edema.  Neurological: She is alert and oriented to person, place, and time. Gait abnormal.   Only mild slowing and mild limp favoring the left leg   Psychiatric: She has a normal mood and affect. Her speech is normal and behavior is normal. Judgment and thought content normal. Cognition and memory are normal.       Assessment/Plan blood pressure by me 119/69    Assessment #1 osteoarthritis-she is doing very well status post left total knee replacement #2 hypertension-pressure a little low since surgery    Plan #1 discontinue metoprolol #2 continue hydrochlorothiazide 12.5 mg by mouth daily #3 she is to walk half way through each leg of her flight to and from [place].  Routine follow-up with me in 3 months.

## 2017-09-19 ENCOUNTER — OFFICE VISIT (OUTPATIENT)
Dept: ORTHOPEDIC SURGERY | Facility: CLINIC | Age: 77
End: 2017-09-19

## 2017-09-19 VITALS — HEIGHT: 63 IN | TEMPERATURE: 97.8 F | WEIGHT: 202 LBS | BODY MASS INDEX: 35.79 KG/M2

## 2017-09-19 DIAGNOSIS — Z96.652 STATUS POST TOTAL LEFT KNEE REPLACEMENT: Primary | ICD-10-CM

## 2017-09-19 DIAGNOSIS — Z96.652 HISTORY OF KNEE REPLACEMENT, TOTAL, LEFT: ICD-10-CM

## 2017-09-19 PROBLEM — Z96.659 HISTORY OF KNEE REPLACEMENT, TOTAL: Status: ACTIVE | Noted: 2017-09-19

## 2017-09-19 PROCEDURE — 73560 X-RAY EXAM OF KNEE 1 OR 2: CPT | Performed by: NURSE PRACTITIONER

## 2017-09-19 PROCEDURE — 99024 POSTOP FOLLOW-UP VISIT: CPT | Performed by: NURSE PRACTITIONER

## 2017-09-19 RX ORDER — HYDROCHLOROTHIAZIDE 12.5 MG/1
CAPSULE, GELATIN COATED ORAL
Refills: 3 | COMMUNITY
Start: 2017-08-21 | End: 2018-10-05 | Stop reason: SDUPTHER

## 2017-09-19 RX ORDER — OXYCODONE HYDROCHLORIDE AND ACETAMINOPHEN 5; 325 MG/1; MG/1
TABLET ORAL
Qty: 30 TABLET | Refills: 0 | Status: SHIPPED | OUTPATIENT
Start: 2017-09-19 | End: 2017-12-15

## 2017-09-19 NOTE — PATIENT INSTRUCTIONS
ASSESSMENT: status post left total knee replacement expected healing.    PLAN: 1) Continue with PT exercises as prescribed   2) Follow up 3 MONTHS  WITH XRAYS (2 views of same joint).   3) Continue with antibiotic prophylaxis with dental procedures for 2 yrs post total joint replacement.   4) May discontinue Aspirin therapy from surgery at this time and resume medications, vitamins, and supplements you were taking before surgery. You may also restart your meloxicam at this time.     Natty Howard, APRN  9/19/2017     Pain Medications utilized after surgery are narcotics and the law requires that the following information be given to all patients that are prescribed narcotics:    CLASSIFICATION: Pain medications are called Opioids and are narcotics  LEGALITIES: It is illegal to share narcotics with others and to drive within 4 hours of taking narcotics  POTENTIAL SIDE EFFECTS: Potential side effects of opioids include: nausea, vomiting, itching, dizziness, drowsiness, dry mouth, constipation, and difficulty urinating.  POTENTIAL ADVERSE EFFECTS:   Opioid tolerance can develop with use of pain medications and this simply means that it requires more and more of the medication to control pain; however, this is seen more in patients that use opioids for longer periods of time.  Opioid dependence can develop with use of Opioids and this simply means that to stop the medication can cause withdrawal symptoms so wean gradually (do not stop all at once); however, this is seen more with patients that use opioids for longer periods of time.  Opioid addiction can develop with use of Opioids and the incidence of this is very unlikely in patients who take the medications as ordered and stop the medications as instructed.  Opioid overdose can be dangerous, but is unlikely when the medication is taken as ordered and stopped when ordered. It is important not to mix opioids with alcohol or with and type of sedative such as Benadryl  as this can lead to over sedation and respiratory difficulty.    DOSAGE:   Pain medications will need to be taken consistently for the first week to decrease pain and promote adequate pain relief and participation in physical therapy.    After the initial surgical pain begins to resolve, you may begin to decrease the pain medication. By the end of 8 weeks, you should be off of pain medications.    Refills will not be given by the office during evening hours, on weekends.  To seek refills on pain medications during the initial 6 week post-operative period, you must call the office 48 hours in advance to request the refill. The office will then notify you when to  the prescription. DO NOT wait until you are out of the medication to request a refill.        [facility]    KNEE HOME EXERCISES      It is important that you maintain and possibly improve your strength and range of motion of your knee.      •  Walk frequently for short intervals  •  Using a cane or walker to allow you to walk safely if needed  •  Avoid sitting for long periods of time to avoid cramping and swelling of your leg   •  Do exercises either on a bed or in a chair.  •  If any of the exercises cause you pain, either eliminate that exercise or decrease          the motion or repetitions      Start exercises with 10 repetitions and increase to 30 repetitions.  Do two sets of each exercise each day    ANKLE PUMPS    1. Move your feet up and down as if you are pumping on a gas pedal       STRAIGHT LEG RAISES    1. Lie flat with your injured leg straight.  Keep the other leg bent.  2. Tighten the injured leg's thigh muscle.  3. Lift leg, with knee locked in the straight position no higher than the other knee for  seconds  4. Lower leg slowly. Rest for 5 seconds      SHORT ARC QUAD    1. Lie with both knees bent over a pillow  2. Straighten both knees  3. Hold 5 seconds  4. Bend knees back to starting position and repeat  sequence       QUADRICEPS     1. Lie flat with your injured let straight.  Keep the other leg bent.  2. Tighten the injured leg's thigh muscle by trying to move your kneecap toward the  thigh.  3. Make your leg as stiff as you can.  4. Hold for 5 seconds and relax for 5 seconds        HAMSTRING STRETCH    1. Lie flat with your injured leg straight. Keep the other leg bent  2. Press your heel of your injured leg into the floor for 5 seconds       OR  1. Sit with injured leg out straight.   2. Loop a sheet around the ball of foot and toes.  3. Gently pull on the sheet, keeping the leg straight  4. Hold for 10-30 seconds      KNEE FLEXION-EXTENSION SITTING     1. Sit with injured let bent as shown  2. Straighten leg at the knee  3. Hold 5 seconds  4. Bend knee back to starting position   5. Rest for 2-5 seconds and repeat sequence       HEEL SLIDES     1. Lie on back with legs straight  2. Slide heels toward buttocks  3. Return to starting position       HEEL SLIDS WITH SHEET    1. Lie on your back with a sheet wrapped around your foot  2. Pull on the sheet to assist you in bending your knee and sliding your heel toward  your buttocks  3. Hold for 5 seconds        KNEE FLEXION STRETCH    1. Sit in a chair  2. Bend bad knee back as far as you can   3. Hold stretch for 5-10 seconds      CHAIR PUSH UPS    1. Sit in a chair with arm rests  2. Place hands on armrests  3. Straighten arms, raising buttocks up off of chair         WALL SLIDES    1. Stand with your back and head against a wall.    2. Keep your feet shoulder width apart and 6-12 inches from the wall  3. Slowly slide down the wall until your knees are slightly bent.    4. Hold for 5 seconds and then slowly slide back up  5. As you get stronger, then you can slowly increase the bend in your knees, but do  not drop your buttocks below the level of your knees       STEP UPS    1. Stand with your foot on your injured leg on a 3-5 inch support (such as a block of   wood)  2. Place other foot on the floor  3. Shift your weight onto the foot on the block and try to straighten the knee and  raising the other foot off of the floor  4. Slowly lower your foot until only the heel touches the floor

## 2017-09-19 NOTE — PROGRESS NOTES
Loulou Collier : 1940 MRN: [id number] DATE: 2017  Body mass index is 36.36 kg/(m^2).  Vitals:    17 1422   Temp: 97.8 °F (36.6 °C)       Chief Complaint and HPI: 6 weeks follow up left total knee    SUBJECTIVE:Patient returns today for follow up of total joint replacement. Patient reports doing well with no unusual complaints. Appears to be progressing appropriately.    OBJECTIVE:   Exam:. The incision is healing appropriately. No sign of infection. Range of motion is progressing as expected 0/114. The calf is soft and nontender with a negative Homans sign.    DIAGNOSTIC STUDIES  Xrays: 2 views of the left knee (AP full length and lateral) were ordered and images were reviewed for evaluation of recent joint replacement. They demonstrate a well positioned, well aligned total joint replacement without complicating factors noted. In comparison with previous films there has been no alignment change.    ASSESSMENT: status post left total knee replacement expected healing.    PLAN: 1) Continue with PT exercises as prescribed   2) Follow up 3 MONTHS  WITH XRAYS (2 views of same joint).   3) Continue with antibiotic prophylaxis with dental procedures for 2 yrs post total joint replacement.   4) May discontinue Aspirin therapy from surgery at this time and resume medications, vitamins, and supplements you were taking before surgery. You may also restart your meloxicam at this time.     Natty Howard, APRN  2017     Pain Medications utilized after surgery are narcotics and the law requires that the following information be given to all patients that are prescribed narcotics:    CLASSIFICATION: Pain medications are called Opioids and are narcotics  LEGALITIES: It is illegal to share narcotics with others and to drive within 4 hours of taking narcotics  POTENTIAL SIDE EFFECTS: Potential side effects of opioids include: nausea, vomiting, itching, dizziness, drowsiness, dry mouth, constipation, and  difficulty urinating.  POTENTIAL ADVERSE EFFECTS:   Opioid tolerance can develop with use of pain medications and this simply means that it requires more and more of the medication to control pain; however, this is seen more in patients that use opioids for longer periods of time.  Opioid dependence can develop with use of Opioids and this simply means that to stop the medication can cause withdrawal symptoms so wean gradually (do not stop all at once); however, this is seen more with patients that use opioids for longer periods of time.  Opioid addiction can develop with use of Opioids and the incidence of this is very unlikely in patients who take the medications as ordered and stop the medications as instructed.  Opioid overdose can be dangerous, but is unlikely when the medication is taken as ordered and stopped when ordered. It is important not to mix opioids with alcohol or with and type of sedative such as Benadryl as this can lead to over sedation and respiratory difficulty.    DOSAGE:   Pain medications will need to be taken consistently for the first week to decrease pain and promote adequate pain relief and participation in physical therapy.    After the initial surgical pain begins to resolve, you may begin to decrease the pain medication. By the end of 8 weeks, you should be off of pain medications.    Refills will not be given by the office during evening hours, on weekends.  To seek refills on pain medications during the initial 6 week post-operative period, you must call the office 48 hours in advance to request the refill. The office will then notify you when to  the prescription. DO NOT wait until you are out of the medication to request a refill.

## 2017-09-22 ENCOUNTER — TELEPHONE (OUTPATIENT)
Dept: INTERNAL MEDICINE | Facility: CLINIC | Age: 77
End: 2017-09-22

## 2017-09-22 RX ORDER — CIPROFLOXACIN 500 MG/1
500 TABLET, FILM COATED ORAL 2 TIMES DAILY
Qty: 20 TABLET | Refills: 0 | Status: SHIPPED | OUTPATIENT
Start: 2017-09-22 | End: 2017-12-15

## 2017-09-22 NOTE — TELEPHONE ENCOUNTER
----- Message from Mickie Holly sent at 9/22/2017  9:28 AM EDT -----  Contact: Patient  Patient called with complaints of cough productive of thick pale yellow mucus, hoarse, nasal drainage, slight fever up to 101.  Symptoms all week, worse the past 2 days.  Can something be called in?  States she has tried over-the-counter meds - [facility] Daytime and Nighttime capsules and sinus medicine, but nothing worked.  Please advise.      Patient:  [phone] - cell                [phone]    [facility] [id number] [place] [address] [address] AT [facility] [address] - [phone]  - [phone] FX

## 2017-09-29 RX ORDER — SIMVASTATIN 40 MG
TABLET ORAL
Qty: 90 TABLET | Refills: 3 | Status: SHIPPED | OUTPATIENT
Start: 2017-09-29 | End: 2017-12-15

## 2017-11-01 RX ORDER — METOPROLOL SUCCINATE 50 MG/1
TABLET, EXTENDED RELEASE ORAL
Qty: 90 TABLET | Refills: 3 | Status: SHIPPED | OUTPATIENT
Start: 2017-11-01 | End: 2017-12-15 | Stop reason: SDUPTHER

## 2017-11-28 RX ORDER — MELOXICAM 15 MG/1
15 TABLET ORAL DAILY
Qty: 30 TABLET | Refills: 0 | OUTPATIENT
Start: 2017-11-28

## 2017-11-28 RX ORDER — MELOXICAM 15 MG/1
TABLET ORAL
Qty: 90 TABLET | Refills: 3 | Status: SHIPPED | OUTPATIENT
Start: 2017-11-28 | End: 2018-12-18 | Stop reason: SDUPTHER

## 2017-11-30 DIAGNOSIS — I10 ESSENTIAL HYPERTENSION: ICD-10-CM

## 2017-11-30 RX ORDER — HYDROCHLOROTHIAZIDE 12.5 MG/1
CAPSULE, GELATIN COATED ORAL
Qty: 90 CAPSULE | Refills: 1 | Status: SHIPPED | OUTPATIENT
Start: 2017-11-30 | End: 2017-12-15

## 2017-12-15 ENCOUNTER — OFFICE VISIT (OUTPATIENT)
Dept: INTERNAL MEDICINE | Facility: CLINIC | Age: 77
End: 2017-12-15

## 2017-12-15 VITALS
HEART RATE: 88 BPM | HEIGHT: 63 IN | DIASTOLIC BLOOD PRESSURE: 90 MMHG | WEIGHT: 203 LBS | SYSTOLIC BLOOD PRESSURE: 130 MMHG | BODY MASS INDEX: 35.97 KG/M2

## 2017-12-15 DIAGNOSIS — Z23 NEED FOR PNEUMOCOCCAL VACCINATION: ICD-10-CM

## 2017-12-15 DIAGNOSIS — I10 BENIGN ESSENTIAL HTN: ICD-10-CM

## 2017-12-15 DIAGNOSIS — E78.00 ELEVATED CHOLESTEROL: Primary | ICD-10-CM

## 2017-12-15 PROBLEM — Z96.659 HISTORY OF KNEE REPLACEMENT, TOTAL: Status: RESOLVED | Noted: 2017-09-19 | Resolved: 2017-12-15

## 2017-12-15 LAB
ALBUMIN SERPL-MCNC: 4.2 G/DL (ref 3.5–5.2)
ALBUMIN/GLOB SERPL: 1.4 G/DL
ALP SERPL-CCNC: 84 U/L (ref 39–117)
ALT SERPL W P-5'-P-CCNC: 13 U/L (ref 1–33)
ANION GAP SERPL CALCULATED.3IONS-SCNC: 8.3 MMOL/L
AST SERPL-CCNC: 20 U/L (ref 1–32)
BACTERIA UR QL AUTO: ABNORMAL /HPF
BASOPHILS # BLD AUTO: 0.02 10*3/MM3 (ref 0–0.2)
BASOPHILS NFR BLD AUTO: 0.5 % (ref 0–1.5)
BILIRUB SERPL-MCNC: 0.3 MG/DL (ref 0.1–1.2)
BILIRUB UR QL STRIP: NEGATIVE
BUN BLD-MCNC: 16 MG/DL (ref 8–23)
BUN/CREAT SERPL: 18.4 (ref 7–25)
CALCIUM SPEC-SCNC: 9.4 MG/DL (ref 8.6–10.5)
CHLORIDE SERPL-SCNC: 104 MMOL/L (ref 98–107)
CHOLEST SERPL-MCNC: 170 MG/DL (ref 0–200)
CLARITY UR: ABNORMAL
CO2 SERPL-SCNC: 31.7 MMOL/L (ref 22–29)
COLOR UR: YELLOW
CREAT BLD-MCNC: 0.87 MG/DL (ref 0.57–1)
EOSINOPHIL # BLD AUTO: 0.18 10*3/MM3 (ref 0–0.7)
EOSINOPHIL # BLD AUTO: 4.2 % (ref 0.3–6.2)
ERYTHROCYTE [DISTWIDTH] IN BLOOD BY AUTOMATED COUNT: 17.4 % (ref 11.7–13)
GFR SERPL CREATININE-BSD FRML MDRD: 77 ML/MIN/1.73
GLOBULIN UR ELPH-MCNC: 2.9 GM/DL
GLUCOSE BLD-MCNC: 77 MG/DL (ref 65–99)
GLUCOSE UR STRIP-MCNC: NEGATIVE MG/DL
HCT VFR BLD AUTO: 41.2 % (ref 35.6–45.5)
HDLC SERPL-MCNC: 57 MG/DL (ref 40–60)
HGB BLD-MCNC: 13.1 G/DL (ref 11.9–15.5)
HGB UR QL STRIP.AUTO: NEGATIVE
HYALINE CASTS UR QL AUTO: ABNORMAL /LPF
IMM GRANULOCYTES # BLD: 0 10*3/MM3 (ref 0–0.03)
IMM GRANULOCYTES NFR BLD: 0 % (ref 0–0.5)
KETONES UR QL STRIP: NEGATIVE
LDLC SERPL CALC-MCNC: 104 MG/DL (ref 0–100)
LDLC/HDLC SERPL: 1.82 {RATIO}
LEUKOCYTE ESTERASE UR QL STRIP.AUTO: ABNORMAL
LYMPHOCYTES # BLD AUTO: 1.5 10*3/MM3 (ref 0.9–4.8)
LYMPHOCYTES NFR BLD AUTO: 34.6 % (ref 19.6–45.3)
MCH RBC QN AUTO: 27.5 PG (ref 26.9–32)
MCHC RBC AUTO-ENTMCNC: 31.8 G/DL (ref 32.4–36.3)
MCV RBC AUTO: 86.4 FL (ref 80.5–98.2)
MONOCYTES # BLD AUTO: 0.27 10*3/MM3 (ref 0.2–1.2)
MONOCYTES NFR BLD AUTO: 6.2 % (ref 5–12)
MUCOUS THREADS URNS QL MICRO: ABNORMAL /HPF
NEUTROPHILS # BLD AUTO: 2.36 10*3/MM3 (ref 1.9–8.1)
NEUTROPHILS NFR BLD AUTO: 54.5 % (ref 42.7–76)
NITRITE UR QL STRIP: NEGATIVE
PH UR STRIP.AUTO: 6.5 [PH] (ref 5–8)
PLATELET # BLD AUTO: 230 10*3/MM3 (ref 140–500)
POTASSIUM BLD-SCNC: 4 MMOL/L (ref 3.5–5.2)
PROT SERPL-MCNC: 7.1 G/DL (ref 6–8.5)
PROT UR QL STRIP: NEGATIVE
RBC # BLD AUTO: 4.77 10*6/MM3 (ref 3.9–5.2)
RBC # UR: ABNORMAL /HPF
REF LAB TEST METHOD: ABNORMAL
SODIUM BLD-SCNC: 144 MMOL/L (ref 136–145)
SP GR UR STRIP: 1.02 (ref 1–1.03)
SQUAMOUS #/AREA URNS HPF: ABNORMAL /HPF
TRIGL SERPL-MCNC: 46 MG/DL (ref 0–150)
UROBILINOGEN UR QL STRIP: ABNORMAL
VLDLC SERPL-MCNC: 9.2 MG/DL (ref 5–40)
WBC # BLD AUTO: 4.33 10*3/MM3 (ref 4.5–10.7)
WBC UR QL AUTO: ABNORMAL /HPF

## 2017-12-15 PROCEDURE — 80053 COMPREHEN METABOLIC PANEL: CPT | Performed by: INTERNAL MEDICINE

## 2017-12-15 PROCEDURE — G0009 ADMIN PNEUMOCOCCAL VACCINE: HCPCS | Performed by: INTERNAL MEDICINE

## 2017-12-15 PROCEDURE — 99397 PER PM REEVAL EST PAT 65+ YR: CPT | Performed by: INTERNAL MEDICINE

## 2017-12-15 PROCEDURE — 90670 PCV13 VACCINE IM: CPT | Performed by: INTERNAL MEDICINE

## 2017-12-15 PROCEDURE — 81001 URINALYSIS AUTO W/SCOPE: CPT | Performed by: INTERNAL MEDICINE

## 2017-12-15 PROCEDURE — 80061 LIPID PANEL: CPT | Performed by: INTERNAL MEDICINE

## 2017-12-15 RX ORDER — METOPROLOL SUCCINATE 50 MG/1
50 TABLET, EXTENDED RELEASE ORAL DAILY
Qty: 90 TABLET | Refills: 3 | Status: SHIPPED | OUTPATIENT
Start: 2017-12-15 | End: 2018-12-05 | Stop reason: SDUPTHER

## 2017-12-15 NOTE — PROGRESS NOTES
Subjective   Loulou Collier is a 77 y.o. female.     History of Present Illness she is here today for her yearly physical exam which includes follow-up of hypertension, hypercholesterolemia, and osteoarthritis of the knees.  She had left total knee replacement in August and she has done exceedingly well.  She has no pain and no limitation in motion.  She denies any dizziness or focal neurologic episodes.  She does not have any PND, DEJESUS, or chest pain.  She denies any dysuria or vaginal bleeding.  She previously has had a hysterectomy.  She had a normal mammogram this year.  In fact her review systems is negative.        Review of Systems   Constitutional: Negative for activity change, appetite change and fatigue.   HENT: Negative for trouble swallowing.    Respiratory: Negative for cough, chest tightness, shortness of breath and wheezing.    Cardiovascular: Negative for chest pain, palpitations and leg swelling.   Gastrointestinal: Negative for abdominal pain, anal bleeding, blood in stool, constipation, diarrhea, nausea and vomiting.   Genitourinary: Negative for difficulty urinating, dysuria, flank pain, frequency, hematuria and vaginal bleeding.   Musculoskeletal: Negative for arthralgias and gait problem.   Neurological: Negative for dizziness, syncope, facial asymmetry, speech difficulty, weakness, numbness and headaches.   Psychiatric/Behavioral: Negative for dysphoric mood. The patient is not nervous/anxious.        Objective   Physical Exam   Constitutional: She is oriented to person, place, and time. Vital signs are normal. She appears well-developed and well-nourished. She is active. She does not appear ill.   HENT:   Right Ear: Tympanic membrane, external ear and ear canal normal.   Left Ear: Tympanic membrane, external ear and ear canal normal.   Mouth/Throat: No oral lesions.   Eyes: Conjunctivae are normal.   Fundoscopic exam:       The right eye shows no AV nicking, no exudate and no hemorrhage.         The left eye shows no AV nicking, no exudate and no hemorrhage.   Neck: Carotid bruit is not present. No thyroid mass and no thyromegaly present.   Cardiovascular: Normal rate, regular rhythm, S1 normal and S2 normal.  Exam reveals no S3 and no S4.    No murmur heard.  Pulses:       Posterior tibial pulses are 2+ on the right side, and 2+ on the left side.   Pulmonary/Chest: No tachypnea. No respiratory distress. She has no decreased breath sounds. She has no wheezes. She has no rhonchi. She has no rales.   Abdominal: Soft. Normal appearance and bowel sounds are normal. She exhibits no abdominal bruit and no mass. There is no hepatosplenomegaly. There is no tenderness.   Genitourinary:             Vascular Status -  Her exam exhibits no right foot edema. Her exam exhibits no left foot edema.  Lymphadenopathy:     She has no cervical adenopathy.   Neurological: She is alert and oriented to person, place, and time. She has normal strength. Gait normal.   Reflex Scores:       Bicep reflexes are 2+ on the right side.  Psychiatric: She has a normal mood and affect. Her speech is normal and behavior is normal. Judgment and thought content normal. Cognition and memory are normal.       Assessment/Plan blood pressure by me 132/85    Assessment #1 hypertension-fair control lower diastolic would be appropriate #2 hypokalemia-no sign of target organ injury and generally well-controlled.  Her overall health is quite good at age 77.    Plan #1 Prevnar 13 vaccination today #2 restart metoprolol XL 50 mg by mouth daily #3 CBC, CMP, urinalysis, lipids.  Routine follow-up with me in one year.

## 2017-12-28 RX ORDER — CIPROFLOXACIN 500 MG/1
TABLET, FILM COATED ORAL
Qty: 20 TABLET | Refills: 0 | OUTPATIENT
Start: 2017-12-28

## 2018-01-11 ENCOUNTER — TELEPHONE (OUTPATIENT)
Dept: INTERNAL MEDICINE | Facility: CLINIC | Age: 78
End: 2018-01-11

## 2018-01-11 NOTE — TELEPHONE ENCOUNTER
----- Message from Kusum Heck sent at 1/11/2018 10:24 AM EST -----  Contact: pt  Pt was diagnosed with bronchitis from an urgent care, she had azithro 250 mg   And an inhaler.  This was around 1-3 and she still is showing symptoms, coughing.    Please advise.  [facility] [address] [place] [address] [address] AT [facility] [phone] [facility] [phone] FX      Pt#[phone]

## 2018-01-18 ENCOUNTER — OFFICE VISIT (OUTPATIENT)
Dept: ORTHOPEDIC SURGERY | Facility: CLINIC | Age: 78
End: 2018-01-18

## 2018-01-18 VITALS — BODY MASS INDEX: 35.97 KG/M2 | TEMPERATURE: 98.1 F | WEIGHT: 203 LBS | HEIGHT: 63 IN

## 2018-01-18 DIAGNOSIS — Z96.652 STATUS POST TOTAL LEFT KNEE REPLACEMENT: Primary | ICD-10-CM

## 2018-01-18 PROCEDURE — 73560 X-RAY EXAM OF KNEE 1 OR 2: CPT | Performed by: NURSE PRACTITIONER

## 2018-01-18 PROCEDURE — 99212 OFFICE O/P EST SF 10 MIN: CPT | Performed by: NURSE PRACTITIONER

## 2018-01-18 RX ORDER — ALBUTEROL SULFATE 90 UG/1
AEROSOL, METERED RESPIRATORY (INHALATION)
Refills: 0 | COMMUNITY
Start: 2018-01-02 | End: 2018-03-20

## 2018-01-19 ENCOUNTER — OFFICE VISIT (OUTPATIENT)
Dept: INTERNAL MEDICINE | Facility: CLINIC | Age: 78
End: 2018-01-19

## 2018-01-19 VITALS
SYSTOLIC BLOOD PRESSURE: 128 MMHG | DIASTOLIC BLOOD PRESSURE: 80 MMHG | WEIGHT: 204 LBS | OXYGEN SATURATION: 96 % | BODY MASS INDEX: 36.72 KG/M2 | HEART RATE: 61 BPM | TEMPERATURE: 99 F

## 2018-01-19 DIAGNOSIS — J40 BRONCHITIS: Primary | ICD-10-CM

## 2018-01-19 DIAGNOSIS — R30.0 BURNING WITH URINATION: ICD-10-CM

## 2018-01-19 DIAGNOSIS — N30.90 CYSTITIS: ICD-10-CM

## 2018-01-19 LAB
BACTERIA UR QL AUTO: ABNORMAL /HPF
BILIRUB UR QL STRIP: NEGATIVE
CLARITY UR: ABNORMAL
COLOR UR: YELLOW
GLUCOSE UR STRIP-MCNC: NEGATIVE MG/DL
HGB UR QL STRIP.AUTO: ABNORMAL
HYALINE CASTS UR QL AUTO: ABNORMAL /LPF
KETONES UR QL STRIP: ABNORMAL
LEUKOCYTE ESTERASE UR QL STRIP.AUTO: ABNORMAL
MUCOUS THREADS URNS QL MICRO: ABNORMAL /HPF
NITRITE UR QL STRIP: NEGATIVE
PH UR STRIP.AUTO: 6 [PH] (ref 5–8)
PROT UR QL STRIP: ABNORMAL
RBC # UR: ABNORMAL /HPF
REF LAB TEST METHOD: ABNORMAL
SP GR UR STRIP: 1.02 (ref 1–1.03)
SQUAMOUS #/AREA URNS HPF: ABNORMAL /HPF
UROBILINOGEN UR QL STRIP: ABNORMAL
WBC UR QL AUTO: ABNORMAL /HPF

## 2018-01-19 PROCEDURE — 99213 OFFICE O/P EST LOW 20 MIN: CPT | Performed by: NURSE PRACTITIONER

## 2018-01-19 PROCEDURE — 81001 URINALYSIS AUTO W/SCOPE: CPT | Performed by: NURSE PRACTITIONER

## 2018-01-19 RX ORDER — CIPROFLOXACIN 250 MG/1
250 TABLET, FILM COATED ORAL EVERY 12 HOURS SCHEDULED
Qty: 10 TABLET | Refills: 0 | Status: SHIPPED | OUTPATIENT
Start: 2018-01-19 | End: 2018-01-22 | Stop reason: ALTCHOICE

## 2018-01-19 RX ORDER — METHYLPREDNISOLONE 4 MG/1
TABLET ORAL
Qty: 21 EACH | Refills: 0 | Status: SHIPPED | OUTPATIENT
Start: 2018-01-19 | End: 2018-03-20

## 2018-01-19 RX ORDER — BENZONATATE 200 MG/1
200 CAPSULE ORAL 3 TIMES DAILY PRN
Qty: 30 CAPSULE | Refills: 0 | Status: SHIPPED | OUTPATIENT
Start: 2018-01-19 | End: 2018-03-20

## 2018-01-19 NOTE — PROGRESS NOTES
Subjective   Loulou Collier is a 77 y.o. female.     HPI Comments: She went to [facility] on 1/1/18 secondary to cough and flu like symptoms . She test negative for flu and was treated for bronchitis with z-india. She presents today secondary cough.     Bronchitis   This is a new problem. The current episode started 1 to 4 weeks ago. The problem occurs intermittently. The problem has been gradually improving. Associated symptoms include congestion, coughing (varies production ) and urinary symptoms (burning,). Pertinent negatives include no abdominal pain, chest pain, chills, fatigue, fever, headaches, nausea, sore throat or vomiting. Treatments tried: z-india, inhaler,  The treatment provided moderate relief.   Difficulty Urinating   This is a new problem. The current episode started 1 to 4 weeks ago. Associated symptoms include congestion, coughing (varies production ) and urinary symptoms (burning,). Pertinent negatives include no abdominal pain, chest pain, chills, fatigue, fever, headaches, nausea, sore throat or vomiting.        The following portions of the patient's history were reviewed and updated as appropriate: allergies, current medications and problem list.    Review of Systems   Constitutional: Negative for appetite change, chills, fatigue and fever.   HENT: Positive for congestion. Negative for ear discharge, ear pain, facial swelling, hearing loss, postnasal drip, rhinorrhea, sinus pressure, sneezing, sore throat and tinnitus.    Respiratory: Positive for cough (varies production ). Negative for chest tightness, shortness of breath and wheezing.    Cardiovascular: Negative for chest pain, palpitations and leg swelling.   Gastrointestinal: Negative for abdominal pain, diarrhea, nausea and vomiting.   Genitourinary: Positive for difficulty urinating. Negative for dysuria, frequency, hematuria and urgency.   Musculoskeletal: Negative for back pain.   Neurological: Negative for headaches.   Hematological:  Negative for adenopathy.       Objective   Physical Exam   Constitutional: She appears well-developed and well-nourished. She is cooperative. She does not have a sickly appearance. She does not appear ill.   HENT:   Head: Normocephalic.   Right Ear: Hearing and external ear normal. No drainage, swelling or tenderness. No mastoid tenderness. Tympanic membrane is bulging. Tympanic membrane is not injected, not scarred and not erythematous. Tympanic membrane mobility is normal. No middle ear effusion. No decreased hearing is noted.   Left Ear: Hearing and external ear normal. No drainage, swelling or tenderness. No mastoid tenderness. Tympanic membrane is bulging. Tympanic membrane is not injected, not scarred and not erythematous. Tympanic membrane mobility is normal.  No middle ear effusion. No decreased hearing is noted.   Nose: No mucosal edema, rhinorrhea, sinus tenderness or nasal deformity. Right sinus exhibits maxillary sinus tenderness. Right sinus exhibits no frontal sinus tenderness. Left sinus exhibits maxillary sinus tenderness. Left sinus exhibits no frontal sinus tenderness.   Mouth/Throat: Oropharynx is clear and moist and mucous membranes are normal. Normal dentition.   Eyes: Conjunctivae and lids are normal. Pupils are equal, round, and reactive to light. Right eye exhibits no discharge and no exudate. Left eye exhibits no discharge and no exudate.   Neck: Trachea normal and normal range of motion. No edema present. No thyroid mass and no thyromegaly present.   Cardiovascular: Regular rhythm, normal heart sounds and normal pulses.    No murmur heard.  Pulmonary/Chest: Breath sounds normal. No respiratory distress. She has no decreased breath sounds. She has no wheezes. She has no rhonchi. She has no rales.   Dry cough    Lymphadenopathy:        Head (right side): No submental, no submandibular, no tonsillar, no preauricular, no posterior auricular and no occipital adenopathy present.        Head (left  side): No submental, no submandibular, no tonsillar, no preauricular, no posterior auricular and no occipital adenopathy present.     She has no cervical adenopathy.   Neurological: She is alert.   Skin: Skin is warm, dry and intact. No cyanosis. Nails show no clubbing.       Assessment/Plan   Loulou was seen today for bronchitis and difficulty urinating.    Diagnoses and all orders for this visit:    Bronchitis  -     benzonatate (TESSALON) 200 MG capsule; Take 1 capsule by mouth 3 (Three) Times a Day As Needed for Cough.  -     MethylPREDNISolone (MEDROL, JUN,) 4 MG tablet; Take as directed on package instructions.    Burning with urination  -     Urinalysis With / Microscopic If Indicated - Urine, Clean Catch  -     Urinalysis, Microscopic Only - Urine, Clean Catch; Future  -     Urinalysis, Microscopic Only - Urine, Clean Catch  -     Urine Culture - Urine, Urine, Clean Catch; Future  -     Urine Culture - Urine, Urine, Clean Catch    Cystitis  Comments:  drink plenty of water; avoid caffiene   Orders:  -     ciprofloxacin (CIPRO) 250 MG tablet; Take 1 tablet by mouth Every 12 (Twelve) Hours for 5 days.    She will return for imaging if worsening of symptoms  Urine with blood, wbc and bacteria. Treatment started with obtain urine culture.

## 2018-01-19 NOTE — PATIENT INSTRUCTIONS
Urinary Tract Infection, Adult  A urinary tract infection (UTI) is an infection of any part of the urinary tract, which includes the kidneys, ureters, bladder, and urethra. These organs make, store, and get rid of urine in the body. UTI can be a bladder infection (cystitis) or kidney infection (pyelonephritis).  What are the causes?  This infection may be caused by fungi, viruses, or bacteria. Bacteria are the most common cause of UTIs. This condition can also be caused by repeated incomplete emptying of the bladder during urination.  What increases the risk?  This condition is more likely to develop if:  · You ignore your need to urinate or hold urine for long periods of time.  · You do not empty your bladder completely during urination.  · You wipe back to front after urinating or having a bowel movement, if you are female.  · You are uncircumcised, if you are male.  · You are constipated.  · You have a urinary catheter that stays in place (indwelling).  · You have a weak defense (immune) system.  · You have a medical condition that affects your bowels, kidneys, or bladder.  · You have diabetes.  · You take antibiotic medicines frequently or for long periods of time, and the antibiotics no longer work well against certain types of infections (antibiotic resistance).  · You take medicines that irritate your urinary tract.  · You are exposed to chemicals that irritate your urinary tract.  · You are female.  What are the signs or symptoms?  Symptoms of this condition include:  · Fever.  · Frequent urination or passing small amounts of urine frequently.  · Needing to urinate urgently.  · Pain or burning with urination.  · Urine that smells bad or unusual.  · Cloudy urine.  · Pain in the lower abdomen or back.  · Trouble urinating.  · Blood in the urine.  · Vomiting or being less hungry than normal.  · Diarrhea or abdominal pain.  · Vaginal discharge, if you are female.  How is this diagnosed?  This condition is  diagnosed with a medical history and physical exam. You will also need to provide a urine sample to test your urine. Other tests may be done, including:  · Blood tests.  · Sexually transmitted disease (STD) testing.  If you have had more than one UTI, a cystoscopy or imaging studies may be done to determine the cause of the infections.  How is this treated?  Treatment for this condition often includes a combination of two or more of the following:  · Antibiotic medicine.  · Other medicines to treat less common causes of UTI.  · Over-the-counter medicines to treat pain.  · Drinking enough water to stay hydrated.  Follow these instructions at home:  · Take over-the-counter and prescription medicines only as told by your health care provider.  · If you were prescribed an antibiotic, take it as told by your health care provider. Do not stop taking the antibiotic even if you start to feel better.  · Avoid alcohol, caffeine, tea, and carbonated beverages. They can irritate your bladder.  · Drink enough fluid to keep your urine clear or pale yellow.  · Keep all follow-up visits as told by your health care provider. This is important.  · Make sure to:  ¨ Empty your bladder often and completely. Do not hold urine for long periods of time.  ¨ Empty your bladder before and after sex.  ¨ Wipe from front to back after a bowel movement if you are female. Use each tissue one time when you wipe.  Contact a health care provider if:  · You have back pain.  · You have a fever.  · You feel nauseous or vomit.  · Your symptoms do not get better after 3 days.  · Your symptoms go away and then return.  Get help right away if:  · You have severe back pain or lower abdominal pain.  · You are vomiting and cannot keep down any medicines or water.  This information is not intended to replace advice given to you by your health care provider. Make sure you discuss any questions you have with your health care provider.  Document Released:  09/27/2006 Document Revised: 05/31/2017 Document Reviewed: 11/07/2016  [employer] Interactive Patient Education © 2017 [employer]  Acute Bronchitis, Adult  Acute bronchitis is sudden (acute) swelling of the air tubes (bronchi) in the lungs. Acute bronchitis causes these tubes to fill with mucus, which can make it hard to breathe. It can also cause coughing or wheezing.  In adults, acute bronchitis usually goes away within 2 weeks. A cough caused by bronchitis may last up to 3 weeks. Smoking, allergies, and asthma can make the condition worse. Repeated episodes of bronchitis may cause further lung problems, such as chronic obstructive pulmonary disease (COPD).  What are the causes?  This condition can be caused by germs and by substances that irritate the lungs, including:  · Cold and flu viruses. This condition is most often caused by the same virus that causes a cold.  · Bacteria.  · Exposure to tobacco smoke, dust, fumes, and air pollution.  What increases the risk?  This condition is more likely to develop in people who:  · Have close contact with someone with acute bronchitis.  · Are exposed to lung irritants, such as tobacco smoke, dust, fumes, and vapors.  · Have a weak immune system.  · Have a respiratory condition such as asthma.  What are the signs or symptoms?  Symptoms of this condition include:  · A cough.  · Coughing up clear, yellow, or green mucus.  · Wheezing.  · Chest congestion.  · Shortness of breath.  · A fever.  · Body aches.  · Chills.  · A sore throat.  How is this diagnosed?  This condition is usually diagnosed with a physical exam. During the exam, your health care provider may order tests, such as chest X-rays, to rule out other conditions. He or she may also:  · Test a sample of your mucus for bacterial infection.  · Check the level of oxygen in your blood. This is done to check for pneumonia.  · Do a chest X-ray or lung function testing to rule out pneumonia and other  conditions.  · Perform blood tests.  Your health care provider will also ask about your symptoms and medical history.  How is this treated?  Most cases of acute bronchitis clear up over time without treatment. Your health care provider may recommend:  · Drinking more fluids. Drinking more makes your mucus thinner, which may make it easier to breathe.  · Taking a medicine for a fever or cough.  · Taking an antibiotic medicine.  · Using an inhaler to help improve shortness of breath and to control a cough.  · Using a cool mist vaporizer or humidifier to make it easier to breathe.  Follow these instructions at home:  Medicines  · Take over-the-counter and prescription medicines only as told by your health care provider.  · If you were prescribed an antibiotic, take it as told by your health care provider. Do not stop taking the antibiotic even if you start to feel better.  General instructions  · Get plenty of rest.  · Drink enough fluids to keep your urine clear or pale yellow.  · Avoid smoking and secondhand smoke. Exposure to cigarette smoke or irritating chemicals will make bronchitis worse. If you smoke and you need help quitting, ask your health care provider. Quitting smoking will help your lungs heal faster.  · Use an inhaler, cool mist vaporizer, or humidifier as told by your health care provider.  · Keep all follow-up visits as told by your health care provider. This is important.  How is this prevented?  To lower your risk of getting this condition again:  · Wash your hands often with soap and water. If soap and water are not available, use hand .  · Avoid contact with people who have cold symptoms.  · Try not to touch your hands to your mouth, nose, or eyes.  · Make sure to get the flu shot every year.  Contact a health care provider if:  · Your symptoms do not improve in 2 weeks of treatment.  Get help right away if:  · You cough up blood.  · You have chest pain.  · You have severe shortness of  breath.  · You become dehydrated.  · You faint or keep feeling like you are going to faint.  · You keep vomiting.  · You have a severe headache.  · Your fever or chills gets worse.  This information is not intended to replace advice given to you by your health care provider. Make sure you discuss any questions you have with your health care provider.  Document Released: 01/25/2006 Document Revised: 07/12/2017 Document Reviewed: 06/07/2017  [employer] Interactive Patient Education © 2017 [employer]

## 2018-01-22 DIAGNOSIS — N30.90 CYSTITIS: Primary | ICD-10-CM

## 2018-01-22 LAB
BACTERIA UR CULT: ABNORMAL
Lab: ABNORMAL
SUSCEPTIBILITY TESTING: ABNORMAL

## 2018-01-22 RX ORDER — NITROFURANTOIN 25; 75 MG/1; MG/1
100 CAPSULE ORAL EVERY 12 HOURS SCHEDULED
Qty: 10 CAPSULE | Refills: 0 | Status: SHIPPED | OUTPATIENT
Start: 2018-01-22 | End: 2018-01-27

## 2018-03-20 ENCOUNTER — OFFICE VISIT (OUTPATIENT)
Dept: INTERNAL MEDICINE | Facility: CLINIC | Age: 78
End: 2018-03-20

## 2018-03-20 VITALS
BODY MASS INDEX: 36.32 KG/M2 | OXYGEN SATURATION: 96 % | WEIGHT: 205 LBS | HEIGHT: 63 IN | SYSTOLIC BLOOD PRESSURE: 140 MMHG | HEART RATE: 67 BPM | DIASTOLIC BLOOD PRESSURE: 86 MMHG

## 2018-03-20 DIAGNOSIS — E78.00 ELEVATED CHOLESTEROL: ICD-10-CM

## 2018-03-20 DIAGNOSIS — M17.12 PRIMARY OSTEOARTHRITIS OF LEFT KNEE: Primary | ICD-10-CM

## 2018-03-20 DIAGNOSIS — I10 BENIGN ESSENTIAL HTN: ICD-10-CM

## 2018-03-20 PROCEDURE — 99213 OFFICE O/P EST LOW 20 MIN: CPT | Performed by: INTERNAL MEDICINE

## 2018-03-20 NOTE — PROGRESS NOTES
Subjective   Loulou Collier is a 78 y.o. female.     History of Present Illness she is here today for follow-up of hypertension, upper cholesterolemia, and right shoulder pain.  Over the last 2-3 months she has had increasing pain in the right shoulder with radiation down the arm as well as the right scapular area.  She is on meloxicam 15 mg daily for chronic arthritis.  Otherwise she has felt well.  She denies any dizziness or focal neurologic episodes.  She has not had any DEJESUS, PND, chest pain, or swelling in the ankles.        Review of Systems   Constitutional: Negative for activity change, appetite change and fatigue.   Respiratory: Negative for cough, chest tightness, shortness of breath and wheezing.    Cardiovascular: Negative for chest pain, palpitations and leg swelling.   Musculoskeletal: Positive for arthralgias and neck pain.   Neurological: Negative for dizziness, numbness and headaches.       Objective   Physical Exam   Constitutional: She is oriented to person, place, and time. She appears well-developed and well-nourished. She is active. She does not appear ill.   Eyes: Conjunctivae are normal.   Cardiovascular: Normal rate, regular rhythm, S1 normal and S2 normal.  Exam reveals no S3 and no S4.    No murmur heard.  Pulmonary/Chest: No tachypnea. No respiratory distress. She has no decreased breath sounds. She has no wheezes. She has no rhonchi. She has no rales.   Abdominal: Soft. Normal appearance and bowel sounds are normal. She exhibits no mass. There is no hepatosplenomegaly. There is no tenderness.   Musculoskeletal:        Arms:    Vascular Status -  Her right foot exhibits normal right foot edema. Her left foot exhibits normal left foot edema.  Neurological: She is alert and oriented to person, place, and time. She has normal strength. Gait normal.   Psychiatric: She has a normal mood and affect. Her speech is normal and behavior is normal. Judgment and thought content normal. Cognition and  memory are normal.         Assessment/Plan December lab showed normal CBC and CMP.  Total cholesterol 170 triglycerides 46 HDL cholesterol 57 LDL cholesterol 104    Assessment #1 right shoulder pain-likely a combination of osteoarthritis and rotator cuff injury and this was discussed with her #2 hypertension-borderline systolic today #3 hypercholesterolemia-good control and without sign of target organ injury    Plan #1 medium heat 20 minutes 3 times a day and Tylenol extra strength one with lunch and 1 at bedtime #2 continue meloxicam 15 mg by mouth daily #3 orthopedic consult.

## 2018-04-03 ENCOUNTER — OFFICE VISIT (OUTPATIENT)
Dept: ORTHOPEDIC SURGERY | Facility: CLINIC | Age: 78
End: 2018-04-03

## 2018-04-03 VITALS — WEIGHT: 205 LBS | TEMPERATURE: 97.6 F | HEIGHT: 63 IN | BODY MASS INDEX: 36.32 KG/M2

## 2018-04-03 DIAGNOSIS — M75.101 TEAR OF RIGHT ROTATOR CUFF, UNSPECIFIED TEAR EXTENT: ICD-10-CM

## 2018-04-03 DIAGNOSIS — M25.511 CHRONIC RIGHT SHOULDER PAIN: Primary | ICD-10-CM

## 2018-04-03 DIAGNOSIS — G89.29 CHRONIC RIGHT SHOULDER PAIN: Primary | ICD-10-CM

## 2018-04-03 DIAGNOSIS — IMO0002 BURSITIS/TENDONITIS, SHOULDER: ICD-10-CM

## 2018-04-03 PROCEDURE — 73030 X-RAY EXAM OF SHOULDER: CPT | Performed by: ORTHOPAEDIC SURGERY

## 2018-04-03 PROCEDURE — 99214 OFFICE O/P EST MOD 30 MIN: CPT | Performed by: ORTHOPAEDIC SURGERY

## 2018-04-03 PROCEDURE — 20610 DRAIN/INJ JOINT/BURSA W/O US: CPT | Performed by: ORTHOPAEDIC SURGERY

## 2018-04-03 RX ADMIN — METHYLPREDNISOLONE ACETATE 80 MG: 80 INJECTION, SUSPENSION INTRA-ARTICULAR; INTRALESIONAL; INTRAMUSCULAR; SOFT TISSUE at 14:22

## 2018-04-03 RX ADMIN — LIDOCAINE HYDROCHLORIDE 4 ML: 20 INJECTION, SOLUTION EPIDURAL; INFILTRATION; INTRACAUDAL; PERINEURAL at 14:22

## 2018-04-03 NOTE — PROGRESS NOTES
New Right Shoulder      Patient: Loulou Collier        YOB: 1940    Medical Record Number: [id number]        Chief Complaints: right shoulder pain  Chief Complaint   Patient presents with   • Right Shoulder - Pain, Establish Care           History of Present Illness: This is a  78 y.o. female who presents with Complaints of right shoulder pain she is right-hand-dominant is been ongoing for 2 months no one history of event or change in activity or injury that initiated her symptoms she does have night pain she can get her bra on no history of similar symptoms has had some shoulder pain in the past but not like what she is experiencing now.  Her symptoms are severe intermittent stabbing aching burning she did see Dr. Gurpreet Valenzuela who referred her here.  She is a retired principal past medical history is remarkable for hypertension arthritis and high cholesterol      Allergies:   Allergies   Allergen Reactions   • Cephalosporins Hives   • Penicillins Hives       Medications:   Home Medications:  Current Outpatient Prescriptions on File Prior to Visit   Medication Sig   • hydrochlorothiazide (MICROZIDE) 12.5 MG capsule TK 1 C PO QD   • meloxicam (MOBIC) 15 MG tablet TAKE 1 TABLET BY MOUTH EVERY DAY WITH FOOD   • metoprolol succinate XL (TOPROL-XL) 50 MG 24 hr tablet Take 1 tablet by mouth Daily.   • simvastatin (ZOCOR) 40 MG tablet Take 40 mg by mouth Every Night.     No current facility-administered medications on file prior to visit.      Current Medications:  Scheduled Meds:  Continuous Infusions:  No current facility-administered medications for this visit.   PRN Meds:.    Past Medical History:   Diagnosis Date   • Arthritis    • High cholesterol    • Hypertension         Past Surgical History:   Procedure Laterality Date   • BREAST BIOPSY     • HYSTERECTOMY     • JOINT REPLACEMENT      RT KNEE   • KNEE SURGERY     • TOTAL KNEE ARTHROPLASTY Left 8/2/2017    Procedure: TOTAL KNEE ARTHROPLASTY WITH  "NITIN NAVIGATION;  Surgeon: Rui Craft MD;  Location: [facility] OR;  Service:         Social History     Occupational History   • Not on file.     Social History Main Topics   • Smoking status: Never Smoker   • Smokeless tobacco: Never Used   • Alcohol use No   • Drug use: No   • Sexual activity: Not on file    Social History     Social History Narrative   • No narrative on file        Family History   Problem Relation Age of Onset   • Hypertension Other    • Cancer Other    • Diabetes Other    • No Known Problems Mother    • No Known Problems Father    • No Known Problems Sister    • No Known Problems Brother    • No Known Problems Daughter    • No Known Problems Son    • No Known Problems Maternal Grandmother    • No Known Problems Paternal Grandmother    • No Known Problems Maternal Aunt    • No Known Problems Paternal Aunt    • BRCA 1/2 Neg Hx    • Breast cancer Neg Hx    • Colon cancer Neg Hx    • Endometrial cancer Neg Hx    • Ovarian cancer Neg Hx    • Malig Hyperthermia Neg Hx              Review of Systems: 14 point review of systems are remarkable for the shoulder pain only the remainder are negative    Review of Systems      Physical Exam: 78 y.o. female  General Appearance:    Alert, cooperative, in no acute distress                 Vitals:    04/03/18 1353   Temp: 97.6 °F (36.4 °C)   Weight: 93 kg (205 lb)   Height: 158.8 cm (62.5\")      Patient is alert and read ×3 no acute distress appears her above-listed at height weight and age.  Affect is normal respiratory rate is normal unlabored. Heart rate regular rate rhythm, sclera, dentition and hearing are normal for the purpose of this exam.    Ortho Exam Physical exam of the right shoulder reveals no overlying skin changes no lymphedema no lymphadenopathy.  Patient has active flexion 180 with mild symptoms abduction is similar external rotation is to 50 and internal rotation to the upper lumbar spine with mild symptoms.  Patient has good rotator " cuff strength 4+ over 5 with isometric strength testing with pain.  Patient has a positive impingement and a positive [place] sign.  Patient has good cervical range of motion which is full and asymptomatic no radicular symptoms.  Patient has a normal elbow exam.  Good distal pulses are present  Patient has pain with overhead activity and a positive Neer sign and a positive empty can sign , a positive drop arm and a definitive painful arc    Large Joint Arthrocentesis  Date/Time: 4/3/2018 2:22 PM  Consent given by: patient  Site marked: site marked  Timeout: Immediately prior to procedure a time out was called to verify the correct patient, procedure, equipment, support staff and site/side marked as required   Supporting Documentation  Indications: pain   Procedure Details  Location: shoulder - R subacromial bursa  Preparation: Patient was prepped and draped in the usual sterile fashion  Needle size: 25 G  Approach: posterior  Medications administered: 80 mg methylPREDNISolone acetate 80 MG/ML; 4 mL lidocaine PF 2% 2 %  Patient tolerance: patient tolerated the procedure well with no immediate complications                Radiology:   AP, Scapular Y  of the right shoulder were ordered/reviewed to evauate shoulder pain.  I've no comparative films she has some acromioclavicular arthritis as well as some sclerosis at the tuberosity no acute bony pathology is seen  Xr Shoulder 2+ View Right    Result Date: 4/3/2018  Ordering physician's impression is located in the Encounter Note dated 04/03/18. X-ray performed in the CR room.       Assessment/Plan: Right shoulder pain I suspect this is rotator cuff and some fashion we talked about options would like to get her on Mobic with strict precautions I will also inject her as a diagnostic and therapeutic tool if she fails to improve we will pursue other means of testing

## 2018-04-09 RX ORDER — METHYLPREDNISOLONE ACETATE 80 MG/ML
80 INJECTION, SUSPENSION INTRA-ARTICULAR; INTRALESIONAL; INTRAMUSCULAR; SOFT TISSUE
Status: COMPLETED | OUTPATIENT
Start: 2018-04-03 | End: 2018-04-03

## 2018-04-09 RX ORDER — LIDOCAINE HYDROCHLORIDE 20 MG/ML
4 INJECTION, SOLUTION EPIDURAL; INFILTRATION; INTRACAUDAL; PERINEURAL
Status: COMPLETED | OUTPATIENT
Start: 2018-04-03 | End: 2018-04-03

## 2018-06-07 ENCOUNTER — OFFICE VISIT (OUTPATIENT)
Dept: INTERNAL MEDICINE | Facility: CLINIC | Age: 78
End: 2018-06-07

## 2018-06-07 VITALS
SYSTOLIC BLOOD PRESSURE: 162 MMHG | HEART RATE: 83 BPM | TEMPERATURE: 98.4 F | OXYGEN SATURATION: 99 % | DIASTOLIC BLOOD PRESSURE: 100 MMHG | BODY MASS INDEX: 36.68 KG/M2 | WEIGHT: 207 LBS | HEIGHT: 63 IN

## 2018-06-07 DIAGNOSIS — J01.90 ACUTE SINUSITIS, RECURRENCE NOT SPECIFIED, UNSPECIFIED LOCATION: ICD-10-CM

## 2018-06-07 DIAGNOSIS — J02.9 SORE THROAT: Primary | ICD-10-CM

## 2018-06-07 LAB
EXPIRATION DATE: NORMAL
INTERNAL CONTROL: NORMAL
Lab: NORMAL
S PYO AG THROAT QL: NEGATIVE

## 2018-06-07 PROCEDURE — 87880 STREP A ASSAY W/OPTIC: CPT | Performed by: NURSE PRACTITIONER

## 2018-06-07 PROCEDURE — 99213 OFFICE O/P EST LOW 20 MIN: CPT | Performed by: NURSE PRACTITIONER

## 2018-06-07 RX ORDER — DOXYCYCLINE HYCLATE 100 MG/1
100 CAPSULE ORAL 2 TIMES DAILY
Qty: 14 CAPSULE | Refills: 0 | Status: SHIPPED | OUTPATIENT
Start: 2018-06-07 | End: 2018-06-14

## 2018-06-07 RX ORDER — FLUTICASONE PROPIONATE 50 MCG
2 SPRAY, SUSPENSION (ML) NASAL DAILY
Qty: 1 BOTTLE | Refills: 1 | Status: SHIPPED | OUTPATIENT
Start: 2018-06-07 | End: 2018-07-07

## 2018-06-07 RX ORDER — GUAIFENESIN 600 MG/1
600 TABLET, EXTENDED RELEASE ORAL 2 TIMES DAILY
Qty: 14 TABLET | Refills: 0
Start: 2018-06-07 | End: 2018-06-14

## 2018-06-07 NOTE — PATIENT INSTRUCTIONS

## 2018-06-07 NOTE — PROGRESS NOTES
Subjective   Loulou Collier is a 78 y.o. female.     She was recently on a cruise. No known ID contact.       Sinus Problem   This is a new problem. The current episode started 1 to 4 weeks ago. The problem has been gradually worsening since onset. There has been no fever. Her pain is at a severity of 6/10. The pain is moderate. Associated symptoms include congestion, coughing (varies in production ), sinus pressure and a sore throat. Pertinent negatives include no chills, ear pain, headaches, shortness of breath or sneezing. (Nausea ) Treatments tried: mucinex  The treatment provided mild relief.   Fatigue   This is a new problem. The current episode started 1 to 4 weeks ago. Associated symptoms include congestion, coughing (varies in production ), fatigue, nausea and a sore throat. Pertinent negatives include no abdominal pain, chest pain, chills, fever, headaches or vomiting.        The following portions of the patient's history were reviewed and updated as appropriate: allergies, current medications, past social history and problem list.    Review of Systems   Constitutional: Positive for fatigue. Negative for appetite change, chills and fever.   HENT: Positive for congestion, postnasal drip, sinus pressure and sore throat. Negative for ear discharge, ear pain, facial swelling, hearing loss, rhinorrhea, sneezing and tinnitus.    Respiratory: Positive for cough (varies in production ). Negative for chest tightness, shortness of breath and wheezing.    Cardiovascular: Negative for chest pain, palpitations and leg swelling.   Gastrointestinal: Positive for nausea. Negative for abdominal pain, diarrhea and vomiting.   Allergic/Immunologic: Positive for environmental allergies (seasona; ).   Neurological: Negative for headaches.   Hematological: Negative for adenopathy.       Objective   Physical Exam   Constitutional: She appears well-developed and well-nourished. She is cooperative. She does not have a sickly  appearance. She does not appear ill.   HENT:   Head: Normocephalic.   Right Ear: Hearing and external ear normal. No drainage, swelling or tenderness. No mastoid tenderness. Tympanic membrane is bulging. Tympanic membrane is not injected, not scarred and not erythematous. Tympanic membrane mobility is normal. No middle ear effusion. No decreased hearing is noted.   Left Ear: Hearing and external ear normal. No drainage, swelling or tenderness. No mastoid tenderness. Tympanic membrane is bulging. Tympanic membrane is not injected, not scarred and not erythematous. Tympanic membrane mobility is normal.  No middle ear effusion. No decreased hearing is noted.   Nose: Mucosal edema present. No rhinorrhea, sinus tenderness or nasal deformity. Right sinus exhibits maxillary sinus tenderness. Right sinus exhibits no frontal sinus tenderness. Left sinus exhibits maxillary sinus tenderness. Left sinus exhibits no frontal sinus tenderness.   Mouth/Throat: Mucous membranes are normal. Normal dentition. Posterior oropharyngeal erythema (clear hypersecretions ) present.   Eyes: Conjunctivae and lids are normal. Pupils are equal, round, and reactive to light. Right eye exhibits no discharge and no exudate. Left eye exhibits no discharge and no exudate.   Neck: Trachea normal and normal range of motion. No edema present. No thyroid mass and no thyromegaly present.   Cardiovascular: Regular rhythm, normal heart sounds and normal pulses.    No murmur heard.  Repeat bp left arm 148/98   Pulmonary/Chest: Breath sounds normal. No respiratory distress. She has no decreased breath sounds. She has no wheezes. She has no rhonchi. She has no rales.   Lymphadenopathy:        Head (right side): No submental, no submandibular, no tonsillar, no preauricular, no posterior auricular and no occipital adenopathy present.        Head (left side): No submental, no submandibular, no tonsillar, no preauricular, no posterior auricular and no occipital  adenopathy present.     She has no cervical adenopathy.   Neurological: She is alert.   Skin: Skin is warm, dry and intact. No cyanosis. Nails show no clubbing.       Assessment/Plan   Loulou was seen today for sinus problem and fatigue.    Diagnoses and all orders for this visit:    Sore throat  -     POCT rapid strep A    Acute sinusitis, recurrence not specified, unspecified location  -     guaiFENesin (MUCINEX) 600 MG 12 hr tablet; Take 1 tablet by mouth 2 (Two) Times a Day for 7 days.  -     fluticasone (FLONASE) 50 MCG/ACT nasal spray; 2 sprays into each nostril Daily for 30 days. Administer 2 sprays in each nostril for each dose.  -     doxycycline (VIBRAMYCIN) 100 MG capsule; Take 1 capsule by mouth 2 (Two) Times a Day for 7 days.    Negative strep. She will return for worsening of symptoms. She was given sun precautions.

## 2018-07-27 ENCOUNTER — OFFICE VISIT (OUTPATIENT)
Dept: ORTHOPEDIC SURGERY | Facility: CLINIC | Age: 78
End: 2018-07-27

## 2018-07-27 VITALS — WEIGHT: 207 LBS | BODY MASS INDEX: 36.68 KG/M2 | TEMPERATURE: 96.8 F | HEIGHT: 63 IN

## 2018-07-27 DIAGNOSIS — Z96.652 HISTORY OF TOTAL KNEE ARTHROPLASTY, LEFT: Primary | ICD-10-CM

## 2018-07-27 DIAGNOSIS — Z96.652 STATUS POST TOTAL LEFT KNEE REPLACEMENT: ICD-10-CM

## 2018-07-27 PROCEDURE — 99212 OFFICE O/P EST SF 10 MIN: CPT | Performed by: NURSE PRACTITIONER

## 2018-07-27 PROCEDURE — 73560 X-RAY EXAM OF KNEE 1 OR 2: CPT | Performed by: NURSE PRACTITIONER

## 2018-07-27 NOTE — PROGRESS NOTES
NAME: Loulou Collier  : 1940   MRN: [id number]   DATE: 2018    CC: 1 year Follow up status post total joint replacementon the leftknee and 3 years on the right knee    SUBJECTIVE:    HPI: Patient returns today for a follow up of total joint replacement. Patient reports doing well with no unusual complaints. Appears to be progressing appropriately.  HPI    This problem is not new to this examiner.     Allergies:   Allergies   Allergen Reactions   • Cephalosporins Hives   • Penicillins Hives       Medications:   Home Medications:  Current Outpatient Prescriptions on File Prior to Visit   Medication Sig   • hydrochlorothiazide (MICROZIDE) 12.5 MG capsule TK 1 C PO QD   • meloxicam (MOBIC) 15 MG tablet TAKE 1 TABLET BY MOUTH EVERY DAY WITH FOOD   • metoprolol succinate XL (TOPROL-XL) 50 MG 24 hr tablet Take 1 tablet by mouth Daily.   • simvastatin (ZOCOR) 40 MG tablet Take 40 mg by mouth Every Night.     No current facility-administered medications on file prior to visit.        Current Medications:  Scheduled Meds:  Continuous Infusions:  No current facility-administered medications for this visit.   PRN Meds:.    I have reviewed the patient's medical history in detail and updated the computerized patient record.  Review and summarization of old records include:    Past Medical History:   Diagnosis Date   • Arthritis    • High cholesterol    • Hypertension         Past Surgical History:   Procedure Laterality Date   • BREAST BIOPSY     • HYSTERECTOMY     • JOINT REPLACEMENT      RT KNEE   • KNEE SURGERY     • TOTAL KNEE ARTHROPLASTY Left 2017    Procedure: TOTAL KNEE ARTHROPLASTY WITH NITIN NAVIGATION;  Surgeon: Rui Craft MD;  Location: [facility] OR;  Service:         Social History     Occupational History   • Not on file.     Social History Main Topics   • Smoking status: Never Smoker   • Smokeless tobacco: Never Used   • Alcohol use No   • Drug use: No   • Sexual activity: Not on file     "Social History     Social History Narrative   • No narrative on file        Family History   Problem Relation Age of Onset   • Hypertension Other    • Cancer Other    • Diabetes Other    • No Known Problems Mother    • No Known Problems Father    • No Known Problems Sister    • No Known Problems Brother    • No Known Problems Daughter    • No Known Problems Son    • No Known Problems Maternal Grandmother    • No Known Problems Paternal Grandmother    • No Known Problems Maternal Aunt    • No Known Problems Paternal Aunt    • BRCA 1/2 Neg Hx    • Breast cancer Neg Hx    • Colon cancer Neg Hx    • Endometrial cancer Neg Hx    • Ovarian cancer Neg Hx    • Malig Hyperthermia Neg Hx        ROS: 14 point review of systems was performed and was negative except for documented findings in HPI and today's encounter.     Allergies:   Allergies   Allergen Reactions   • Cephalosporins Hives   • Penicillins Hives     Constitutional:  Denies fever, shaking or chills   Eyes:  Denies change in visual acuity   HENT:  Denies nasal congestion or sore throat   Respiratory:  Denies cough or shortness of breath   Cardiovascular:  Denies chest pain or severe LE edema   GI:  Denies abdominal pain, nausea, vomiting, bloody stools or diarrhea   Musculoskeletal:  Denies numbness tingling or loss of motor function except as outlined above in history of present illness.  : Denies painful urination or hematuria  Integument:  Denies rash, lesion or ulceration   Neurologic:  Denies headache or focal weakness  Endocrine:  Denies lymphadenopathy  Psych:  Denies confusion or change in mental status   Hem:  Denies active bleeding        OBJECTIVE:     Physical Exam:  Vital Signs:    Wt Readings from Last 3 Encounters:   07/27/18 93.9 kg (207 lb)   06/07/18 93.9 kg (207 lb)   04/03/18 93 kg (205 lb)     Ht Readings from Last 3 Encounters:   07/27/18 158.8 cm (62.5\")   06/07/18 158.8 cm (62.5\")   04/03/18 158.8 cm (62.5\")     Body mass index is 37.26 " kg/m².  Facility age limit for growth percentiles is 20 years.  Vitals:    07/27/18 0912   Temp: 96.8 °F (36 °C)       Constitutional: Awake alert and oriented x3, well developed, well nourished, no acute distress, non-toxic appearance.  HEENT:  Normocephalic, Atraumatic, Bilateral external ears normal, Oropharynx moist, No oral exudates, Nose normal.   Respiratory:  No respiratory distress, No wheezing  CV: No palpitations  Vascular:  Brisk cap refill, Intact distal pulses, No cyanosis, all compartments soft with no signs or symptoms of compartment syndrome or DVT.  Neurologic: Sensation grossly intact to light touch throughout the involved extremity and bilaterally symmetric, deep tendon reflexes are 2+ and bilaterally symmetric, No focal deficits noted.   Neck:  Normal range of motion, No tenderness, Supple, Negative Spurlings.  Integument: Well hydrated, no rash, warm, dry, no lesions or ulceration.   Musculoskeletal:  Affected extremity post op incision is healed appropriately. No sign of infection. Range of motion is progressing as expected. The calf is soft and nontender with a negative Homans sign. Distal pulses intact. Normal amount of swelling present for recovery time.     DIAGNOSTIC STUDIES  Imaging done today, images were personally viewed and discussed with the patient:  Indication, findings and comparison: 2V AP&Lat of the operative joint viewed for evaluation of past joint replacement and discussed with patient. They demonstrate a well positioned, well aligned total joint replacement without complicating factors noted. In comparison with the film from the last office visit, there has been no alignment change.    ASSESSMENT: Status post left total knee replacement with expected healing    PLAN: 1) Continue home PT exercises as needed.   2) Continue with antibiotic prophylaxis with dental procedures for 2 yrs post total joint replacement.   3) Follow up as ordered.    7/27/2018  Patient was seen by  DARON Villafana in the office today.

## 2018-10-05 RX ORDER — HYDROCHLOROTHIAZIDE 12.5 MG/1
12.5 CAPSULE, GELATIN COATED ORAL EVERY MORNING
Qty: 90 CAPSULE | Refills: 0 | Status: SHIPPED | OUTPATIENT
Start: 2018-10-05 | End: 2018-12-05 | Stop reason: SDUPTHER

## 2018-12-05 ENCOUNTER — OFFICE VISIT (OUTPATIENT)
Dept: INTERNAL MEDICINE | Facility: CLINIC | Age: 78
End: 2018-12-05

## 2018-12-05 VITALS
HEIGHT: 62 IN | SYSTOLIC BLOOD PRESSURE: 140 MMHG | TEMPERATURE: 97.6 F | DIASTOLIC BLOOD PRESSURE: 98 MMHG | HEART RATE: 96 BPM | BODY MASS INDEX: 38.46 KG/M2 | WEIGHT: 209 LBS | OXYGEN SATURATION: 93 %

## 2018-12-05 DIAGNOSIS — N64.59 OTHER SIGNS AND SYMPTOMS IN BREAST: ICD-10-CM

## 2018-12-05 DIAGNOSIS — M17.12 PRIMARY OSTEOARTHRITIS OF LEFT KNEE: ICD-10-CM

## 2018-12-05 DIAGNOSIS — I10 BENIGN ESSENTIAL HTN: ICD-10-CM

## 2018-12-05 DIAGNOSIS — Z12.31 ENCOUNTER FOR SCREENING MAMMOGRAM FOR BREAST CANCER: ICD-10-CM

## 2018-12-05 DIAGNOSIS — D50.0 IRON DEFICIENCY ANEMIA DUE TO CHRONIC BLOOD LOSS: ICD-10-CM

## 2018-12-05 DIAGNOSIS — E78.00 ELEVATED CHOLESTEROL: Primary | ICD-10-CM

## 2018-12-05 DIAGNOSIS — R73.09 ELEVATED GLUCOSE: ICD-10-CM

## 2018-12-05 DIAGNOSIS — M24.80 CERVICAL SPINE CREPITUS: ICD-10-CM

## 2018-12-05 DIAGNOSIS — M54.2 NECK PAIN OF OVER 3 MONTHS DURATION: ICD-10-CM

## 2018-12-05 PROCEDURE — 99214 OFFICE O/P EST MOD 30 MIN: CPT | Performed by: FAMILY MEDICINE

## 2018-12-05 RX ORDER — SIMVASTATIN 40 MG
40 TABLET ORAL NIGHTLY
Qty: 90 TABLET | Refills: 3 | Status: SHIPPED | OUTPATIENT
Start: 2018-12-05 | End: 2020-01-13

## 2018-12-05 RX ORDER — METOPROLOL SUCCINATE 50 MG/1
50 TABLET, EXTENDED RELEASE ORAL DAILY
Qty: 90 TABLET | Refills: 3 | Status: SHIPPED | OUTPATIENT
Start: 2018-12-05 | End: 2019-06-05 | Stop reason: ALTCHOICE

## 2018-12-05 RX ORDER — HYDROCHLOROTHIAZIDE 12.5 MG/1
12.5 CAPSULE, GELATIN COATED ORAL EVERY MORNING
Qty: 90 CAPSULE | Refills: 3 | Status: SHIPPED | OUTPATIENT
Start: 2018-12-05 | End: 2020-01-13

## 2018-12-05 NOTE — PROGRESS NOTES
Subjective   Loulou Collier is a 78 y.o. female.     Chief Complaint   Patient presents with   • Hypertension   • Hyperlipidemia   Neck pain      History of Present Illness   Delightful lady with a history of hypertension hyperlipidemia followed by Dr. Valenzuela.  She is in need of Pneumovax after December/15.  She defers flu shot.  We discussed hepatitis A and she defers that for now.  Otherwise she has had a crepitus in the neck for at least 3 months after going to the dentist.  We need to cervical spine x-ray.        We'll get screening labs including iron studies with history of iron deficient anemia following knee surgery.  We'll order mammograms.  She has had a Pap smear last year.  We'll consider repeating pressure with GYN next year.          The following portions of the patient's history were reviewed and updated as appropriate: allergies, current medications, past social history and problem list.    Review of Systems   Constitutional: Negative.    HENT: Negative.    Eyes: Negative.    Respiratory: Negative.    Cardiovascular: Negative.    Gastrointestinal: Negative.    Endocrine: Negative.    Genitourinary: Negative.    Musculoskeletal: Negative.    Skin: Negative.    Allergic/Immunologic: Negative.    Neurological: Negative.    Hematological: Negative.    Psychiatric/Behavioral: Negative.        Objective   Vitals:    12/05/18 1148   BP: 140/98   Pulse: 96   Temp: 97.6 °F (36.4 °C)   SpO2: 93%     Physical Exam   Constitutional: She is oriented to person, place, and time. She appears well-developed and well-nourished.   HENT:   Head: Normocephalic and atraumatic.   Right Ear: Tympanic membrane and external ear normal.   Left Ear: Tympanic membrane and external ear normal.   Nose: Nose normal.   Mouth/Throat: Oropharynx is clear and moist.   Eyes: Conjunctivae and EOM are normal. Pupils are equal, round, and reactive to light.   Neck: Neck supple. No JVD present. Muscular tenderness present. Decreased range  of motion present. No thyromegaly present.   Cardiovascular: Normal rate, regular rhythm, normal heart sounds and intact distal pulses.   Pulmonary/Chest: Effort normal and breath sounds normal.   Abdominal: Soft. Bowel sounds are normal.   Lymphadenopathy:     She has no cervical adenopathy.   Neurological: She is alert and oriented to person, place, and time. No cranial nerve deficit. Coordination normal.   Skin: Skin is warm and dry. No rash noted.   Psychiatric: She has a normal mood and affect. Her behavior is normal. Judgment and thought content normal.   Vitals reviewed.      Assessment/Plan   Problem List Items Addressed This Visit        Cardiovascular and Mediastinum    Elevated cholesterol - Primary    Relevant Medications    simvastatin (ZOCOR) 40 MG tablet    Other Relevant Orders    CBC & Differential    Comprehensive Metabolic Panel    Lipid Panel With / Chol / HDL Ratio    Hemoglobin A1c    Urinalysis With Microscopic If Indicated (No Culture) - Urine, Clean Catch    TSH    T4, Free    T3, Free    Benign essential HTN    Relevant Medications    metoprolol succinate XL (TOPROL-XL) 50 MG 24 hr tablet    hydrochlorothiazide (MICROZIDE) 12.5 MG capsule    Other Relevant Orders    CBC & Differential    Comprehensive Metabolic Panel    Lipid Panel With / Chol / HDL Ratio    Hemoglobin A1c    Urinalysis With Microscopic If Indicated (No Culture) - Urine, Clean Catch    TSH    T4, Free    T3, Free       Musculoskeletal and Integument    Cervical spine crepitus    Relevant Orders    XR Spine Cervical Complete 4 or 5 View    Primary osteoarthritis of knee       Hematopoietic and Hemostatic    Iron deficiency anemia due to chronic blood loss    Relevant Medications    Ferrous Sulfate (IRON) 28 MG tablet    Other Relevant Orders    Iron and TIBC    Ferritin      Other Visit Diagnoses     Encounter for screening mammogram for breast cancer        Relevant Orders    Mammo diagnostic bilateral w CAD    Other signs  and symptoms in breast         Relevant Orders    Mammo diagnostic bilateral w CAD    Elevated glucose        Relevant Orders    CBC & Differential    Comprehensive Metabolic Panel    Lipid Panel With / Chol / HDL Ratio    Hemoglobin A1c    Urinalysis With Microscopic If Indicated (No Culture) - Urine, Clean Catch    TSH    T4, Free    T3, Free    Neck pain of over 3 months duration        Relevant Orders    XR Spine Cervical Complete 4 or 5 View      Cervical spine x-ray.  Screening labs.  Recheck in 6 months.  Pneumovax 23 after December 15.

## 2018-12-06 LAB
ALBUMIN SERPL-MCNC: 4.6 G/DL (ref 3.5–5.2)
ALBUMIN/GLOB SERPL: 2.1 G/DL
ALP SERPL-CCNC: 75 U/L (ref 39–117)
ALT SERPL-CCNC: 13 U/L (ref 1–33)
APPEARANCE UR: CLEAR
AST SERPL-CCNC: 18 U/L (ref 1–32)
BACTERIA #/AREA URNS HPF: NORMAL /HPF
BASOPHILS # BLD AUTO: 0.01 10*3/MM3 (ref 0–0.2)
BASOPHILS NFR BLD AUTO: 0.2 % (ref 0–1.5)
BILIRUB SERPL-MCNC: 0.4 MG/DL (ref 0.1–1.2)
BILIRUB UR QL STRIP: NEGATIVE
BUN SERPL-MCNC: 15 MG/DL (ref 8–23)
BUN/CREAT SERPL: 19.5 (ref 7–25)
CALCIUM SERPL-MCNC: 9.7 MG/DL (ref 8.6–10.5)
CASTS URNS MICRO: NORMAL
CHLORIDE SERPL-SCNC: 104 MMOL/L (ref 98–107)
CHOLEST SERPL-MCNC: 171 MG/DL (ref 0–200)
CHOLEST/HDLC SERPL: 3.23 {RATIO}
CO2 SERPL-SCNC: 31.3 MMOL/L (ref 22–29)
COLOR UR: YELLOW
CREAT SERPL-MCNC: 0.77 MG/DL (ref 0.57–1)
EOSINOPHIL # BLD AUTO: 0.11 10*3/MM3 (ref 0–0.7)
EOSINOPHIL NFR BLD AUTO: 1.8 % (ref 0.3–6.2)
EPI CELLS #/AREA URNS HPF: NORMAL /HPF
ERYTHROCYTE [DISTWIDTH] IN BLOOD BY AUTOMATED COUNT: 16.4 % (ref 11.7–13)
FERRITIN SERPL-MCNC: 417.4 NG/ML (ref 13–150)
GLOBULIN SER CALC-MCNC: 2.2 GM/DL
GLUCOSE SERPL-MCNC: 93 MG/DL (ref 65–99)
GLUCOSE UR QL: NEGATIVE
HBA1C MFR BLD: 5.6 % (ref 4.8–5.6)
HCT VFR BLD AUTO: 40.8 % (ref 35.6–45.5)
HDLC SERPL-MCNC: 53 MG/DL (ref 40–60)
HGB BLD-MCNC: 13.4 G/DL (ref 11.9–15.5)
HGB UR QL STRIP: NEGATIVE
IMM GRANULOCYTES # BLD: 0.01 10*3/MM3 (ref 0–0.03)
IMM GRANULOCYTES NFR BLD: 0.2 % (ref 0–0.5)
IRON SATN MFR SERPL: 13 % (ref 20–50)
IRON SERPL-MCNC: 44 MCG/DL (ref 37–145)
KETONES UR QL STRIP: NEGATIVE
LDLC SERPL CALC-MCNC: 105 MG/DL (ref 0–100)
LEUKOCYTE ESTERASE UR QL STRIP: ABNORMAL
LYMPHOCYTES # BLD AUTO: 1.68 10*3/MM3 (ref 0.9–4.8)
LYMPHOCYTES NFR BLD AUTO: 28.2 % (ref 19.6–45.3)
MCH RBC QN AUTO: 28.3 PG (ref 26.9–32)
MCHC RBC AUTO-ENTMCNC: 32.8 G/DL (ref 32.4–36.3)
MCV RBC AUTO: 86.3 FL (ref 80.5–98.2)
MONOCYTES # BLD AUTO: 0.26 10*3/MM3 (ref 0.2–1.2)
MONOCYTES NFR BLD AUTO: 4.4 % (ref 5–12)
NEUTROPHILS # BLD AUTO: 3.9 10*3/MM3 (ref 1.9–8.1)
NEUTROPHILS NFR BLD AUTO: 65.4 % (ref 42.7–76)
NITRITE UR QL STRIP: NEGATIVE
PH UR STRIP: 6.5 [PH] (ref 5–8)
PLATELET # BLD AUTO: 221 10*3/MM3 (ref 140–500)
POTASSIUM SERPL-SCNC: 3.8 MMOL/L (ref 3.5–5.2)
PROT SERPL-MCNC: 6.8 G/DL (ref 6–8.5)
PROT UR QL STRIP: NEGATIVE
RBC # BLD AUTO: 4.73 10*6/MM3 (ref 3.9–5.2)
RBC #/AREA URNS HPF: NORMAL /HPF
SODIUM SERPL-SCNC: 145 MMOL/L (ref 136–145)
SP GR UR: 1.02 (ref 1–1.03)
T3FREE SERPL-MCNC: 2.8 PG/ML (ref 2–4.4)
T4 FREE SERPL-MCNC: 1.17 NG/DL (ref 0.93–1.7)
TIBC SERPL-MCNC: 336 MCG/DL
TRIGL SERPL-MCNC: 67 MG/DL (ref 0–150)
TSH SERPL DL<=0.005 MIU/L-ACNC: 1.98 MIU/ML (ref 0.27–4.2)
UIBC SERPL-MCNC: 292 MCG/DL
UROBILINOGEN UR STRIP-MCNC: ABNORMAL MG/DL
VLDLC SERPL CALC-MCNC: 13.4 MG/DL (ref 5–40)
WBC # BLD AUTO: 5.96 10*3/MM3 (ref 4.5–10.7)
WBC #/AREA URNS HPF: NORMAL /HPF

## 2018-12-18 RX ORDER — MELOXICAM 15 MG/1
TABLET ORAL
Qty: 90 TABLET | Refills: 0 | Status: SHIPPED | OUTPATIENT
Start: 2018-12-18 | End: 2019-04-01 | Stop reason: SDUPTHER

## 2018-12-19 ENCOUNTER — CLINICAL SUPPORT (OUTPATIENT)
Dept: INTERNAL MEDICINE | Facility: CLINIC | Age: 78
End: 2018-12-19

## 2018-12-19 DIAGNOSIS — Z23 NEED FOR VACCINATION: Primary | ICD-10-CM

## 2018-12-19 PROCEDURE — 90732 PPSV23 VACC 2 YRS+ SUBQ/IM: CPT | Performed by: FAMILY MEDICINE

## 2018-12-19 PROCEDURE — G0009 ADMIN PNEUMOCOCCAL VACCINE: HCPCS | Performed by: FAMILY MEDICINE

## 2018-12-20 ENCOUNTER — HOSPITAL ENCOUNTER (OUTPATIENT)
Dept: GENERAL RADIOLOGY | Facility: HOSPITAL | Age: 78
Discharge: HOME | End: 2018-12-20

## 2018-12-20 ENCOUNTER — HOSPITAL ENCOUNTER (OUTPATIENT)
Dept: MAMMOGRAPHY | Facility: HOSPITAL | Age: 78
Discharge: HOME | End: 2018-12-20
Admitting: FAMILY MEDICINE

## 2018-12-20 DIAGNOSIS — Z12.31 ENCOUNTER FOR SCREENING MAMMOGRAM FOR BREAST CANCER: ICD-10-CM

## 2018-12-20 DIAGNOSIS — N64.59 OTHER SIGNS AND SYMPTOMS IN BREAST: ICD-10-CM

## 2018-12-20 DIAGNOSIS — M24.80 CERVICAL SPINE CREPITUS: ICD-10-CM

## 2018-12-20 DIAGNOSIS — M54.2 NECK PAIN OF OVER 3 MONTHS DURATION: ICD-10-CM

## 2018-12-20 PROCEDURE — 77067 SCR MAMMO BI INCL CAD: CPT

## 2018-12-20 PROCEDURE — 77063 BREAST TOMOSYNTHESIS BI: CPT

## 2018-12-20 PROCEDURE — 72050 X-RAY EXAM NECK SPINE 4/5VWS: CPT

## 2018-12-27 PROBLEM — Z23 NEED FOR VACCINATION: Status: ACTIVE | Noted: 2018-12-27

## 2019-01-07 DIAGNOSIS — R93.7 ABNORMAL THREE VIEW X-RAY OF CERVICAL SPINE: Primary | ICD-10-CM

## 2019-01-16 ENCOUNTER — HOSPITAL ENCOUNTER (OUTPATIENT)
Dept: MRI IMAGING | Facility: HOSPITAL | Age: 79
Discharge: HOME | End: 2019-01-16
Admitting: FAMILY MEDICINE

## 2019-01-16 DIAGNOSIS — R93.7 ABNORMAL THREE VIEW X-RAY OF CERVICAL SPINE: ICD-10-CM

## 2019-01-16 PROCEDURE — 72141 MRI NECK SPINE W/O DYE: CPT

## 2019-01-18 DIAGNOSIS — R93.7 ABNORMAL MRI, CERVICAL SPINE: Primary | ICD-10-CM

## 2019-01-23 ENCOUNTER — OFFICE VISIT (OUTPATIENT)
Dept: NEUROSURGERY | Facility: CLINIC | Age: 79
End: 2019-01-23

## 2019-01-23 VITALS
RESPIRATION RATE: 18 BRPM | WEIGHT: 209 LBS | HEART RATE: 73 BPM | DIASTOLIC BLOOD PRESSURE: 80 MMHG | HEIGHT: 62 IN | SYSTOLIC BLOOD PRESSURE: 126 MMHG | BODY MASS INDEX: 38.46 KG/M2

## 2019-01-23 DIAGNOSIS — M24.80 CERVICAL SPINE CREPITUS: Primary | ICD-10-CM

## 2019-01-23 PROCEDURE — 99204 OFFICE O/P NEW MOD 45 MIN: CPT | Performed by: NEUROLOGICAL SURGERY

## 2019-01-23 RX ORDER — NABUMETONE 500 MG/1
500 TABLET, FILM COATED ORAL DAILY
COMMUNITY
End: 2019-04-01

## 2019-01-23 NOTE — PROGRESS NOTES
Subjective   Patient ID: Loulou Collier is a 78 y.o. female is being seen for consultation today at the request of Abebe Mancilla Jr., MD for neck pain. Patient had MRI cervical 1/16/19 and presents unaccompanied.     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  This began without inciting event about 3-4 months ago.  NO falls or trauma.  She is a non smoker.  She reports her pain is 5/10 and this is right sided into her arm/shulder and deltoid region.  She takes nabumetone for other arthritic issues.  She did have bilateral knee replacements.  She reports no weakness.  She reports crepitus in the right shoulder.  She has neck crepitus as well.  She does report some limitation of cervical ROM.  She denies gait issues.  She denies fine motor tasking issues.      The following portions of the patient's history were reviewed and updated as appropriate: allergies, current medications, past family history, past medical history, past social history, past surgical history and problem list.    Review of Systems   Constitutional: Negative for chills and fever.   HENT: Negative for tinnitus.    Eyes: Negative for visual disturbance.   Respiratory: Negative for cough, shortness of breath and wheezing.    Cardiovascular: Negative for chest pain and palpitations.   Gastrointestinal: Negative for abdominal pain, nausea and vomiting.   Genitourinary: Negative for difficulty urinating and enuresis.   Musculoskeletal: Positive for neck pain (RUE pain) and neck stiffness.   Skin: Negative for rash.   Neurological: Negative for weakness and numbness.   Psychiatric/Behavioral: Negative for sleep disturbance.       Objective   Physical Exam   Constitutional: She is oriented to person, place, and time.   Neurological: She is oriented to person, place, and time.   Reflex Scores:       Tricep reflexes are 1+ on the right side and 1+ on the left side.       Bicep reflexes are 1+ on the right  side and 1+ on the left side.       Brachioradialis reflexes are 1+ on the right side and 1+ on the left side.       Patellar reflexes are 1+ on the right side and 1+ on the left side.       Achilles reflexes are 1+ on the right side and 1+ on the left side.    Neurologic Exam     Mental Status   Oriented to person, place, and time.     Motor Exam   Muscle bulk: normal    Strength   Right deltoid: 5/5  Left deltoid: 5/5  Right biceps: 5/5  Left biceps: 5/5  Right triceps: 5/5  Left triceps: 5/5  Right wrist extension: 5/5  Left wrist extension: 5/5  Right interossei: 5/5  Left interossei: 5/5  Right iliopsoas: 5/5  Left iliopsoas: 5/5  Right quadriceps: 5/5  Left quadriceps: 5/5  Right anterior tibial: 5/5  Left anterior tibial: 5/5  Right gastroc: 5/5  Left gastroc: 5/5    Sensory Exam   Right arm light touch: normal  Left arm light touch: normal  Right leg light touch: normal  Left leg light touch: normal  Right arm pinprick: normal  Left arm pinprick: normal  Right leg pinprick: normal  Left leg pinprick: normal    Gait, Coordination, and Reflexes     Reflexes   Right brachioradialis: 1+  Left brachioradialis: 1+  Right biceps: 1+  Left biceps: 1+  Right triceps: 1+  Left triceps: 1+  Right patellar: 1+  Left patellar: 1+  Right achilles: 1+  Left achilles: 1+  Right Osuna: absent  Left Osuna: absent  Right ankle clonus: absent  Left ankle clonus: absent     Romberg neg  Pain with rotation at right shoulder rand AC joint    Assessment/Plan   Independent Review of Radiographic Studies:    Reversal of lordosis is noted.  She has acquired stenosis with cord contouring.  There is no C45 foraminal stenosis to the right which would explain her right sided shoulder region  Medical Decision Making:  We discussed that she does have moderate spinal stenosis.  There is no cord signal change.  SHe is at risk of developing myelopathy but does not have have this now.  SHe reports her right shoulder region is most  troubling.  I do not think this is related to C5 root compression.  I suggest the following: seek evaluation by Dr. Craft for consideration of a shoulder injection, if this fails consider a cervical ARON.  I think given the degree of stenosis I would like to follow her.  We discussed ladders and working from heights and night light use to avoid falls are good ideas.  She does not require surgery as yet.  I discussed red flags.      Loulou was seen today for neck pain.    Diagnoses and all orders for this visit:    Cervical spine crepitus      No Follow-up on file.

## 2019-02-14 ENCOUNTER — OFFICE VISIT (OUTPATIENT)
Dept: ORTHOPEDIC SURGERY | Facility: CLINIC | Age: 79
End: 2019-02-14

## 2019-02-14 VITALS — WEIGHT: 210 LBS | HEIGHT: 62 IN | BODY MASS INDEX: 38.64 KG/M2 | TEMPERATURE: 98.6 F

## 2019-02-14 DIAGNOSIS — M75.101 TEAR OF RIGHT ROTATOR CUFF, UNSPECIFIED TEAR EXTENT: Primary | ICD-10-CM

## 2019-02-14 PROCEDURE — 20610 DRAIN/INJ JOINT/BURSA W/O US: CPT | Performed by: ORTHOPAEDIC SURGERY

## 2019-02-14 PROCEDURE — 99213 OFFICE O/P EST LOW 20 MIN: CPT | Performed by: ORTHOPAEDIC SURGERY

## 2019-02-14 RX ORDER — LIDOCAINE HYDROCHLORIDE 10 MG/ML
4 INJECTION, SOLUTION EPIDURAL; INFILTRATION; INTRACAUDAL; PERINEURAL
Status: COMPLETED | OUTPATIENT
Start: 2019-02-14 | End: 2019-02-14

## 2019-02-14 RX ORDER — METHYLPREDNISOLONE ACETATE 80 MG/ML
80 INJECTION, SUSPENSION INTRA-ARTICULAR; INTRALESIONAL; INTRAMUSCULAR; SOFT TISSUE
Status: COMPLETED | OUTPATIENT
Start: 2019-02-14 | End: 2019-02-14

## 2019-02-14 RX ADMIN — LIDOCAINE HYDROCHLORIDE 4 ML: 10 INJECTION, SOLUTION EPIDURAL; INFILTRATION; INTRACAUDAL; PERINEURAL at 15:53

## 2019-02-14 RX ADMIN — METHYLPREDNISOLONE ACETATE 80 MG: 80 INJECTION, SUSPENSION INTRA-ARTICULAR; INTRALESIONAL; INTRAMUSCULAR; SOFT TISSUE at 15:53

## 2019-02-14 NOTE — PROGRESS NOTES
New rightShoulder      Patient: Loulou Collier        YOB: 1940    Medical Record Number: [id number]        Chief Complaints: right shoulder pain  Chief Complaint   Patient presents with   • Right Shoulder - Pain       History of Present Illness: This is a 79-year-old female right-hand-dominant who I saw last year in April for right shoulder pain we injected her she did great until 2-3 months ago she has had presentation of shoulder pain she states it is a little different than the pain she had last year no history injury change in activity she has pain with activity symptoms are severe stabbing aching burning worse with activity somewhat better with rest she is a retired principal past medical history is unremarkable      Allergies:   Allergies   Allergen Reactions   • Cephalosporins Hives   • Lisinopril Other (See Comments)     Cough   • Penicillins Hives       Medications:   Home Medications:  Current Outpatient Medications on File Prior to Visit   Medication Sig   • Ferrous Sulfate (IRON) 28 MG tablet Take  by mouth.   • hydrochlorothiazide (MICROZIDE) 12.5 MG capsule Take 1 capsule by mouth Every Morning.   • meloxicam (MOBIC) 15 MG tablet TAKE 1 TABLET BY MOUTH EVERY DAY WITH FOOD   • metoprolol succinate XL (TOPROL-XL) 50 MG 24 hr tablet Take 1 tablet by mouth Daily.   • Probiotic Product (ALIGN PO) Take  by mouth.   • simvastatin (ZOCOR) 40 MG tablet Take 1 tablet by mouth Every Night.   • TURMERIC PO Take 2,000 mg by mouth.   • VITAMIN B COMPLEX-C PO Take  by mouth.   • nabumetone (RELAFEN) 500 MG tablet Take 500 mg by mouth Daily.     No current facility-administered medications on file prior to visit.      Current Medications:  Scheduled Meds:  Continuous Infusions:  No current facility-administered medications for this visit.   PRN Meds:.    Past Medical History:   Diagnosis Date   • Arthritis    • High cholesterol    • Hypertension         Past Surgical History:   Procedure Laterality  "Date   • BREAST BIOPSY     • HYSTERECTOMY     • JOINT REPLACEMENT      RT KNEE   • KNEE SURGERY     • TOTAL KNEE ARTHROPLASTY Left 8/2/2017    Procedure: TOTAL KNEE ARTHROPLASTY WITH NITIN NAVIGATION;  Surgeon: Rui Craft MD;  Location: [facility];  Service:         Social History     Occupational History   • Occupation: retired    Tobacco Use   • Smoking status: Never Smoker   • Smokeless tobacco: Never Used   Substance and Sexual Activity   • Alcohol use: No   • Drug use: No   • Sexual activity: Not on file      Social History     Social History Narrative   • Not on file        Family History   Problem Relation Age of Onset   • Hypertension Other    • Cancer Other    • Diabetes Other    • No Known Problems Mother    • No Known Problems Father    • No Known Problems Sister    • No Known Problems Brother    • No Known Problems Daughter    • No Known Problems Son    • No Known Problems Maternal Grandmother    • No Known Problems Paternal Grandmother    • No Known Problems Maternal Aunt    • No Known Problems Paternal Aunt    • BRCA 1/2 Neg Hx    • Breast cancer Neg Hx    • Colon cancer Neg Hx    • Endometrial cancer Neg Hx    • Ovarian cancer Neg Hx    • Malig Hyperthermia Neg Hx              Review of Systems: 14 point review of systems are remarkable for the pertinent positives listed in the chart most of these are knee she has had some eye issues    Review of Systems      Physical Exam: 79 y.o. female  General Appearance:    Alert, cooperative, in no acute distress                   Vitals:    02/14/19 1525   Temp: 98.6 °F (37 °C)   Weight: 95.3 kg (210 lb)   Height: 157.5 cm (62\")      Patient is alert and read ×3 no acute distress appears her above-listed at height weight and age.  Affect is normal respiratory rate is normal unlabored. Heart rate regular rate rhythm, sclera, dentition and hearing are normal for the purpose of this exam.    Ortho Exam Physical exam of the " right shoulder reveals no overlying skin changes no lymphedema no lymphadenopathy.  Patient has active flexion 180 with mild symptoms abduction is similar external rotation is to 50 and internal rotation to the upper lumbar spine with mild symptoms.  Patient has good rotator cuff strength 4+ over 5 with isometric strength testing with pain.  Patient has a positive impingement and a positive Reno sign.  Patient has good cervical range of motion which is full and asymptomatic no radicular symptoms.  Patient has a normal elbow exam.  Good distal pulses are present  Patient has pain with overhead activity and a positive Neer sign and a positive empty can sign , a positive drop arm and a definitive painful arc    Large Joint Arthrocentesis: R subacromial bursa  Date/Time: 2/14/2019 3:53 PM  Consent given by: patient  Site marked: site marked  Timeout: Immediately prior to procedure a time out was called to verify the correct patient, procedure, equipment, support staff and site/side marked as required   Supporting Documentation  Indications: pain   Procedure Details  Location: shoulder - R subacromial bursa  Preparation: Patient was prepped and draped in the usual sterile fashion  Needle size: 22 G  Approach: posterior  Medications administered: 80 mg methylPREDNISolone acetate 80 MG/ML; 4 mL lidocaine PF 1% 1 %  Patient tolerance: patient tolerated the procedure well with no immediate complications                I did review AP scapular Y and actually lateral x-rays from her April visit she has some acromioclavicular arthritis and mild sclerosis at the insertion of the cuff no acute pathology I did not repeat these today  Assessment/Plan: Right shoulder pain I still think this is rotator cuff she does have some weakness in the cuff if she fails to improve with the injection we will pursue other means of testing.  View of the fact this visit required history evaluation of prior x-rays and new exam I do think an  office visit charges warranted

## 2019-04-01 ENCOUNTER — OFFICE VISIT (OUTPATIENT)
Dept: INTERNAL MEDICINE | Facility: CLINIC | Age: 79
End: 2019-04-01

## 2019-04-01 VITALS
BODY MASS INDEX: 37.76 KG/M2 | WEIGHT: 205.2 LBS | TEMPERATURE: 97.4 F | OXYGEN SATURATION: 95 % | RESPIRATION RATE: 16 BRPM | HEIGHT: 62 IN | SYSTOLIC BLOOD PRESSURE: 128 MMHG | DIASTOLIC BLOOD PRESSURE: 82 MMHG | HEART RATE: 102 BPM

## 2019-04-01 DIAGNOSIS — J01.00 ACUTE MAXILLARY SINUSITIS, RECURRENCE NOT SPECIFIED: Primary | ICD-10-CM

## 2019-04-01 PROCEDURE — 99213 OFFICE O/P EST LOW 20 MIN: CPT | Performed by: NURSE PRACTITIONER

## 2019-04-01 RX ORDER — BENZONATATE 100 MG/1
100 CAPSULE ORAL 3 TIMES DAILY PRN
Qty: 42 CAPSULE | Refills: 0 | Status: SHIPPED | OUTPATIENT
Start: 2019-04-01 | End: 2019-07-09

## 2019-04-01 RX ORDER — AZITHROMYCIN 250 MG/1
TABLET, FILM COATED ORAL
Qty: 6 TABLET | Refills: 0 | Status: SHIPPED | OUTPATIENT
Start: 2019-04-01 | End: 2019-04-29 | Stop reason: SDUPTHER

## 2019-04-01 RX ORDER — DEXTROMETHORPHAN HYDROBROMIDE AND PROMETHAZINE HYDROCHLORIDE 15; 6.25 MG/5ML; MG/5ML
2.5 SYRUP ORAL 4 TIMES DAILY PRN
Qty: 180 ML | Refills: 0 | Status: SHIPPED | OUTPATIENT
Start: 2019-04-01 | End: 2019-04-29 | Stop reason: SDUPTHER

## 2019-04-01 RX ORDER — METHYLPREDNISOLONE 4 MG/1
TABLET ORAL
Qty: 21 TABLET | Refills: 0 | Status: SHIPPED | OUTPATIENT
Start: 2019-04-01 | End: 2019-04-29 | Stop reason: SDUPTHER

## 2019-04-01 RX ORDER — MELOXICAM 15 MG/1
TABLET ORAL
Qty: 90 TABLET | Refills: 3 | Status: SHIPPED | OUTPATIENT
Start: 2019-04-01 | End: 2020-04-07 | Stop reason: SDUPTHER

## 2019-04-01 NOTE — TELEPHONE ENCOUNTER
If this medication is working well, ask PCP to take over on the prescription as they do yearly lab monitoring.

## 2019-04-01 NOTE — PATIENT INSTRUCTIONS
Please purchase Claritin (loratidine) and take one tablet nightly     Take the steroid earlier in the day to avoid sleep disturbance

## 2019-04-29 ENCOUNTER — OFFICE VISIT (OUTPATIENT)
Dept: INTERNAL MEDICINE | Facility: CLINIC | Age: 79
End: 2019-04-29

## 2019-04-29 VITALS
WEIGHT: 207 LBS | DIASTOLIC BLOOD PRESSURE: 82 MMHG | BODY MASS INDEX: 38.09 KG/M2 | SYSTOLIC BLOOD PRESSURE: 133 MMHG | HEIGHT: 62 IN | TEMPERATURE: 98 F | OXYGEN SATURATION: 95 % | HEART RATE: 84 BPM | RESPIRATION RATE: 17 BRPM

## 2019-04-29 DIAGNOSIS — R06.2 WHEEZING: ICD-10-CM

## 2019-04-29 DIAGNOSIS — J40 BRONCHITIS: Primary | ICD-10-CM

## 2019-04-29 DIAGNOSIS — R05.9 COUGH: ICD-10-CM

## 2019-04-29 PROCEDURE — 99213 OFFICE O/P EST LOW 20 MIN: CPT | Performed by: NURSE PRACTITIONER

## 2019-04-29 RX ORDER — ALBUTEROL SULFATE 90 UG/1
2 AEROSOL, METERED RESPIRATORY (INHALATION) EVERY 4 HOURS PRN
Qty: 1 INHALER | Refills: 1 | Status: SHIPPED | OUTPATIENT
Start: 2019-04-29 | End: 2020-10-30

## 2019-04-29 RX ORDER — DOXYCYCLINE HYCLATE 100 MG/1
100 CAPSULE ORAL 2 TIMES DAILY
Qty: 20 CAPSULE | Refills: 0 | Status: SHIPPED | OUTPATIENT
Start: 2019-04-29 | End: 2019-05-09

## 2019-04-29 RX ORDER — METHYLPREDNISOLONE 4 MG/1
TABLET ORAL
Qty: 21 TABLET | Refills: 0 | Status: SHIPPED | OUTPATIENT
Start: 2019-04-29 | End: 2019-06-05

## 2019-04-29 NOTE — PROGRESS NOTES
"Subjective   Loulou Collier is a 79 y.o. female.   Chief Complaint   Patient presents with   • Follow-up     Bronchitis       Patient presents for evaluation of continued cough and mucus production, as well as wheezing.  This is a 79-year-old female patient of Dr. mcnair with history of hypertension hyperlipidemia.  On 4/1/2019 she was seen by me and diagnosed with acute sinusitis and treated with a Medrol Dosepak and Z-Jamil, as well as Tessalon Perles and Promethazine DM.  She has also been using Claritin consistently and Flonase.  She states that she started to feel better, but over the past 1 to 2 weeks has developed a \"even deeper cough\" with production of thick green mucus.  She also states that she is wheezing and having some shortness of breath or wheezing as bad.  She denies a history of cigarette smoking or asthma, COPD.  She denies fever, chills, chest discomfort.  She states that she finished the Medrol and Z-Jamil and full.  She denies development of any other new issues today.         The following portions of the patient's history were reviewed and updated as appropriate: allergies, current medications, past family history, past medical history, past social history, past surgical history and problem list.    Review of Systems   Constitutional: Positive for fatigue. Negative for activity change, chills, fever, unexpected weight gain and unexpected weight loss.   HENT: Positive for congestion, postnasal drip, rhinorrhea, sinus pressure and sneezing. Negative for hearing loss, sore throat and tinnitus.    Eyes: Negative for photophobia, pain and visual disturbance.   Respiratory: Positive for cough, shortness of breath and wheezing. Negative for chest tightness.    Cardiovascular: Negative for chest pain, palpitations and leg swelling.   Gastrointestinal: Negative for abdominal distention, abdominal pain, constipation, diarrhea, nausea and vomiting.   Endocrine: Negative for polydipsia, polyphagia and polyuria. " "  Genitourinary: Negative for dysuria, frequency, hematuria and urgency.   Neurological: Negative for dizziness, weakness, numbness and headache.   All other systems reviewed and are negative.      Objective    /82 (BP Location: Left arm, Patient Position: Sitting, Cuff Size: Large Adult)   Pulse 84   Temp 98 °F (36.7 °C) (Oral)   Resp 17   Ht 157.5 cm (62\")   Wt 93.9 kg (207 lb)   SpO2 95%   BMI 37.86 kg/m²     Physical Exam   Constitutional: She is oriented to person, place, and time. She appears well-developed and well-nourished. No distress.   HENT:   Head: Normocephalic and atraumatic.   Right Ear: External ear normal.   Left Ear: External ear normal.   Nose: Nose normal.   Mouth/Throat: Oropharynx is clear and moist. No oropharyngeal exudate.   Eyes: Conjunctivae and EOM are normal. Pupils are equal, round, and reactive to light. Right eye exhibits no discharge. Left eye exhibits no discharge.   Neck: Normal range of motion. Neck supple. No JVD present. No tracheal deviation present. No thyromegaly present.   Cardiovascular: Normal rate, regular rhythm, normal heart sounds and intact distal pulses. Exam reveals no gallop and no friction rub.   No murmur heard.  Pulmonary/Chest: Effort normal. No stridor. No respiratory distress. She has wheezes in the right upper field, the right lower field and the left upper field. She has no rales. She exhibits no tenderness.   Abdominal: Soft. Bowel sounds are normal.   Musculoskeletal: Normal range of motion.   Lymphadenopathy:     She has no cervical adenopathy.   Neurological: She is alert and oriented to person, place, and time.   Skin: Skin is warm and dry. Capillary refill takes less than 2 seconds. She is not diaphoretic.   Psychiatric: She has a normal mood and affect. Her behavior is normal. Judgment and thought content normal.   Nursing note and vitals reviewed.    Current outpatient and discharge medications have been reconciled for the " patient.  Reviewed by: DARON Bradley      Assessment/Plan   Loulou was seen today for follow-up.    Diagnoses and all orders for this visit:    Bronchitis  -     XR Chest 2 View  -     albuterol sulfate  (90 Base) MCG/ACT inhaler; Inhale 2 puffs Every 4 (Four) Hours As Needed for Wheezing.  -     methylPREDNISolone (MEDROL, JUN,) 4 MG tablet; Take as directed on package instructions.  -     CBC & Differential  -     Comprehensive metabolic panel  -     doxycycline (VIBRAMYCIN) 100 MG capsule; Take 1 capsule by mouth 2 (Two) Times a Day for 10 days.    Wheezing    Cough    -Bronchitis: We will treat with doxycycline 100 mg twice daily x10 days as well as a Medrol Dosepak and albuterol inhaler.  Continue the Claritin.  We will also get a CBC and CMP, as well as chest x-ray as her symptoms have been present x1 month.    -We will contact patient with results of her imaging and labs and any further recommendations.  Follow-up if symptoms persist or worsen and routinely with PCP, Dr. mcnair.

## 2019-04-30 ENCOUNTER — HOSPITAL ENCOUNTER (OUTPATIENT)
Dept: GENERAL RADIOLOGY | Facility: HOSPITAL | Age: 79
Discharge: HOME | End: 2019-04-30
Admitting: NURSE PRACTITIONER

## 2019-04-30 LAB
ALBUMIN SERPL-MCNC: 4.1 G/DL (ref 3.5–5.2)
ALBUMIN/GLOB SERPL: 1.6 G/DL
ALP SERPL-CCNC: 65 U/L (ref 39–117)
ALT SERPL-CCNC: 15 U/L (ref 1–33)
AST SERPL-CCNC: 15 U/L (ref 1–32)
BASOPHILS # BLD AUTO: 0.02 10*3/MM3 (ref 0–0.2)
BASOPHILS NFR BLD AUTO: 0.5 % (ref 0–1.5)
BILIRUB SERPL-MCNC: 0.3 MG/DL (ref 0.2–1.2)
BUN SERPL-MCNC: 20 MG/DL (ref 8–23)
BUN/CREAT SERPL: 23.5 (ref 7–25)
CALCIUM SERPL-MCNC: 10 MG/DL (ref 8.6–10.5)
CHLORIDE SERPL-SCNC: 103 MMOL/L (ref 98–107)
CO2 SERPL-SCNC: 28.9 MMOL/L (ref 22–29)
CREAT SERPL-MCNC: 0.85 MG/DL (ref 0.57–1)
EOSINOPHIL # BLD AUTO: 0.4 10*3/MM3 (ref 0–0.4)
EOSINOPHIL NFR BLD AUTO: 9.3 % (ref 0.3–6.2)
ERYTHROCYTE [DISTWIDTH] IN BLOOD BY AUTOMATED COUNT: 17.1 % (ref 12.3–15.4)
GLOBULIN SER CALC-MCNC: 2.5 GM/DL
GLUCOSE SERPL-MCNC: 90 MG/DL (ref 65–99)
HCT VFR BLD AUTO: 40.5 % (ref 34–46.6)
HGB BLD-MCNC: 12.5 G/DL (ref 12–15.9)
IMM GRANULOCYTES # BLD AUTO: 0.01 10*3/MM3 (ref 0–0.05)
IMM GRANULOCYTES NFR BLD AUTO: 0.2 % (ref 0–0.5)
LYMPHOCYTES # BLD AUTO: 1.3 10*3/MM3 (ref 0.7–3.1)
LYMPHOCYTES NFR BLD AUTO: 30.1 % (ref 19.6–45.3)
MCH RBC QN AUTO: 27.5 PG (ref 26.6–33)
MCHC RBC AUTO-ENTMCNC: 30.9 G/DL (ref 31.5–35.7)
MCV RBC AUTO: 89.2 FL (ref 79–97)
MONOCYTES # BLD AUTO: 0.29 10*3/MM3 (ref 0.1–0.9)
MONOCYTES NFR BLD AUTO: 6.7 % (ref 5–12)
NEUTROPHILS # BLD AUTO: 2.3 10*3/MM3 (ref 1.7–7)
NEUTROPHILS NFR BLD AUTO: 53.2 % (ref 42.7–76)
NRBC BLD AUTO-RTO: 0 /100 WBC (ref 0–0.2)
PLATELET # BLD AUTO: 200 10*3/MM3 (ref 140–450)
POTASSIUM SERPL-SCNC: 4 MMOL/L (ref 3.5–5.2)
PROT SERPL-MCNC: 6.6 G/DL (ref 6–8.5)
RBC # BLD AUTO: 4.54 10*6/MM3 (ref 3.77–5.28)
SODIUM SERPL-SCNC: 141 MMOL/L (ref 136–145)
WBC # BLD AUTO: 4.32 10*3/MM3 (ref 3.4–10.8)

## 2019-04-30 PROCEDURE — 71046 X-RAY EXAM CHEST 2 VIEWS: CPT

## 2019-04-30 NOTE — PROGRESS NOTES
Please let patient know that her CBC and metabolic panel came back stable. I will get her the chest xray results as soon as they are back.

## 2019-06-05 ENCOUNTER — OFFICE VISIT (OUTPATIENT)
Dept: INTERNAL MEDICINE | Facility: CLINIC | Age: 79
End: 2019-06-05

## 2019-06-05 VITALS
SYSTOLIC BLOOD PRESSURE: 164 MMHG | DIASTOLIC BLOOD PRESSURE: 96 MMHG | HEART RATE: 111 BPM | OXYGEN SATURATION: 90 % | WEIGHT: 204 LBS | BODY MASS INDEX: 37.31 KG/M2 | TEMPERATURE: 97.4 F

## 2019-06-05 DIAGNOSIS — D50.0 IRON DEFICIENCY ANEMIA DUE TO CHRONIC BLOOD LOSS: ICD-10-CM

## 2019-06-05 DIAGNOSIS — J45.20 INTERMITTENT ASTHMA, UNSPECIFIED ASTHMA SEVERITY, UNSPECIFIED WHETHER COMPLICATED: Primary | ICD-10-CM

## 2019-06-05 DIAGNOSIS — I10 BENIGN ESSENTIAL HTN: ICD-10-CM

## 2019-06-05 PROCEDURE — 99214 OFFICE O/P EST MOD 30 MIN: CPT | Performed by: FAMILY MEDICINE

## 2019-06-05 RX ORDER — CLARITHROMYCIN 500 MG/1
500 TABLET, COATED ORAL 2 TIMES DAILY
Qty: 14 TABLET | Refills: 0 | Status: SHIPPED | OUTPATIENT
Start: 2019-06-05 | End: 2019-07-09

## 2019-06-05 RX ORDER — PREDNISONE 20 MG/1
TABLET ORAL
Qty: 14 TABLET | Refills: 0 | Status: SHIPPED | OUTPATIENT
Start: 2019-06-05 | End: 2019-06-17

## 2019-06-05 RX ORDER — CARVEDILOL 25 MG/1
25 TABLET ORAL 2 TIMES DAILY WITH MEALS
Qty: 60 TABLET | Refills: 2 | Status: SHIPPED | OUTPATIENT
Start: 2019-06-05 | End: 2019-09-19 | Stop reason: SDUPTHER

## 2019-06-05 NOTE — PROGRESS NOTES
Subjective   Loulou Collier is a 79 y.o. female.     Chief Complaint   Patient presents with   • Hypertension   • Anemia   • Wheezing         History of Present Illness   Patient has been persistently and repetitively wheezing for the past 2 to 3 months.  She has been treated twice with a Medrol dose pack at one point and also a Z-Jamil.  She uses her rescue inhaler albuterol.  She appears to be developing asthma she has no history of smoking.  She has continuous wheezing without respiratory distress other O2 sat was 90% on room air and heart rate was 111.  She does not have any history of asthma previously.  Otherwise we discussed the fact that metoprolol may affect the inhaler usage and effectiveness.  She has had a history of an allergy to lisinopril.  Time discussing impact of asthma and treatment 45 minutes.      The following portions of the patient's history were reviewed and updated as appropriate: allergies, current medications, past social history and problem list.    Review of Systems   Constitutional: Negative.    HENT: Negative.    Eyes: Negative.    Respiratory: Positive for cough, shortness of breath and wheezing.    Cardiovascular: Negative.    Gastrointestinal: Negative.    Endocrine: Negative.    Genitourinary: Negative.    Musculoskeletal: Negative.    Skin: Negative.    Allergic/Immunologic: Negative.    Neurological: Negative.    Hematological: Negative.    Psychiatric/Behavioral: Negative.        Objective   Vitals:    06/05/19 0904   BP: 164/96   Pulse: 111   Temp: 97.4 °F (36.3 °C)   SpO2: 90%     Physical Exam   Constitutional: She is oriented to person, place, and time. She appears well-developed and well-nourished.   HENT:   Head: Normocephalic and atraumatic.   Right Ear: Tympanic membrane and external ear normal.   Left Ear: Tympanic membrane and external ear normal.   Nose: Nose normal.   Mouth/Throat: Oropharynx is clear and moist.   Eyes: Conjunctivae and EOM are normal. Pupils are  equal, round, and reactive to light.   Neck: Normal range of motion. Neck supple. No JVD present. No thyromegaly present.   Cardiovascular: Regular rhythm, normal heart sounds and intact distal pulses. Tachycardia present.   Pulmonary/Chest: Effort normal. She has wheezes in the right upper field, the right middle field, the right lower field, the left upper field, the left middle field and the left lower field.   Abdominal: Soft. Bowel sounds are normal.   Musculoskeletal: Normal range of motion.   Lymphadenopathy:     She has no cervical adenopathy.   Neurological: She is alert and oriented to person, place, and time. No cranial nerve deficit. Coordination normal.   Skin: Skin is warm and dry. No rash noted.   Psychiatric: She has a normal mood and affect. Her behavior is normal. Judgment and thought content normal.   Vitals reviewed.      Assessment/Plan   Problem List Items Addressed This Visit        Cardiovascular and Mediastinum    Benign essential HTN    Relevant Medications    carvedilol (COREG) 25 MG tablet       Hematopoietic and Hemostatic    Iron deficiency anemia due to chronic blood loss      Other Visit Diagnoses     Intermittent asthma, unspecified asthma severity, unspecified whether complicated    -  Primary    Relevant Medications    Fluticasone Furoate-Vilanterol (BREO ELLIPTA) 100-25 MCG/INH inhaler    Other Relevant Orders    Full Pulmonary Function Test With Bronchodilator    Ambulatory Referral to Pulmonology      Plan: Discontinue metoprolol start carvedilol 25 mg twice daily.  Hold iron for now hold simvastatin while taking antibiotic clarithromycin 500 twice daily #14.  Taper dose prednisone over 10 days.  Samples of Brio 1 puff daily.  Continue albuterol inhaler 2 puffs every 3-4 as needed.  Schedule pulmonary function test with bronchodilator.  Urgent referral to pulmonary for evaluation.  Referral to ER for any increased wheezing or respiratory distress.

## 2019-06-06 ENCOUNTER — HOSPITAL ENCOUNTER (OUTPATIENT)
Dept: RESPIRATORY THERAPY | Facility: HOSPITAL | Age: 79
Discharge: HOME | End: 2019-06-06
Admitting: FAMILY MEDICINE

## 2019-06-06 DIAGNOSIS — J45.20 INTERMITTENT ASTHMA, UNSPECIFIED ASTHMA SEVERITY, UNSPECIFIED WHETHER COMPLICATED: ICD-10-CM

## 2019-06-06 LAB
BDY SITE: NORMAL
HGB BLDA-MCNC: 14.6 G/DL

## 2019-06-06 PROCEDURE — 82820 HEMOGLOBIN-OXYGEN AFFINITY: CPT | Performed by: FAMILY MEDICINE

## 2019-06-06 PROCEDURE — 94060 EVALUATION OF WHEEZING: CPT

## 2019-06-06 PROCEDURE — 94729 DIFFUSING CAPACITY: CPT

## 2019-06-06 PROCEDURE — 94640 AIRWAY INHALATION TREATMENT: CPT

## 2019-06-06 PROCEDURE — 94726 PLETHYSMOGRAPHY LUNG VOLUMES: CPT

## 2019-06-06 RX ORDER — ALBUTEROL SULFATE 2.5 MG/3ML
2.5 SOLUTION RESPIRATORY (INHALATION) ONCE
Status: COMPLETED | OUTPATIENT
Start: 2019-06-06 | End: 2019-06-06

## 2019-06-06 RX ADMIN — ALBUTEROL SULFATE 2.5 MG: 2.5 SOLUTION RESPIRATORY (INHALATION) at 09:28

## 2019-07-01 ENCOUNTER — TRANSCRIBE ORDERS (OUTPATIENT)
Dept: ADMINISTRATIVE | Facility: HOSPITAL | Age: 79
End: 2019-07-01

## 2019-07-01 DIAGNOSIS — J84.9 ILD (INTERSTITIAL LUNG DISEASE) (HCC): Primary | ICD-10-CM

## 2019-07-09 ENCOUNTER — OFFICE VISIT (OUTPATIENT)
Dept: INTERNAL MEDICINE | Facility: CLINIC | Age: 79
End: 2019-07-09

## 2019-07-09 VITALS
TEMPERATURE: 98.4 F | DIASTOLIC BLOOD PRESSURE: 76 MMHG | HEART RATE: 82 BPM | SYSTOLIC BLOOD PRESSURE: 126 MMHG | WEIGHT: 215 LBS | OXYGEN SATURATION: 93 % | BODY MASS INDEX: 39.32 KG/M2

## 2019-07-09 DIAGNOSIS — J45.20 MILD INTERMITTENT REACTIVE AIRWAY DISEASE WITHOUT COMPLICATION: ICD-10-CM

## 2019-07-09 DIAGNOSIS — J45.909 ASTHMA DUE TO ENVIRONMENTAL ALLERGIES: ICD-10-CM

## 2019-07-09 DIAGNOSIS — I10 BENIGN ESSENTIAL HTN: Primary | ICD-10-CM

## 2019-07-09 PROCEDURE — 99213 OFFICE O/P EST LOW 20 MIN: CPT | Performed by: FAMILY MEDICINE

## 2019-07-09 NOTE — PROGRESS NOTES
Subjective   Loulou Collier is a 79 y.o. female.     Chief Complaint   Patient presents with   • Asthma   • Allergies   • Hypertension         History of Present Illness   Patient's blood pressure is doing much better.    She has gained some weight perhaps relating to previous treatment for reactive airway disease which seems post viral.  There is a component of allergic asthma.  Pulmonary work-up is reviewed with her and she is breathing better.  She is on Breo 100 daily.  She has not required rescue inhaler recently.      The following portions of the patient's history were reviewed and updated as appropriate: allergies, current medications, past social history and problem list.    Review of Systems   Constitutional: Positive for unexpected weight change.   HENT: Negative.    Eyes: Negative.    Respiratory: Negative.    Cardiovascular: Negative.    Gastrointestinal: Negative.    Endocrine: Negative.    Genitourinary: Negative.    Musculoskeletal: Negative.    Skin: Negative.    Allergic/Immunologic: Negative.    Neurological: Negative.    Hematological: Negative.    Psychiatric/Behavioral: Negative.        Objective   Vitals:    07/09/19 1441   BP: 126/76   Pulse: 82   Temp: 98.4 °F (36.9 °C)   SpO2: 93%     Physical Exam   Constitutional: She is oriented to person, place, and time. She appears well-developed and well-nourished.   HENT:   Head: Normocephalic and atraumatic.   Right Ear: Tympanic membrane and external ear normal.   Left Ear: Tympanic membrane and external ear normal.   Nose: Nose normal.   Mouth/Throat: Oropharynx is clear and moist.   Eyes: Conjunctivae and EOM are normal. Pupils are equal, round, and reactive to light.   Neck: Normal range of motion. Neck supple. No JVD present. No thyromegaly present.   Cardiovascular: Normal rate, regular rhythm, normal heart sounds and intact distal pulses.   Pulmonary/Chest: Effort normal and breath sounds normal.   Abdominal: Soft. Bowel sounds are normal.    Musculoskeletal: Normal range of motion.   Lymphadenopathy:     She has no cervical adenopathy.   Neurological: She is alert and oriented to person, place, and time. No cranial nerve deficit. Coordination normal.   Skin: Skin is warm and dry. No rash noted.   Psychiatric: She has a normal mood and affect. Her behavior is normal. Judgment and thought content normal.   Vitals reviewed.      Assessment/Plan   Problem List Items Addressed This Visit        Cardiovascular and Mediastinum    Benign essential HTN - Primary      Other Visit Diagnoses     Mild intermittent reactive airway disease without complication        Asthma due to environmental allergies          Continue current treatment with follow-up.  She may use over-the-counter nonsedating antihistamines if desired.  Follow-up CT with pulmonary.

## 2019-08-01 ENCOUNTER — HOSPITAL ENCOUNTER (OUTPATIENT)
Dept: CT IMAGING | Facility: HOSPITAL | Age: 79
Discharge: HOME | End: 2019-08-01
Admitting: INTERNAL MEDICINE

## 2019-08-01 DIAGNOSIS — J84.9 ILD (INTERSTITIAL LUNG DISEASE) (HCC): ICD-10-CM

## 2019-08-01 PROCEDURE — 71250 CT THORAX DX C-: CPT

## 2019-08-05 ENCOUNTER — OFFICE VISIT (OUTPATIENT)
Dept: NEUROSURGERY | Facility: CLINIC | Age: 79
End: 2019-08-05

## 2019-08-05 VITALS
DIASTOLIC BLOOD PRESSURE: 70 MMHG | RESPIRATION RATE: 18 BRPM | SYSTOLIC BLOOD PRESSURE: 102 MMHG | BODY MASS INDEX: 38.64 KG/M2 | HEIGHT: 62 IN | HEART RATE: 74 BPM | WEIGHT: 210 LBS

## 2019-08-05 DIAGNOSIS — M24.80 CERVICAL SPINE CREPITUS: Primary | ICD-10-CM

## 2019-08-05 PROCEDURE — 99214 OFFICE O/P EST MOD 30 MIN: CPT | Performed by: NEUROLOGICAL SURGERY

## 2019-08-05 NOTE — PROGRESS NOTES
Subjective   Patient ID: Loulou Collier is a 79 y.o. female is here today for follow-up neck pain. Patient presents unaccompanied.     History of Present Illness  Patient reports doing better w/r/t her shoulder.  No falls or weakness.  No new numbness.  No loss of b/b.      The following portions of the patient's history were reviewed and updated as appropriate: allergies, current medications, past family history, past medical history, past social history, past surgical history and problem list.    Review of Systems   Genitourinary: Negative for difficulty urinating and enuresis.   Musculoskeletal: Negative for arthralgias (denies arm pain), neck pain and neck stiffness.   Neurological: Negative for weakness and numbness.   Psychiatric/Behavioral: Negative for sleep disturbance.       Objective   Physical Exam  Neurologic Exam     Motor Exam   Muscle bulk: normal     Strength   Right deltoid: 5/5  Left deltoid: 5/5  Right biceps: 5/5  Left biceps: 5/5  Right triceps: 5/5  Left triceps: 5/5  Right wrist extension: 5/5  Left wrist extension: 5/5  Right interossei: 5/5  Left interossei: 5/5  Right iliopsoas: 5/5  Left iliopsoas: 5/5  Right quadriceps: 5/5  Left quadriceps: 5/5  Right anterior tibial: 5/5  Left anterior tibial: 5/5  Right gastroc: 5/5  Left gastroc: 5/5     Sensory Exam   Right arm light touch: normal  Left arm light touch: normal  Right leg light touch: normal  Left leg light touch: normal  Right arm pinprick: normal  Left arm pinprick: normal  Right leg pinprick: normal  Left leg pinprick: normal     Gait, Coordination, and Reflexes      Reflexes   Right brachioradialis: 1+  Left brachioradialis: 1+  Right biceps: 1+  Left biceps: 1+  Right triceps: 1+  Left triceps: 1+  Right patellar: 1+  Left patellar: 1+  Right achilles: 1+  Left achilles: 1+  Right Osuna: absent  Left Osuna: absent  Right ankle clonus: absent  Left ankle clonus: absent     Romberg neg    Assessment/Plan   Independent Review  of Radiographic Studies:    Reversal of lordosis is noted.  She has acquired stenosis with cord contouring.  Medical Decision Making:  No myelopathy has developed in the interim.  I will check her in 6 months.  We will reimage her for any new issues.  We discussed red flags etc...    Loulou was seen today for neck pain.    Diagnoses and all orders for this visit:    Cervical spine crepitus      No Follow-up on file.

## 2019-08-08 ENCOUNTER — TRANSCRIBE ORDERS (OUTPATIENT)
Dept: ADMINISTRATIVE | Facility: HOSPITAL | Age: 79
End: 2019-08-08

## 2019-08-08 DIAGNOSIS — I10 HYPERTENSION, UNSPECIFIED TYPE: Primary | ICD-10-CM

## 2019-08-15 ENCOUNTER — HOSPITAL ENCOUNTER (OUTPATIENT)
Dept: CARDIOLOGY | Facility: HOSPITAL | Age: 79
Discharge: HOME | End: 2019-08-15
Admitting: INTERNAL MEDICINE

## 2019-08-15 VITALS
BODY MASS INDEX: 38.64 KG/M2 | HEIGHT: 62 IN | SYSTOLIC BLOOD PRESSURE: 140 MMHG | OXYGEN SATURATION: 96 % | HEART RATE: 80 BPM | DIASTOLIC BLOOD PRESSURE: 78 MMHG | WEIGHT: 210 LBS

## 2019-08-15 DIAGNOSIS — I10 HYPERTENSION, UNSPECIFIED TYPE: ICD-10-CM

## 2019-08-15 LAB
AORTIC DIMENSIONLESS INDEX: 0.7 (DI)
BH CV ECHO MEAS - ACS: 1.5 CM
BH CV ECHO MEAS - AO MAX PG (FULL): 2.5 MMHG
BH CV ECHO MEAS - AO MAX PG: 6.7 MMHG
BH CV ECHO MEAS - AO MEAN PG (FULL): 1 MMHG
BH CV ECHO MEAS - AO MEAN PG: 3 MMHG
BH CV ECHO MEAS - AO ROOT AREA (BSA CORRECTED): 1.9
BH CV ECHO MEAS - AO ROOT AREA: 11.3 CM^2
BH CV ECHO MEAS - AO ROOT DIAM: 3.8 CM
BH CV ECHO MEAS - AO V2 MAX: 129 CM/SEC
BH CV ECHO MEAS - AO V2 MEAN: 84.1 CM/SEC
BH CV ECHO MEAS - AO V2 VTI: 32.7 CM
BH CV ECHO MEAS - ASC AORTA: 4.2 CM
BH CV ECHO MEAS - AVA(I,A): 2.6 CM^2
BH CV ECHO MEAS - AVA(I,D): 2.6 CM^2
BH CV ECHO MEAS - AVA(V,A): 2.7 CM^2
BH CV ECHO MEAS - AVA(V,D): 2.7 CM^2
BH CV ECHO MEAS - BSA(HAYCOCK): 2.1 M^2
BH CV ECHO MEAS - BSA: 2 M^2
BH CV ECHO MEAS - BZI_BMI: 38.4 KILOGRAMS/M^2
BH CV ECHO MEAS - BZI_METRIC_HEIGHT: 157.5 CM
BH CV ECHO MEAS - BZI_METRIC_WEIGHT: 95.3 KG
BH CV ECHO MEAS - EDV(CUBED): 148.9 ML
BH CV ECHO MEAS - EDV(MOD-SP2): 91 ML
BH CV ECHO MEAS - EDV(MOD-SP4): 72 ML
BH CV ECHO MEAS - EDV(TEICH): 135.3 ML
BH CV ECHO MEAS - EF(CUBED): 71.2 %
BH CV ECHO MEAS - EF(MOD-BP): 65 %
BH CV ECHO MEAS - EF(MOD-SP2): 67 %
BH CV ECHO MEAS - EF(MOD-SP4): 59.7 %
BH CV ECHO MEAS - EF(TEICH): 62.4 %
BH CV ECHO MEAS - ESV(CUBED): 42.9 ML
BH CV ECHO MEAS - ESV(MOD-SP2): 30 ML
BH CV ECHO MEAS - ESV(MOD-SP4): 29 ML
BH CV ECHO MEAS - ESV(TEICH): 50.9 ML
BH CV ECHO MEAS - FS: 34 %
BH CV ECHO MEAS - IVS/LVPW: 1.2
BH CV ECHO MEAS - IVSD: 1.1 CM
BH CV ECHO MEAS - LAT PEAK E' VEL: 4 CM/SEC
BH CV ECHO MEAS - LV DIASTOLIC VOL/BSA (35-75): 36.9 ML/M^2
BH CV ECHO MEAS - LV MASS(C)D: 200.4 GRAMS
BH CV ECHO MEAS - LV MASS(C)DI: 102.7 GRAMS/M^2
BH CV ECHO MEAS - LV MAX PG: 4.2 MMHG
BH CV ECHO MEAS - LV MEAN PG: 2 MMHG
BH CV ECHO MEAS - LV SYSTOLIC VOL/BSA (12-30): 14.9 ML/M^2
BH CV ECHO MEAS - LV V1 MAX: 102 CM/SEC
BH CV ECHO MEAS - LV V1 MEAN: 60.3 CM/SEC
BH CV ECHO MEAS - LV V1 VTI: 24.5 CM
BH CV ECHO MEAS - LVIDD: 5.3 CM
BH CV ECHO MEAS - LVIDS: 3.5 CM
BH CV ECHO MEAS - LVLD AP2: 7.5 CM
BH CV ECHO MEAS - LVLD AP4: 6.8 CM
BH CV ECHO MEAS - LVLS AP2: 6.3 CM
BH CV ECHO MEAS - LVLS AP4: 6.2 CM
BH CV ECHO MEAS - LVOT AREA (M): 3.5 CM^2
BH CV ECHO MEAS - LVOT AREA: 3.5 CM^2
BH CV ECHO MEAS - LVOT DIAM: 2.1 CM
BH CV ECHO MEAS - LVPWD: 0.9 CM
BH CV ECHO MEAS - MED PEAK E' VEL: 3 CM/SEC
BH CV ECHO MEAS - MV A DUR: 0.16 SEC
BH CV ECHO MEAS - MV A MAX VEL: 107 CM/SEC
BH CV ECHO MEAS - MV DEC SLOPE: 549 CM/SEC^2
BH CV ECHO MEAS - MV DEC TIME: 220 SEC
BH CV ECHO MEAS - MV E MAX VEL: 74.7 CM/SEC
BH CV ECHO MEAS - MV E/A: 0.7
BH CV ECHO MEAS - MV MAX PG: 5 MMHG
BH CV ECHO MEAS - MV MEAN PG: 2 MMHG
BH CV ECHO MEAS - MV P1/2T MAX VEL: 89 CM/SEC
BH CV ECHO MEAS - MV P1/2T: 47.5 MSEC
BH CV ECHO MEAS - MV V2 MAX: 112 CM/SEC
BH CV ECHO MEAS - MV V2 MEAN: 64.2 CM/SEC
BH CV ECHO MEAS - MV V2 VTI: 29.1 CM
BH CV ECHO MEAS - MVA P1/2T LCG: 2.5 CM^2
BH CV ECHO MEAS - MVA(P1/2T): 4.6 CM^2
BH CV ECHO MEAS - MVA(VTI): 2.9 CM^2
BH CV ECHO MEAS - PA ACC TIME: 0.09 SEC
BH CV ECHO MEAS - PA MAX PG (FULL): 3.7 MMHG
BH CV ECHO MEAS - PA MAX PG: 5.4 MMHG
BH CV ECHO MEAS - PA PR(ACCEL): 37.6 MMHG
BH CV ECHO MEAS - PA V2 MAX: 116 CM/SEC
BH CV ECHO MEAS - PULM A REVS DUR: 0.15 SEC
BH CV ECHO MEAS - PULM A REVS VEL: 42.7 CM/SEC
BH CV ECHO MEAS - PULM DIAS VEL: 53.8 CM/SEC
BH CV ECHO MEAS - PULM S/D: 1.2
BH CV ECHO MEAS - PULM SYS VEL: 66 CM/SEC
BH CV ECHO MEAS - PVA(V,A): 2.8 CM^2
BH CV ECHO MEAS - PVA(V,D): 2.8 CM^2
BH CV ECHO MEAS - QP/QS: 0.84
BH CV ECHO MEAS - RAP SYSTOLE: 3 MMHG
BH CV ECHO MEAS - RV MAX PG: 1.7 MMHG
BH CV ECHO MEAS - RV MEAN PG: 1 MMHG
BH CV ECHO MEAS - RV V1 MAX: 65.8 CM/SEC
BH CV ECHO MEAS - RV V1 MEAN: 42.8 CM/SEC
BH CV ECHO MEAS - RV V1 VTI: 14.6 CM
BH CV ECHO MEAS - RVOT AREA: 4.9 CM^2
BH CV ECHO MEAS - RVOT DIAM: 2.5 CM
BH CV ECHO MEAS - RVSP: 30 MMHG
BH CV ECHO MEAS - SI(AO): 190 ML/M^2
BH CV ECHO MEAS - SI(CUBED): 54.3 ML/M^2
BH CV ECHO MEAS - SI(LVOT): 43.5 ML/M^2
BH CV ECHO MEAS - SI(MOD-SP2): 31.3 ML/M^2
BH CV ECHO MEAS - SI(MOD-SP4): 22 ML/M^2
BH CV ECHO MEAS - SI(TEICH): 43.3 ML/M^2
BH CV ECHO MEAS - SV(AO): 370.9 ML
BH CV ECHO MEAS - SV(CUBED): 106 ML
BH CV ECHO MEAS - SV(LVOT): 84.9 ML
BH CV ECHO MEAS - SV(MOD-SP2): 61 ML
BH CV ECHO MEAS - SV(MOD-SP4): 43 ML
BH CV ECHO MEAS - SV(RVOT): 71.7 ML
BH CV ECHO MEAS - SV(TEICH): 84.5 ML
BH CV ECHO MEAS - TAPSE (>1.6): 2.2 CM2
BH CV ECHO MEAS - TR MAX VEL: 258 CM/SEC
BH CV ECHO MEASUREMENTS AVERAGE E/E' RATIO: 21.34
BH CV VAS BP RIGHT ARM: NORMAL MMHG
BH CV XLRA - RV BASE: 3.6 CM
BH CV XLRA - TDI S': 16 CM/SEC
LEFT ATRIUM VOLUME INDEX: 26 ML/M2
LEFT ATRIUM VOLUME: 50 CM3
MAXIMAL PREDICTED HEART RATE: 143 BPM
STRESS TARGET HR: 122 BPM

## 2019-08-15 PROCEDURE — 93306 TTE W/DOPPLER COMPLETE: CPT | Performed by: INTERNAL MEDICINE

## 2019-08-15 PROCEDURE — 93306 TTE W/DOPPLER COMPLETE: CPT

## 2019-09-06 ENCOUNTER — OFFICE VISIT (OUTPATIENT)
Dept: RETAIL CLINIC | Facility: CLINIC | Age: 79
End: 2019-09-06

## 2019-09-06 VITALS
OXYGEN SATURATION: 98 % | SYSTOLIC BLOOD PRESSURE: 128 MMHG | HEART RATE: 78 BPM | TEMPERATURE: 98 F | DIASTOLIC BLOOD PRESSURE: 64 MMHG

## 2019-09-06 DIAGNOSIS — J01.40 ACUTE PANSINUSITIS, RECURRENCE NOT SPECIFIED: Primary | ICD-10-CM

## 2019-09-06 PROCEDURE — 99213 OFFICE O/P EST LOW 20 MIN: CPT | Performed by: NURSE PRACTITIONER

## 2019-09-06 RX ORDER — PREDNISONE 20 MG/1
20 TABLET ORAL 2 TIMES DAILY
Qty: 14 TABLET | Refills: 0 | Status: SHIPPED | OUTPATIENT
Start: 2019-09-06 | End: 2019-09-13

## 2019-09-06 RX ORDER — DOXYCYCLINE 100 MG/1
100 CAPSULE ORAL 2 TIMES DAILY
Qty: 10 CAPSULE | Refills: 0 | Status: SHIPPED | OUTPATIENT
Start: 2019-09-06 | End: 2019-11-13

## 2019-09-06 RX ORDER — GUAIFENESIN 600 MG/1
600 TABLET, EXTENDED RELEASE ORAL 2 TIMES DAILY
Qty: 28 TABLET | Refills: 0 | Status: SHIPPED | OUTPATIENT
Start: 2019-09-06 | End: 2019-09-20

## 2019-09-06 NOTE — PROGRESS NOTES
Subjective:     Loulou Collier is a 79 y.o.     Sinusitis   Episode onset: started a week ago. Maximum temperature: unknown. Associated symptoms include congestion (mucous, white/yellow), coughing, sinus pressure and sneezing. Shortness of breath: minimal. Sore throat: starting. Treatments tried: antihistamine  The treatment provided mild relief.         The following portions of the patient's history were reviewed and updated as appropriate: allergies, current medications, past family history, past medical history, past social history, past surgical history and problem list.      Review of Systems   Constitutional: Fever: unknown.   HENT: Positive for congestion (mucous, white/yellow), postnasal drip, sinus pressure, sinus pain and sneezing. Sore throat: starting.    Respiratory: Positive for cough. Negative for wheezing. Shortness of breath: minimal.    Cardiovascular:        Hx:hypertension   Neurological: Dizziness: yesterday but took antihistamine and it improved.         Objective:      Physical Exam   Constitutional: She appears well-developed and well-nourished.   HENT:   Head: Normocephalic and atraumatic.   Right Ear: Ear canal normal. Right ear middle ear effusion: mild serous.   Left Ear: Ear canal normal. Left ear middle ear effusion: moderate, mucoid.   Nose: Rhinorrhea present. Right sinus exhibits maxillary sinus tenderness and frontal sinus tenderness. Left sinus exhibits maxillary sinus tenderness and frontal sinus tenderness.   Mouth/Throat: No oropharyngeal exudate or posterior oropharyngeal erythema.   Cardiovascular: Normal rate, regular rhythm, S1 normal, S2 normal and normal heart sounds.   Pulmonary/Chest: Effort normal and breath sounds normal.   Lymphadenopathy:     She has no cervical adenopathy.   Vitals reviewed.          Diagnoses and all orders for this visit:    Acute pansinusitis, recurrence not specified    Other orders  -     guaiFENesin (MUCINEX) 600 MG 12 hr tablet; Take 1  tablet by mouth 2 (Two) Times a Day for 14 days.  -     predniSONE (DELTASONE) 20 MG tablet; Take 1 tablet by mouth 2 (Two) Times a Day for 7 days.  -     doxycycline (MONODOX) 100 MG capsule; Take 1 capsule by mouth 2 (Two) Times a Day.    Flonase and antihistamine advised. A wait and see antibiotic for sinusitis bas been ordered for you. If symptoms worsen or persist you may take the prescribed antibiotic as ordered. If this happens stop antihistamine for duration of antibiotic treatment.

## 2019-09-06 NOTE — PATIENT INSTRUCTIONS

## 2019-09-19 RX ORDER — CARVEDILOL 25 MG/1
TABLET ORAL
Qty: 60 TABLET | Refills: 4 | Status: SHIPPED | OUTPATIENT
Start: 2019-09-19 | End: 2020-01-13

## 2019-09-24 ENCOUNTER — TELEPHONE (OUTPATIENT)
Dept: NEUROSURGERY | Facility: CLINIC | Age: 79
End: 2019-09-24

## 2019-11-01 ENCOUNTER — TRANSCRIBE ORDERS (OUTPATIENT)
Dept: ADMINISTRATIVE | Facility: HOSPITAL | Age: 79
End: 2019-11-01

## 2019-11-01 DIAGNOSIS — Z12.31 VISIT FOR SCREENING MAMMOGRAM: Primary | ICD-10-CM

## 2019-11-13 ENCOUNTER — OFFICE VISIT (OUTPATIENT)
Dept: INTERNAL MEDICINE | Facility: CLINIC | Age: 79
End: 2019-11-13

## 2019-11-13 VITALS
TEMPERATURE: 97.9 F | DIASTOLIC BLOOD PRESSURE: 76 MMHG | HEART RATE: 80 BPM | WEIGHT: 213 LBS | BODY MASS INDEX: 38.96 KG/M2 | OXYGEN SATURATION: 97 % | SYSTOLIC BLOOD PRESSURE: 112 MMHG

## 2019-11-13 DIAGNOSIS — R91.1 PULMONARY NODULE: ICD-10-CM

## 2019-11-13 DIAGNOSIS — Z01.419 WELL WOMAN EXAM: Primary | ICD-10-CM

## 2019-11-13 DIAGNOSIS — I77.810 AORTIC ROOT DILATION (HCC): ICD-10-CM

## 2019-11-13 DIAGNOSIS — E78.00 ELEVATED CHOLESTEROL: ICD-10-CM

## 2019-11-13 DIAGNOSIS — D50.0 IRON DEFICIENCY ANEMIA DUE TO CHRONIC BLOOD LOSS: ICD-10-CM

## 2019-11-13 DIAGNOSIS — I10 BENIGN ESSENTIAL HTN: ICD-10-CM

## 2019-11-13 PROCEDURE — 99214 OFFICE O/P EST MOD 30 MIN: CPT | Performed by: FAMILY MEDICINE

## 2019-11-13 RX ORDER — CLARITHROMYCIN 500 MG/1
500 TABLET, COATED ORAL 2 TIMES DAILY
Qty: 20 TABLET | Refills: 0 | Status: SHIPPED | OUTPATIENT
Start: 2019-11-13 | End: 2020-01-13

## 2019-11-13 NOTE — PROGRESS NOTES
Subjective   Loulou Collier is a 79 y.o. female.     Chief Complaint   Patient presents with   • Asthma   • Hypertension   • Hyperlipidemia         History of Present Illness   Delightful lady was going to go to [place] at the end of December.  Previous GYN/Pap smear was 4 years ago.  Will refer to GYN for another well woman exam.  Otherwise get mammograms in December.    Treatment of bronchitis with onset of mild asthma following the bronchitis was earlier this year.  She is improved with her current treatment.  She will follow-up with pulmonary before going on her trip.  We will provide her with clarithromycin 500 twice daily for up to 10 days on the trip if she develops bronchitis again.  She is instructed to stop simvastatin while taking clarithromycin.    Treatment of hypertension appears improved.  Labs will be done today in reference to hyperlipidemia as well.  Prior history of anemia looks improved.  We will again review history of prior colonoscopy on next visit.    Echocardiogram was reviewed with her and she has slight dilatation at the aortic root and also the thoracic aorta.  She is to get a CT scan repeat in 1 year based on that and also a pulmonary nodule.      The following portions of the patient's history were reviewed and updated as appropriate: allergies, current medications, past social history and problem list.    Review of Systems   Constitutional: Negative.    HENT: Negative.    Eyes: Negative.    Respiratory: Negative.    Cardiovascular: Negative.    Gastrointestinal: Negative.    Endocrine: Negative.    Genitourinary: Negative.    Musculoskeletal: Negative.    Skin: Negative.    Allergic/Immunologic: Negative.    Neurological: Negative.    Hematological: Negative.    Psychiatric/Behavioral: Negative.        Objective   Vitals:    11/13/19 1024   BP: 112/76   Pulse: 80   Temp: 97.9 °F (36.6 °C)   SpO2: 97%     Physical Exam   Constitutional: She is oriented to person, place, and time. She  appears well-developed and well-nourished.   HENT:   Head: Normocephalic and atraumatic.   Right Ear: Tympanic membrane and external ear normal.   Left Ear: Tympanic membrane and external ear normal.   Nose: Nose normal.   Mouth/Throat: Oropharynx is clear and moist.   Eyes: Conjunctivae and EOM are normal. Pupils are equal, round, and reactive to light.   Neck: Normal range of motion. Neck supple. No JVD present. No thyromegaly present.   Cardiovascular: Normal rate, regular rhythm, normal heart sounds and intact distal pulses.   Pulmonary/Chest: Effort normal and breath sounds normal.   Abdominal: Soft. Bowel sounds are normal.   Musculoskeletal: Normal range of motion.   Lymphadenopathy:     She has no cervical adenopathy.   Neurological: She is alert and oriented to person, place, and time. No cranial nerve deficit. Coordination normal.   Skin: Skin is warm and dry. No rash noted.   Psychiatric: She has a normal mood and affect. Her behavior is normal. Judgment and thought content normal.   Vitals reviewed.      Assessment/Plan   Problem List Items Addressed This Visit        Cardiovascular and Mediastinum    Elevated cholesterol    Relevant Orders    CBC & Differential    Comprehensive Metabolic Panel    Lipid Panel With / Chol / HDL Ratio    TSH    T4, Free    T3, Free    Urinalysis With Microscopic If Indicated (No Culture) - Urine, Clean Catch    Benign essential HTN    Relevant Orders    CBC & Differential    Comprehensive Metabolic Panel    Lipid Panel With / Chol / HDL Ratio    TSH    T4, Free    T3, Free    Urinalysis With Microscopic If Indicated (No Culture) - Urine, Clean Catch       Hematopoietic and Hemostatic    Iron deficiency anemia due to chronic blood loss    Relevant Orders    CBC & Differential    Comprehensive Metabolic Panel    Lipid Panel With / Chol / HDL Ratio    TSH    T4, Free    T3, Free    Urinalysis With Microscopic If Indicated (No Culture) - Urine, Clean Catch      Other Visit  Diagnoses     Well woman exam    -  Primary    Relevant Orders    Ambulatory Referral to Obstetrics / Gynecology      Plan: Screening labs.  She is up-to-date on pneumonia shots.  She is gotten a flu shot.  Will monitor exam referral.  Mammograms pending.  Clarithromycin 500 twice daily take on a trip to [place].  If she takes clarithromycin for onset of bronchitis she is to stop simvastatin.  Recheck in about 4 months.  Discussion about slight dilation of aortic root.

## 2019-11-14 LAB
ALBUMIN SERPL-MCNC: 4.3 G/DL (ref 3.5–5.2)
ALBUMIN/GLOB SERPL: 2 G/DL
ALP SERPL-CCNC: 66 U/L (ref 39–117)
ALT SERPL-CCNC: 13 U/L (ref 1–33)
APPEARANCE UR: CLEAR
AST SERPL-CCNC: 17 U/L (ref 1–32)
BACTERIA #/AREA URNS HPF: ABNORMAL /HPF
BASOPHILS # BLD AUTO: 0.03 10*3/MM3 (ref 0–0.2)
BASOPHILS NFR BLD AUTO: 0.6 % (ref 0–1.5)
BILIRUB SERPL-MCNC: 0.4 MG/DL (ref 0.2–1.2)
BILIRUB UR QL STRIP: NEGATIVE
BUN SERPL-MCNC: 20 MG/DL (ref 8–23)
BUN/CREAT SERPL: 19.2 (ref 7–25)
CALCIUM SERPL-MCNC: 9.4 MG/DL (ref 8.6–10.5)
CASTS URNS MICRO: ABNORMAL
CHLORIDE SERPL-SCNC: 103 MMOL/L (ref 98–107)
CHOLEST SERPL-MCNC: 153 MG/DL (ref 0–200)
CHOLEST/HDLC SERPL: 3.19 {RATIO}
CO2 SERPL-SCNC: 29.9 MMOL/L (ref 22–29)
COLOR UR: YELLOW
CREAT SERPL-MCNC: 1.04 MG/DL (ref 0.57–1)
EOSINOPHIL # BLD AUTO: 0.31 10*3/MM3 (ref 0–0.4)
EOSINOPHIL NFR BLD AUTO: 5.9 % (ref 0.3–6.2)
EPI CELLS #/AREA URNS HPF: ABNORMAL /HPF
ERYTHROCYTE [DISTWIDTH] IN BLOOD BY AUTOMATED COUNT: 14 % (ref 12.3–15.4)
GLOBULIN SER CALC-MCNC: 2.1 GM/DL
GLUCOSE SERPL-MCNC: 92 MG/DL (ref 65–99)
GLUCOSE UR QL: NEGATIVE
HCT VFR BLD AUTO: 40.1 % (ref 34–46.6)
HDLC SERPL-MCNC: 48 MG/DL (ref 40–60)
HGB BLD-MCNC: 12.4 G/DL (ref 12–15.9)
HGB UR QL STRIP: NEGATIVE
IMM GRANULOCYTES # BLD AUTO: 0.01 10*3/MM3 (ref 0–0.05)
IMM GRANULOCYTES NFR BLD AUTO: 0.2 % (ref 0–0.5)
KETONES UR QL STRIP: NEGATIVE
LDLC SERPL CALC-MCNC: 94 MG/DL (ref 0–100)
LEUKOCYTE ESTERASE UR QL STRIP: ABNORMAL
LYMPHOCYTES # BLD AUTO: 1.41 10*3/MM3 (ref 0.7–3.1)
LYMPHOCYTES NFR BLD AUTO: 26.9 % (ref 19.6–45.3)
MCH RBC QN AUTO: 26.6 PG (ref 26.6–33)
MCHC RBC AUTO-ENTMCNC: 30.9 G/DL (ref 31.5–35.7)
MCV RBC AUTO: 86.1 FL (ref 79–97)
MONOCYTES # BLD AUTO: 0.35 10*3/MM3 (ref 0.1–0.9)
MONOCYTES NFR BLD AUTO: 6.7 % (ref 5–12)
NEUTROPHILS # BLD AUTO: 3.14 10*3/MM3 (ref 1.7–7)
NEUTROPHILS NFR BLD AUTO: 59.7 % (ref 42.7–76)
NITRITE UR QL STRIP: NEGATIVE
NRBC BLD AUTO-RTO: 0 /100 WBC (ref 0–0.2)
PH UR STRIP: 6.5 [PH] (ref 5–8)
PLATELET # BLD AUTO: 205 10*3/MM3 (ref 140–450)
POTASSIUM SERPL-SCNC: 3.9 MMOL/L (ref 3.5–5.2)
PROT SERPL-MCNC: 6.4 G/DL (ref 6–8.5)
PROT UR QL STRIP: NEGATIVE
RBC # BLD AUTO: 4.66 10*6/MM3 (ref 3.77–5.28)
RBC #/AREA URNS HPF: ABNORMAL /HPF
SODIUM SERPL-SCNC: 143 MMOL/L (ref 136–145)
SP GR UR: 1.02 (ref 1–1.03)
T3FREE SERPL-MCNC: 2.6 PG/ML (ref 2–4.4)
T4 FREE SERPL-MCNC: 1.11 NG/DL (ref 0.93–1.7)
TRIGL SERPL-MCNC: 53 MG/DL (ref 0–150)
TSH SERPL DL<=0.005 MIU/L-ACNC: 1.49 UIU/ML (ref 0.27–4.2)
UROBILINOGEN UR STRIP-MCNC: ABNORMAL MG/DL
VLDLC SERPL CALC-MCNC: 10.6 MG/DL
WBC # BLD AUTO: 5.25 10*3/MM3 (ref 3.4–10.8)
WBC #/AREA URNS HPF: ABNORMAL /HPF

## 2020-01-13 RX ORDER — CARVEDILOL 25 MG/1
TABLET ORAL
Qty: 180 TABLET | Refills: 1 | Status: SHIPPED | OUTPATIENT
Start: 2020-01-13 | End: 2020-07-10

## 2020-01-13 RX ORDER — HYDROCHLOROTHIAZIDE 12.5 MG/1
12.5 CAPSULE, GELATIN COATED ORAL EVERY MORNING
Qty: 90 CAPSULE | Refills: 1 | Status: SHIPPED | OUTPATIENT
Start: 2020-01-13 | End: 2020-07-10

## 2020-01-13 RX ORDER — SIMVASTATIN 40 MG
40 TABLET ORAL NIGHTLY
Qty: 90 TABLET | Refills: 1 | Status: SHIPPED | OUTPATIENT
Start: 2020-01-13 | End: 2020-07-10

## 2020-01-31 ENCOUNTER — TELEPHONE (OUTPATIENT)
Dept: OBSTETRICS AND GYNECOLOGY | Facility: CLINIC | Age: 80
End: 2020-01-31

## 2020-02-10 ENCOUNTER — APPOINTMENT (OUTPATIENT)
Dept: MAMMOGRAPHY | Facility: HOSPITAL | Age: 80
End: 2020-02-10

## 2020-04-07 RX ORDER — MELOXICAM 15 MG/1
TABLET ORAL
Qty: 90 TABLET | Refills: 3 | Status: SHIPPED | OUTPATIENT
Start: 2020-04-07 | End: 2021-08-10 | Stop reason: SDUPTHER

## 2020-04-13 ENCOUNTER — OFFICE VISIT (OUTPATIENT)
Dept: INTERNAL MEDICINE | Facility: CLINIC | Age: 80
End: 2020-04-13

## 2020-04-13 DIAGNOSIS — I10 BENIGN ESSENTIAL HTN: ICD-10-CM

## 2020-04-13 DIAGNOSIS — Z01.419 WELL WOMAN EXAM: Primary | ICD-10-CM

## 2020-04-13 DIAGNOSIS — E78.00 ELEVATED CHOLESTEROL: ICD-10-CM

## 2020-04-13 DIAGNOSIS — J45.909 ASTHMA DUE TO ENVIRONMENTAL ALLERGIES: ICD-10-CM

## 2020-04-13 DIAGNOSIS — D50.0 IRON DEFICIENCY ANEMIA DUE TO CHRONIC BLOOD LOSS: ICD-10-CM

## 2020-04-13 DIAGNOSIS — J30.1 SEASONAL ALLERGIC RHINITIS DUE TO POLLEN: ICD-10-CM

## 2020-04-13 PROCEDURE — 99214 OFFICE O/P EST MOD 30 MIN: CPT | Performed by: FAMILY MEDICINE

## 2020-04-13 RX ORDER — FEXOFENADINE HCL 180 MG/1
180 TABLET ORAL DAILY
Qty: 30 TABLET | Refills: 5 | Status: SHIPPED | OUTPATIENT
Start: 2020-04-13 | End: 2021-02-05

## 2020-04-13 NOTE — PROGRESS NOTES
Subjective   Loulou Collier is a 80 y.o. female.     Chief Complaint   Patient presents with   • Allergies   • Asthma         History of Present Illness   Advance Care Planning   ACP discussion was held with the patient during this visit. Patient has an advance directive in EMR which is still valid.  Patient does not have an advance directive, information provided.   This was an audio and video enabled telemedicine encounter.  This visit is switched to a telehealth visit via video supported zoom platform through my chart.      The patient is remarkably stable and treatment of hypertension and also evidence of reactive airway disease.  She would like to walk outside I am encouraged this is long she takes some fexofenadine on an 180 mg daily plus her Breo inhaler.  She has rescue inhaler as well.  Otherwise we discussed treatment of hypertension and hyperlipidemia.  She continues on meloxicam as needed for osteoarthritis.    The following portions of the patient's history were reviewed and updated as appropriate: allergies, current medications, past social history and problem list.    Review of Systems   Constitutional: Negative.    HENT: Negative.    Eyes: Negative.    Respiratory: Positive for wheezing.    Cardiovascular: Negative.    Gastrointestinal: Negative.    Endocrine: Negative.    Genitourinary: Negative.    Musculoskeletal: Positive for arthralgias.   Skin: Negative.    Allergic/Immunologic: Positive for environmental allergies.   Neurological: Negative.    Hematological: Negative.    Psychiatric/Behavioral: Negative.        Objective   There were no vitals filed for this visit.  Physical Exam   Constitutional: She appears well-developed.   HENT:   Head: Normocephalic and atraumatic.   Right Ear: External ear normal.   Left Ear: External ear normal.   Eyes: Pupils are equal, round, and reactive to light.   Neurological: She is alert.   Psychiatric: She has a normal mood and affect.       Assessment/Plan    Problem List Items Addressed This Visit        Cardiovascular and Mediastinum    Elevated cholesterol    Benign essential HTN       Hematopoietic and Hemostatic    Iron deficiency anemia due to chronic blood loss      Other Visit Diagnoses     Well woman exam    -  Primary    Relevant Orders    Ambulatory Referral to Obstetrics / Gynecology    Asthma due to environmental allergies        Seasonal allergic rhinitis due to pollen          Plan: Add Allegra 180 mg daily to her regimen for seasonal rhinitis and reactive airway disease.  Continue treatment for hyperlipidemia hypertension and recheck labs in next visit for iron deficient anemia.  Follow-up with labs approximately 6 months.

## 2020-05-15 ENCOUNTER — APPOINTMENT (OUTPATIENT)
Dept: MAMMOGRAPHY | Facility: HOSPITAL | Age: 80
End: 2020-05-15

## 2020-05-27 ENCOUNTER — HOSPITAL ENCOUNTER (OUTPATIENT)
Dept: MAMMOGRAPHY | Facility: HOSPITAL | Age: 80
Discharge: HOME | End: 2020-05-27
Admitting: FAMILY MEDICINE

## 2020-05-27 DIAGNOSIS — Z12.31 VISIT FOR SCREENING MAMMOGRAM: ICD-10-CM

## 2020-05-27 PROCEDURE — 77067 SCR MAMMO BI INCL CAD: CPT

## 2020-05-27 PROCEDURE — 77063 BREAST TOMOSYNTHESIS BI: CPT

## 2020-07-10 RX ORDER — HYDROCHLOROTHIAZIDE 12.5 MG/1
12.5 CAPSULE, GELATIN COATED ORAL EVERY MORNING
Qty: 90 CAPSULE | Refills: 1 | Status: SHIPPED | OUTPATIENT
Start: 2020-07-10 | End: 2022-01-06 | Stop reason: SDUPTHER

## 2020-07-10 RX ORDER — SIMVASTATIN 40 MG
40 TABLET ORAL NIGHTLY
Qty: 90 TABLET | Refills: 1 | Status: SHIPPED | OUTPATIENT
Start: 2020-07-10 | End: 2022-01-06 | Stop reason: SDUPTHER

## 2020-07-10 RX ORDER — CARVEDILOL 25 MG/1
TABLET ORAL
Qty: 180 TABLET | Refills: 1 | Status: SHIPPED | OUTPATIENT
Start: 2020-07-10 | End: 2021-10-20 | Stop reason: SDUPTHER

## 2020-10-15 ENCOUNTER — OFFICE VISIT (OUTPATIENT)
Dept: INTERNAL MEDICINE | Facility: CLINIC | Age: 80
End: 2020-10-15

## 2020-10-15 VITALS
TEMPERATURE: 98 F | OXYGEN SATURATION: 98 % | BODY MASS INDEX: 40.63 KG/M2 | HEART RATE: 66 BPM | DIASTOLIC BLOOD PRESSURE: 84 MMHG | WEIGHT: 215.2 LBS | SYSTOLIC BLOOD PRESSURE: 128 MMHG | HEIGHT: 61 IN

## 2020-10-15 DIAGNOSIS — D50.0 IRON DEFICIENCY ANEMIA DUE TO CHRONIC BLOOD LOSS: ICD-10-CM

## 2020-10-15 DIAGNOSIS — Z12.31 ENCOUNTER FOR SCREENING MAMMOGRAM FOR BREAST CANCER: ICD-10-CM

## 2020-10-15 DIAGNOSIS — M81.0 POSTMENOPAUSAL OSTEOPOROSIS: Primary | ICD-10-CM

## 2020-10-15 DIAGNOSIS — I10 BENIGN ESSENTIAL HTN: ICD-10-CM

## 2020-10-15 DIAGNOSIS — Z01.419 WELL WOMAN EXAM: ICD-10-CM

## 2020-10-15 DIAGNOSIS — E78.00 ELEVATED CHOLESTEROL: ICD-10-CM

## 2020-10-15 DIAGNOSIS — R73.09 ELEVATED GLUCOSE: ICD-10-CM

## 2020-10-15 LAB
ALBUMIN SERPL-MCNC: 4.1 G/DL (ref 3.5–5.2)
ALBUMIN/GLOB SERPL: 2 G/DL
ALP SERPL-CCNC: 78 U/L (ref 39–117)
ALT SERPL-CCNC: 17 U/L (ref 1–33)
APPEARANCE UR: CLEAR
AST SERPL-CCNC: 17 U/L (ref 1–32)
BACTERIA #/AREA URNS HPF: ABNORMAL /HPF
BASOPHILS # BLD AUTO: 0.02 10*3/MM3 (ref 0–0.2)
BASOPHILS NFR BLD AUTO: 0.4 % (ref 0–1.5)
BILIRUB SERPL-MCNC: 0.3 MG/DL (ref 0–1.2)
BILIRUB UR QL STRIP: NEGATIVE
BUN SERPL-MCNC: 21 MG/DL (ref 8–23)
BUN/CREAT SERPL: 22.3 (ref 7–25)
CALCIUM SERPL-MCNC: 9.4 MG/DL (ref 8.6–10.5)
CASTS URNS MICRO: ABNORMAL
CHLORIDE SERPL-SCNC: 106 MMOL/L (ref 98–107)
CHOLEST SERPL-MCNC: 168 MG/DL (ref 0–200)
CHOLEST/HDLC SERPL: 3.36 {RATIO}
CO2 SERPL-SCNC: 31.8 MMOL/L (ref 22–29)
COLOR UR: YELLOW
CREAT SERPL-MCNC: 0.94 MG/DL (ref 0.57–1)
EOSINOPHIL # BLD AUTO: 0.19 10*3/MM3 (ref 0–0.4)
EOSINOPHIL NFR BLD AUTO: 3.7 % (ref 0.3–6.2)
EPI CELLS #/AREA URNS HPF: ABNORMAL /HPF
ERYTHROCYTE [DISTWIDTH] IN BLOOD BY AUTOMATED COUNT: 14.2 % (ref 12.3–15.4)
FERRITIN SERPL-MCNC: 489 NG/ML (ref 13–150)
GLOBULIN SER CALC-MCNC: 2.1 GM/DL
GLUCOSE SERPL-MCNC: 89 MG/DL (ref 65–99)
GLUCOSE UR QL: NEGATIVE
HCT VFR BLD AUTO: 39.4 % (ref 34–46.6)
HDLC SERPL-MCNC: 50 MG/DL (ref 40–60)
HGB BLD-MCNC: 12.7 G/DL (ref 12–15.9)
HGB UR QL STRIP: NEGATIVE
IMM GRANULOCYTES # BLD AUTO: 0 10*3/MM3 (ref 0–0.05)
IMM GRANULOCYTES NFR BLD AUTO: 0 % (ref 0–0.5)
IRON SATN MFR SERPL: 16 % (ref 20–50)
IRON SERPL-MCNC: 56 MCG/DL (ref 37–145)
KETONES UR QL STRIP: NEGATIVE
LDLC SERPL CALC-MCNC: 106 MG/DL (ref 0–100)
LEUKOCYTE ESTERASE UR QL STRIP: ABNORMAL
LYMPHOCYTES # BLD AUTO: 1.33 10*3/MM3 (ref 0.7–3.1)
LYMPHOCYTES NFR BLD AUTO: 25.9 % (ref 19.6–45.3)
MCH RBC QN AUTO: 27.5 PG (ref 26.6–33)
MCHC RBC AUTO-ENTMCNC: 32.2 G/DL (ref 31.5–35.7)
MCV RBC AUTO: 85.3 FL (ref 79–97)
MONOCYTES # BLD AUTO: 0.29 10*3/MM3 (ref 0.1–0.9)
MONOCYTES NFR BLD AUTO: 5.7 % (ref 5–12)
NEUTROPHILS # BLD AUTO: 3.3 10*3/MM3 (ref 1.7–7)
NEUTROPHILS NFR BLD AUTO: 64.3 % (ref 42.7–76)
NITRITE UR QL STRIP: NEGATIVE
NRBC BLD AUTO-RTO: 0 /100 WBC (ref 0–0.2)
PH UR STRIP: 5.5 [PH] (ref 5–8)
PLATELET # BLD AUTO: 204 10*3/MM3 (ref 140–450)
POTASSIUM SERPL-SCNC: 4.3 MMOL/L (ref 3.5–5.2)
PROT SERPL-MCNC: 6.2 G/DL (ref 6–8.5)
PROT UR QL STRIP: NEGATIVE
RBC # BLD AUTO: 4.62 10*6/MM3 (ref 3.77–5.28)
RBC #/AREA URNS HPF: ABNORMAL /HPF
SODIUM SERPL-SCNC: 143 MMOL/L (ref 136–145)
SP GR UR: 1.02 (ref 1–1.03)
T4 FREE SERPL-MCNC: 1.21 NG/DL (ref 0.93–1.7)
TIBC SERPL-MCNC: 339 MCG/DL
TRIGL SERPL-MCNC: 64 MG/DL (ref 0–150)
TSH SERPL DL<=0.005 MIU/L-ACNC: 1.91 UIU/ML (ref 0.27–4.2)
UIBC SERPL-MCNC: 283 MCG/DL (ref 112–346)
UROBILINOGEN UR STRIP-MCNC: ABNORMAL MG/DL
VIT B12 SERPL-MCNC: 654 PG/ML (ref 211–946)
VLDLC SERPL CALC-MCNC: 12 MG/DL (ref 5–40)
WBC # BLD AUTO: 5.13 10*3/MM3 (ref 3.4–10.8)
WBC #/AREA URNS HPF: ABNORMAL /HPF

## 2020-10-15 PROCEDURE — 99214 OFFICE O/P EST MOD 30 MIN: CPT | Performed by: FAMILY MEDICINE

## 2020-10-15 NOTE — PROGRESS NOTES
Subjective   Loulou Collier is a 80 y.o. female.     Chief Complaint   Patient presents with   • Hypertension     6 month follow up         History of Present Illness   Very pleasant lady with a history of mild asthma followed by pulmonary also hypertension elevated cholesterol osteoarthritis of the knees.  There is a prior history of iron deficient anemia.  She has had a normal colonoscopy as of January 2015.  She is going to get records of the doctor who performed the colonoscopy.    Otherwise she discussed getting a bone density study with mammograms on repeat in May 2021.    We discussed supplements and also general health maintenance.    Immunizations are reviewed with her also.    She would like to get a well woman exam with GYN.    She has had prior echocardiogram a slight dilation of the aortic root noted last year.      The following portions of the patient's history were reviewed and updated as appropriate: allergies, current medications, past social history and problem list.    Review of Systems   Constitutional: Negative.    HENT: Negative.    Eyes: Negative.    Respiratory: Positive for shortness of breath.    Cardiovascular: Negative.    Gastrointestinal: Negative.    Endocrine: Negative.    Genitourinary: Negative.    Musculoskeletal: Negative.    Skin: Negative.    Allergic/Immunologic: Negative.    Neurological: Negative.    Hematological: Negative.    Psychiatric/Behavioral: Negative.        Objective   Vitals:    10/15/20 1004   BP: 128/84   Pulse: 66   Temp: 98 °F (36.7 °C)   SpO2: 98%     Physical Exam  Vitals signs reviewed.   Constitutional:       Appearance: She is well-developed.   HENT:      Head: Normocephalic and atraumatic.      Right Ear: Tympanic membrane and external ear normal.      Left Ear: Tympanic membrane and external ear normal.      Nose: Nose normal.   Eyes:      Conjunctiva/sclera: Conjunctivae normal.      Pupils: Pupils are equal, round, and reactive to light.   Neck:       Musculoskeletal: Normal range of motion and neck supple.      Thyroid: No thyromegaly.      Vascular: No JVD.   Cardiovascular:      Rate and Rhythm: Normal rate and regular rhythm.      Heart sounds: Normal heart sounds.   Pulmonary:      Effort: Pulmonary effort is normal.      Breath sounds: Normal breath sounds.   Abdominal:      General: Bowel sounds are normal.      Palpations: Abdomen is soft.   Musculoskeletal:      Right knee: She exhibits decreased range of motion.      Left knee: Normal.   Lymphadenopathy:      Cervical: No cervical adenopathy.   Skin:     General: Skin is warm and dry.      Findings: No rash.   Neurological:      Mental Status: She is alert and oriented to person, place, and time.      Cranial Nerves: No cranial nerve deficit.      Coordination: Coordination normal.   Psychiatric:         Behavior: Behavior normal.         Thought Content: Thought content normal.         Judgment: Judgment normal.         Assessment/Plan   Problems Addressed this Visit        Cardiovascular and Mediastinum    Elevated cholesterol    Relevant Orders    CBC & Differential    Comprehensive Metabolic Panel    Lipid Panel With / Chol / HDL Ratio    TSH    T4, Free    Urinalysis With Microscopic If Indicated (No Culture) - Urine, Clean Catch    Vitamin B12    Iron and TIBC    Ferritin    Benign essential HTN    Relevant Orders    CBC & Differential    Comprehensive Metabolic Panel    Lipid Panel With / Chol / HDL Ratio    TSH    T4, Free    Urinalysis With Microscopic If Indicated (No Culture) - Urine, Clean Catch    Vitamin B12    Iron and TIBC    Ferritin       Hematopoietic and Hemostatic    Iron deficiency anemia due to chronic blood loss    Relevant Orders    CBC & Differential    Comprehensive Metabolic Panel    Lipid Panel With / Chol / HDL Ratio    TSH    T4, Free    Urinalysis With Microscopic If Indicated (No Culture) - Urine, Clean Catch    Vitamin B12    Iron and TIBC    Ferritin      Other Visit  Diagnoses     Postmenopausal osteoporosis    -  Primary    Relevant Orders    DEXA Bone Density Axial    Encounter for screening mammogram for breast cancer        Relevant Orders    Mammo Screening Bilateral With CAD    Elevated glucose        Relevant Orders    CBC & Differential    Comprehensive Metabolic Panel    Lipid Panel With / Chol / HDL Ratio    TSH    T4, Free    Urinalysis With Microscopic If Indicated (No Culture) - Urine, Clean Catch    Vitamin B12    Iron and TIBC    Ferritin      Diagnoses       Codes Comments    Postmenopausal osteoporosis    -  Primary ICD-10-CM: M81.0  ICD-9-CM: 733.01     Encounter for screening mammogram for breast cancer     ICD-10-CM: Z12.31  ICD-9-CM: V76.12     Benign essential HTN     ICD-10-CM: I10  ICD-9-CM: 401.1     Elevated cholesterol     ICD-10-CM: E78.00  ICD-9-CM: 272.0     Iron deficiency anemia due to chronic blood loss     ICD-10-CM: D50.0  ICD-9-CM: 280.0     Elevated glucose     ICD-10-CM: R73.09  ICD-9-CM: 790.29       Plan: Meds remain the same.  Recheck 6 months.  Labs today.  Routine follow-up with pulmonary once a year.  Follow-up with GYN.  DEXA scanning mammograms in the spring.

## 2020-10-29 DIAGNOSIS — J40 BRONCHITIS: ICD-10-CM

## 2020-10-31 RX ORDER — ALBUTEROL SULFATE 90 UG/1
AEROSOL, METERED RESPIRATORY (INHALATION)
Qty: 18 G | Refills: 2 | Status: SHIPPED | OUTPATIENT
Start: 2020-10-31

## 2021-02-05 RX ORDER — COVID-19 ANTIGEN TEST
KIT MISCELLANEOUS
Qty: 30 TABLET | Refills: 2 | Status: SHIPPED | OUTPATIENT
Start: 2021-02-05 | End: 2021-07-26

## 2021-03-02 DIAGNOSIS — Z23 IMMUNIZATION DUE: ICD-10-CM

## 2021-04-01 NOTE — ANESTHESIA PREPROCEDURE EVALUATION
Anesthesia Evaluation     Patient summary reviewed   NPO Solid Status: > 8 hours       Airway   Mallampati: I  Neck ROM: full  no difficulty expected  Dental      Pulmonary    Cardiovascular     ECG reviewed  Rhythm: regular    (+) hypertension,       Neuro/Psych  GI/Hepatic/Renal/Endo      Musculoskeletal     Abdominal    Substance History      OB/GYN          Other                                        Anesthesia Plan    ASA 3     general     intravenous induction   Anesthetic plan and risks discussed with patient.  Use of blood products discussed with patient .      Department of Medicine  Division of Endocrinology        Chief Complaint: Tessie Mcfarland is a 46 year old female comes in today for DM, Referred by LUCERO Carpio     Diabetes Mellitus Type 2  Duration:  2006 , had abnormal lab. She was on oral medications metformin ( had GI side effects ) , glipizide, was taking Farxiga  insulin was added 3 years ago   Prior hospitalizations related to DM: no   DM regimen at home:  Jardiance 10 mg am   Lantus 35  u bid ( she decreased because of lows)   Humalog 8 units tid ac and ss 1:50 >150  Bydureon 2 mg weekly   Accucheks  Meter downloaded and reviewed with the patient   Any hx of hypoglycemia: yes, had BG in 50s feels shaky, sweaty, hungry,- not all the time.  several episodes nocturnal , has some second half of the day after meal  Latest HbA1c  9.8 ( was12.8 )  Dietary compliance imrpoving   Meals per day: 3  Uses carbohydrate count: yes  Diabetes education: yes   Family history of DM: yes    Preventative Care:  HTN: no   On ACE-inhibitors/ARB: yes lisinopril   History of nephropathy:  Yes   Last urine microalbumin/creatinine:10/2020  History of retinopathy:  no  Last eye exam:  More then one year ago   Hx of laser surgery: no   Peripheral neuropathy:  No   On treatment:  No   History of cerebrovascular disease: no   History of hyperlipidemia: yes  Medications: lipitor  History of coronary artery disease: yes 2019, MI   On aspirin: plavix   History of gastroparesis: No  History of urinary incontinence: No      Lifestyle Modification:  Exercise: caregiver   Balanced carb-controlled diet: yes, limiting carbs   Counseled on smoking and/or alcohol: no tobacco, social alcohol  Self-foot examinations at home: yes   ID DM - no, was educated on benefits     No History of pancreatitis   No History of thyroid cancer      Tessie Mcfarland  YOB: 1974  Date of diagnosis:   Exported from [employer]: Apr 1, 2021    Reporting Period: Mar 19 - Apr 1, 2021    Avg.  Daily Readings In Range (mg/dL)  >250     0.1  180-250  0.4     2.1  54-70    0.4  <54      0    Avg. Glucose (BGM): 128 mg/dL    Std. Deviation (BGM): 58 mg/dL    CV (BGM): 45%    BG Extents (BGM) (mg/dL)  Min BG  57  Max BG  363                  Past Medical History:   Diagnosis Date   • Depressive disorder    • Diabetes mellitus, type 2 (CMS/[facility])    • History of pre-eclampsia    • Multiple sclerosis (CMS/[facility]) 3/2011   • Obstructive sleep apnea (adult) (pediatric) 10/31/2012   • Other and unspecified hyperlipidemia    • PE (pulmonary embolism) 2012   • STEMI (ST elevation myocardial infarction) (CMS/[facility])     9. - [place]       Past Surgical History:   Procedure Laterality Date   • Car ir amb ip interventional cardiac and peripheral pre procedure order set (ppo #[id number])      post PCI of the proximal LAD with unsucccessful   •  section, classic     •  section, classic     • Cholecystectomy     • Nerve graft      left neck nerve graft       Social History     Socioeconomic History   • Marital status: Single     Spouse name: Not on file   • Number of children: Not on file   • Years of education: Not on file   • Highest education level: Not on file   Occupational History   • Occupation: unemployed   Tobacco Use   • Smoking status: Former Smoker     Packs/day: 0.10     Years: 0.50     Pack years: 0.05     Types: Cigarettes     Start date: 1999     Quit date: 3/17/2012     Years since quittin.0   • Smokeless tobacco: Never Used   Substance and Sexual Activity   • Alcohol use: Yes     Alcohol/week: 0.0 standard drinks     Comment: socially   • Drug use: Yes     Types: Marijuana     Comment: ocasionally   • Sexual activity: Yes     Partners: Male     Birth control/protection: I.U.D.   Other Topics Concern   • Not on file   Social History Narrative   • Not on file     Social Determinants of Health     Financial Resource Strain: Not on file   Food Insecurity: Not on file    Transportation Needs:    • Lack of Transportation (Medical):    • Lack of Transportation (Non-Medical):    Physical Activity:    • Days of Exercise per Week:    • Minutes of Exercise per Session:    Stress: Not on file   Social Connections:    • Social Determinants: Social Connections:    Intimate Partner Violence: At Risk   • Social Determinants: Intimate Partner Violence Past Fear: 2   • Social Determinants: Intimate Partner Violence Current Fear: 2       Family History   Problem Relation Age of Onset   • Diabetes Mother    • Diabetes Father    • Cancer Father         throat   • Seizure Disorder Sister         epilepsy   • Seizure Disorder Brother         epilepsy   • Cystic Fibrosis Daughter    • Cataracts Daughter    • Obesity Daughter            Medications:  Current Outpatient Medications   Medication Sig   • Insulin Lispro, 1 Unit Dial, (HumaLOG KwikPen) 100 UNIT/ML pen-injector Prime 2 units before each dose. 10 units per meal + 151-200 + 4, 201-250 + 6, 251-300  + 8, 301-350 + 12, 351-400 = 16   • insulin glargine 100 UNIT/ML pen-injector Inject 45 Units into the skin 2 times daily. Prime 2 units before each dose. (Patient taking differently: Inject 35 Units into the skin 2 times daily. Prime 2 units before each dose.)   • blood glucose (ONE TOUCH ULTRA TEST) test strip USE TO TEST BLOOD SUGAR FOUR TIMES DAILY AS DIRECTED   • Insulin Pen Needle (B-D U/F PEN NEEDLE) 31G X 5 MM Misc USE TO INJECT INSULIN THREE TIMES DAILY.   • empagliflozin (Jardiance) 10 MG tablet Take 1 tablet by mouth daily (before breakfast).   • Glucagon (Baqsimi One Pack) 3 MG/DOSE Powder Call 911.  Place 1 spray in 1 nostril.  If no response in 15 minutes, place 1 more spray in nostril with new device.   • amitriptyline (ELAVIL) 50 MG tablet Take 1 tablet by mouth nightly. At bedtime   • sumatriptan (IMITREX) 100 MG tablet Take 0.5 tablets by mouth as needed for Migraine. May repeat after 2 hours if needed. No more than 2 tabs per  week   • topiramate (TOPAMAX) 50 MG tablet Take 1 tablet by mouth 2 times daily.   • atorvastatin (LIPITOR) 80 MG tablet Take 1 tablet by mouth daily. (Patient taking differently: Take 40 mg by mouth daily. )   • exenatide ER (Bydureon) 2 MG pen-injector Inject 2 mg into the skin 1 day a week.   • lisinopril (ZESTRIL) 5 MG tablet Take 1 tablet by mouth daily.   • apixaBAN (Eliquis) 5 MG Tab Take 1 tablet by mouth every 12 hours. Take 1 tablet by mouth twice daily.   • Lancets (OneTouch Delica Plus [id number]) Misc TEST FOUR TIMES D   • furosemide (LASIX) 20 MG tablet TK 2 TS PO QD   • Levonorgestrel (MIRENA, 52 MG, IU) 1 Units by Intrauterine route.   • Blood Glucose Monitoring Suppl (BLOOD GLUCOSE MONITOR SYSTEM) w/Device Kit To test four times daily. Dx: Uncontrolled DM2. LOS 99 years. Meter: Per Insurance preference.   • Lancets Misc. Kit To test four times daily. Dx: Uncontrolled DM2 LOS 99 years Brand: Per insurance preference.   • metoPROLOL succinate (TOPROL-XL) 50 MG 24 hr tablet Take 1 tablet by mouth daily.   • clopidogrel (PLAVIX) 75 MG tablet Take 1 tablet by mouth daily.   • nitroGLYcerin (NITROSTAT) 0.4 MG sublingual tablet Place 1 tablet under the tongue every 5 minutes as needed for Chest pain.   • Cholecalciferol (VITAMIN D-3) 5000 units Tab Take 5,000 Units by mouth daily.   • fluticasone (FLONASE) 50 MCG/ACT nasal spray SHAKE LIQUID AND USE 1 SPRAY IN EACH NOSTRIL DAILY (Patient taking differently: SHAKE LIQUID AND USE 1 SPRAY IN EACH NOSTRIL DAILY  as needed)   • Multiple Vitamins-Minerals (DAILY MULTIVITAMIN PO) Take 1 tablet by mouth daily.     No current facility-administered medications for this visit.       ALLERGIES:  No Known Allergies    REVIEW OF SYSTEMS:  Completed by MA, I reviewed it with the patient and agree with it's content.    PHYSICAL EXAMINATION:  Visit Vitals  LMP 10/01/2020      Constitutional: This is 46 year old Obese Black/ female in no distress.  She  ambulates normally without assistance. AAO (awake, alert, oriented) times 3.  Psychiatric:  Alert and oriented, normal mood and affect, normal eye contact, clear speech.  Neck: No masses. No cervical or supraclavicular lymphadenopathy. No thyromegaly, Trachea is midline. No JVD (jugular venous distention).  Lungs: Chest symmetrical, Non-labored and clear to auscultation bilaterally, No rales, No Rhonchi.  Cardiovascular: S1, S2 noted, regular rate and rhythm, no gallop or murmur. No edema.  Gastrointestinal: Normal bowel sounds, nontender, non-distended, no masses or guarding, no hepatosplenomegaly.  Skin: Warm and dry, normal texture, no rashes or ulcerations.   Musculoskeletal: Normal gait, no tremor, no clubbing or cyanosis.  Neurologic:  Reflexes symmetrical and within normal limits, Cranial nerves 3-12 intact.      Diabetic Foot Exam Documentation     Normal Bilateral Foot Exam:  Skin integrity is normal. Dorsalis pedis and posterior tibial pulses are present.  Pressure sensation using the [place] monofilament is present.    Results:     Ref. Range 1/10/2018 16:01 5/30/2018 08:33 9/4/2018 13:25 9/20/2019 11:46 12/10/2019 08:22 10/1/2020 11:08 12/29/2020 10:09 3/26/2021 15:28   GLYCOHEMOGLOBIN A1C Latest Ref Range: 4.5 - 5.6 % 14.0 (H) 7.8 (H) 8.1 (H) 13.1 (H) 14.7 (H) 15.7 (H) 12.8 (H) 9.8 (H)          Ref. Range 3/26/2021 15:28   Sodium Latest Ref Range: 135 - 145 mmol/L 142   Potassium Latest Ref Range: 3.4 - 5.1 mmol/L 4.2   Chloride Latest Ref Range: 98 - 107 mmol/L 105   CO2 Latest Ref Range: 21 - 32 mmol/L 27   ANION GAP Latest Ref Range: 10 - 20 mmol/L 14   Glucose Latest Ref Range: 65 - 99 mg/dL 85   BUN Latest Ref Range: 6 - 20 mg/dL 12   Creatinine Latest Ref Range: 0.51 - 0.95 mg/dL 0.79   Glomerular Filtration Rate Latest Ref Range: >90 mL/min/1.73m2 >90   BUN/CREATININE RATIO Latest Ref Range: 7 - 25  15   CALCIUM Latest Ref Range: 8.4 - 10.2 mg/dL 8.7   TOTAL BILIRUBIN Latest Ref Range:  0.2 - 1.0 mg/dL 0.1 (L)   AST/SGOT Latest Ref Range: <=37 Units/L 25   ALT/SGPT Latest Ref Range: <64 Units/L 50   ALK PHOSPHATASE Latest Ref Range: 45 - 117 Units/L 103   Albumin Latest Ref Range: 3.6 - 5.1 g/dL 3.6   GLOBULIN Latest Ref Range: 2.0 - 4.0 g/dL 3.9   A/G Ratio, Serum Latest Ref Range: 1.0 - 2.4  0.9 (L)   TOTAL PROTEIN Latest Ref Range: 6.4 - 8.2 g/dL 7.5   Fasting Status Latest Units: Hours 4     Results for EDITH TINEO (MRN [id number]) as of 4/1/2021 10:00   Ref. Range 12/29/2020 10:09   Fasting Status Latest Units: Hours 12   CHOLESTEROL Latest Ref Range: <=199 mg/dL 163   CHOL/HDL Latest Ref Range: <=4.4  4.2   HDL Latest Ref Range: >=50 mg/dL 39 (L)   CALCULATED LDL Latest Ref Range: <=129 mg/dL 95   CALCULATED NON HDL Latest Units: mg/dL 124   TRIGLYCERIDE Latest Ref Range: <=149 mg/dL 144        Ref. Range 9/4/2018 13:25 9/20/2019 11:46   VITAMIND, 25 HYDROXY Latest Ref Range: 30.0 - 100.0 ng/mL 29.4 (L) 23.1 (L)      Ref. Range 9/20/2019 11:46 10/1/2020 11:08   CREATININE, URINE (TOTAL) Latest Units: mg/dL 208.00 94.20   MICROALBUMIN,UA (TTL) Latest Units: mg/dL 2.10    MICROALBUMIN/CREAT Latest Ref Range: <=29.0 mg/g 10.1 38.2 (H)      Ref. Range 10/1/2020 11:08   T4, FREE Latest Ref Range: 0.8 - 1.5 ng/dL 1.0   TSH Latest Ref Range: 0.350 - 5.000 mcUnits/mL 0.708       Assessment/Plan:  Type 2 diabetes mellitus with microalbuminuria, with long-term current use of insulin (CMS/[facility])  (primary encounter diagnosis)  DM type 2 with diabetic mixed hyperlipidemia (CMS/[facility])  Metabolic syndrome  Type 2 diabetes mellitus with foot ulcer, with long-term current use of insulin (CMS/[facility])  Vitamin D deficiency  Class 1 obesity due to excess calories with serious comorbidity and body mass index (BMI) of 34.0 to 34.9 in adult   Improving control Hba1c 9.8 ( was 12.8) average  .   Significant improvement based on blood sugars review. she had hypoglycemia, decreased Lantus.        Ttresiba and  toujeo not covered.    Test 4 times a day   Am fasting, before each meal and 2 hrs after meal ( alternate meals )      Will order CGMs Elza   Will see diabetic educator      Bydureon 2 mg weekly   Will add Jardiance 10 mg am ( drink more water ) - decrease lisinopril in half   decrease  Lantus 35 u am and 30 u pm    decrease Humalog 8 units with food 3 times a day and ss      150-200 equals 1 unit   201-250 equals 2 units   251-300 equals 3 units   301-350 equals 4 units   351-400 equals 5 units   401-450 equals 6 units   Greater than 450 equals 7 units.     If low blood sugars , decrease all insulin by 5 units and call us  we discussed tapering insulin down based on blood sugars. She is improving diet and physical activity     Hypoglycemia instructions.       CGMs is a medical necessity for this patient. uncontrolled DM type 2 , HBa1c 9.8, in multidose insulin regimen ( 5 injections) fluctuating blood sugars, requires frequent insulin adjustment,hypoglycemia and hypoglycemia unawarness, needs closer monitoring blood sugars during COVID pandemic.       - Reviewed with patient the importance of blood glucose control in order to reduce acute & chronic complications of diabetes.  - Diet review - focus on whole grains, fruits & vegetables, low in fats & carbohydrates.  - Encouraged to adhere to DM/Renal friendly diet.   - Exercise - goal 30 minutes/day, 5 days/week. Any increase improves diabetes control.  - Patient was instructed to log blood sugar over the next few weeks and bring readings back to the clinic.   - Encouraged to follow up with ophthalmologist as per their recommendations.   - Patient encouraged to do daily foot exam.   -     - HTN  - BP well controlled today.     - Hyperlipidemia  - Continue with statin for hyperlipidemia. Reports no side effects from medication.      Vitamin  D insufficiently - on supplements, will repeat level    A copy of this note was sent to the patient's PCP Alma Corbin,  APNP    Follow up in 12weeks with above ordered blood work results.- Hba1c

## 2021-04-21 ENCOUNTER — OFFICE VISIT (OUTPATIENT)
Dept: INTERNAL MEDICINE | Facility: CLINIC | Age: 81
End: 2021-04-21

## 2021-04-21 VITALS
OXYGEN SATURATION: 98 % | RESPIRATION RATE: 18 BRPM | BODY MASS INDEX: 40.22 KG/M2 | DIASTOLIC BLOOD PRESSURE: 73 MMHG | SYSTOLIC BLOOD PRESSURE: 123 MMHG | HEART RATE: 58 BPM | TEMPERATURE: 97.8 F | HEIGHT: 61 IN | WEIGHT: 213 LBS

## 2021-04-21 DIAGNOSIS — J45.20 MILD INTERMITTENT REACTIVE AIRWAY DISEASE WITHOUT COMPLICATION: ICD-10-CM

## 2021-04-21 DIAGNOSIS — I10 BENIGN ESSENTIAL HTN: Primary | ICD-10-CM

## 2021-04-21 DIAGNOSIS — M17.12 PRIMARY OSTEOARTHRITIS OF LEFT KNEE: ICD-10-CM

## 2021-04-21 DIAGNOSIS — E78.00 ELEVATED CHOLESTEROL: ICD-10-CM

## 2021-04-21 LAB
ALBUMIN SERPL-MCNC: 4.1 G/DL (ref 3.5–5.2)
ALBUMIN/GLOB SERPL: 2 G/DL
ALP SERPL-CCNC: 76 U/L (ref 39–117)
ALT SERPL-CCNC: 14 U/L (ref 1–33)
APPEARANCE UR: CLEAR
AST SERPL-CCNC: 16 U/L (ref 1–32)
BACTERIA #/AREA URNS HPF: ABNORMAL /HPF
BASOPHILS # BLD AUTO: 0.02 10*3/MM3 (ref 0–0.2)
BASOPHILS NFR BLD AUTO: 0.4 % (ref 0–1.5)
BILIRUB SERPL-MCNC: 0.4 MG/DL (ref 0–1.2)
BILIRUB UR QL STRIP: NEGATIVE
BUN SERPL-MCNC: 17 MG/DL (ref 8–23)
BUN/CREAT SERPL: 18.3 (ref 7–25)
CALCIUM SERPL-MCNC: 9.8 MG/DL (ref 8.6–10.5)
CASTS URNS MICRO: ABNORMAL
CHLORIDE SERPL-SCNC: 102 MMOL/L (ref 98–107)
CHOLEST SERPL-MCNC: 152 MG/DL (ref 0–200)
CHOLEST/HDLC SERPL: 3.17 {RATIO}
CO2 SERPL-SCNC: 33 MMOL/L (ref 22–29)
COLOR UR: ABNORMAL
CREAT SERPL-MCNC: 0.93 MG/DL (ref 0.57–1)
EOSINOPHIL # BLD AUTO: 0.17 10*3/MM3 (ref 0–0.4)
EOSINOPHIL NFR BLD AUTO: 3.6 % (ref 0.3–6.2)
EPI CELLS #/AREA URNS HPF: ABNORMAL /HPF
ERYTHROCYTE [DISTWIDTH] IN BLOOD BY AUTOMATED COUNT: 13.9 % (ref 12.3–15.4)
GLOBULIN SER CALC-MCNC: 2.1 GM/DL
GLUCOSE SERPL-MCNC: 90 MG/DL (ref 65–99)
GLUCOSE UR QL: NEGATIVE
HCT VFR BLD AUTO: 40.5 % (ref 34–46.6)
HDLC SERPL-MCNC: 48 MG/DL (ref 40–60)
HGB BLD-MCNC: 12.8 G/DL (ref 12–15.9)
HGB UR QL STRIP: NEGATIVE
IMM GRANULOCYTES # BLD AUTO: 0.01 10*3/MM3 (ref 0–0.05)
IMM GRANULOCYTES NFR BLD AUTO: 0.2 % (ref 0–0.5)
KETONES UR QL STRIP: NEGATIVE
LDLC SERPL CALC-MCNC: 93 MG/DL (ref 0–100)
LEUKOCYTE ESTERASE UR QL STRIP: ABNORMAL
LYMPHOCYTES # BLD AUTO: 1.35 10*3/MM3 (ref 0.7–3.1)
LYMPHOCYTES NFR BLD AUTO: 28.2 % (ref 19.6–45.3)
MCH RBC QN AUTO: 27.4 PG (ref 26.6–33)
MCHC RBC AUTO-ENTMCNC: 31.6 G/DL (ref 31.5–35.7)
MCV RBC AUTO: 86.7 FL (ref 79–97)
MONOCYTES # BLD AUTO: 0.28 10*3/MM3 (ref 0.1–0.9)
MONOCYTES NFR BLD AUTO: 5.9 % (ref 5–12)
NEUTROPHILS # BLD AUTO: 2.95 10*3/MM3 (ref 1.7–7)
NEUTROPHILS NFR BLD AUTO: 61.7 % (ref 42.7–76)
NITRITE UR QL STRIP: NEGATIVE
NRBC BLD AUTO-RTO: 0 /100 WBC (ref 0–0.2)
PH UR STRIP: 6.5 [PH] (ref 5–8)
PLATELET # BLD AUTO: 198 10*3/MM3 (ref 140–450)
POTASSIUM SERPL-SCNC: 4.1 MMOL/L (ref 3.5–5.2)
PROT SERPL-MCNC: 6.2 G/DL (ref 6–8.5)
PROT UR QL STRIP: NEGATIVE
RBC # BLD AUTO: 4.67 10*6/MM3 (ref 3.77–5.28)
RBC #/AREA URNS HPF: ABNORMAL /HPF
SODIUM SERPL-SCNC: 142 MMOL/L (ref 136–145)
SP GR UR: 1.02 (ref 1–1.03)
T4 FREE SERPL-MCNC: 1.18 NG/DL (ref 0.93–1.7)
TRIGL SERPL-MCNC: 52 MG/DL (ref 0–150)
TSH SERPL DL<=0.005 MIU/L-ACNC: 2.78 UIU/ML (ref 0.27–4.2)
UROBILINOGEN UR STRIP-MCNC: ABNORMAL MG/DL
VIT B12 SERPL-MCNC: 687 PG/ML (ref 211–946)
VLDLC SERPL CALC-MCNC: 11 MG/DL (ref 5–40)
WBC # BLD AUTO: 4.78 10*3/MM3 (ref 3.4–10.8)
WBC #/AREA URNS HPF: ABNORMAL /HPF

## 2021-04-21 PROCEDURE — 99214 OFFICE O/P EST MOD 30 MIN: CPT | Performed by: FAMILY MEDICINE

## 2021-04-21 NOTE — PROGRESS NOTES
"Chief Complaint  Hypertension    Subjective          Loulou Collier presents to [facility]  Very pleasant lady with treatment of blood pressure mild reactive airway disease which is quiesced sent as well asHistory of elevated cholesterol generalized osteoarthritis history of knee replacement bilaterally.    She has gained some weight during the pandemic we discussed diet and exercise.      Objective   Vital Signs:   /73 (BP Location: Right arm, Patient Position: Sitting, Cuff Size: Large Adult)   Pulse 58   Temp 97.8 °F (36.6 °C)   Resp 18   Ht 154.9 cm (61\")   Wt 96.6 kg (213 lb)   SpO2 98%   BMI 40.25 kg/m²     Physical Exam  Vitals reviewed.   Constitutional:       Appearance: She is well-developed.   HENT:      Head: Normocephalic and atraumatic.      Right Ear: Tympanic membrane and external ear normal.      Left Ear: Tympanic membrane and external ear normal.   Eyes:      Conjunctiva/sclera: Conjunctivae normal.      Pupils: Pupils are equal, round, and reactive to light.   Neck:      Thyroid: No thyromegaly.      Vascular: No JVD.   Cardiovascular:      Rate and Rhythm: Normal rate and regular rhythm.      Heart sounds: Normal heart sounds.   Pulmonary:      Effort: Pulmonary effort is normal.      Breath sounds: Normal breath sounds.   Abdominal:      General: Bowel sounds are normal.      Palpations: Abdomen is soft.   Musculoskeletal:      Cervical back: Normal range of motion and neck supple.      Right knee: Decreased range of motion.      Left knee: Decreased range of motion.   Lymphadenopathy:      Cervical: No cervical adenopathy.   Skin:     General: Skin is warm and dry.      Findings: No rash.   Neurological:      Mental Status: She is alert and oriented to person, place, and time.      Cranial Nerves: No cranial nerve deficit.      Coordination: Coordination normal.   Psychiatric:         Behavior: Behavior normal.         Thought Content: Thought " content normal.         Judgment: Judgment normal.        Result Review :   The following data was reviewed by: Abebe Mancilla Jr., MD on 04/21/2021:  Common labs    Common Labsle 10/15/20 10/15/20 10/15/20    1110 1110 1110   Glucose  89    BUN  21    Creatinine  0.94    eGFR Non  Am  58 (A)    eGFR African Am  70    Sodium  143    Potassium  4.3    Chloride  106    Calcium  9.4    Total Protein  6.2    Albumin  4.10    Total Bilirubin  0.3    Alkaline Phosphatase  78    AST (SGOT)  17    ALT (SGPT)  17    WBC 5.13     Hemoglobin 12.7     Hematocrit 39.4     Platelets 204     Total Cholesterol   168   Triglycerides   64   HDL Cholesterol   50   LDL Cholesterol    106 (A)   (A) Abnormal value       Comments are available for some flowsheets but are not being displayed.                     Assessment and Plan    Diagnoses and all orders for this visit:    1. Benign essential HTN (Primary)  -     CBC & Differential  -     Comprehensive Metabolic Panel  -     Lipid Panel With / Chol / HDL Ratio  -     Urinalysis With Microscopic If Indicated (No Culture) - Urine, Clean Catch  -     TSH  -     T4, Free  -     Urinalysis With Culture If Indicated -  -     Vitamin B12    2. Elevated cholesterol  -     CBC & Differential  -     Comprehensive Metabolic Panel  -     Lipid Panel With / Chol / HDL Ratio  -     Urinalysis With Microscopic If Indicated (No Culture) - Urine, Clean Catch  -     TSH  -     T4, Free  -     Urinalysis With Culture If Indicated -  -     Vitamin B12    3. Primary osteoarthritis of left knee  -     CBC & Differential  -     Comprehensive Metabolic Panel  -     Lipid Panel With / Chol / HDL Ratio  -     Urinalysis With Microscopic If Indicated (No Culture) - Urine, Clean Catch  -     TSH  -     T4, Free  -     Urinalysis With Culture If Indicated -  -     Vitamin B12    4. Mild intermittent reactive airway disease without complication        Follow Up   No follow-ups on file.  Patient was given  instructions and counseling regarding her condition or for health maintenance advice. Please see specific information pulled into the AVS if appropriate.

## 2021-06-01 ENCOUNTER — HOSPITAL ENCOUNTER (OUTPATIENT)
Dept: MAMMOGRAPHY | Facility: HOSPITAL | Age: 81
Discharge: HOME | End: 2021-06-01
Admitting: FAMILY MEDICINE

## 2021-06-01 ENCOUNTER — HOSPITAL ENCOUNTER (OUTPATIENT)
Dept: BONE DENSITY | Facility: HOSPITAL | Age: 81
Discharge: HOME | End: 2021-06-01
Admitting: FAMILY MEDICINE

## 2021-06-01 DIAGNOSIS — Z12.31 ENCOUNTER FOR SCREENING MAMMOGRAM FOR BREAST CANCER: ICD-10-CM

## 2021-06-01 DIAGNOSIS — M81.0 POSTMENOPAUSAL OSTEOPOROSIS: ICD-10-CM

## 2021-06-01 PROCEDURE — 77080 DXA BONE DENSITY AXIAL: CPT

## 2021-06-01 PROCEDURE — 77067 SCR MAMMO BI INCL CAD: CPT

## 2021-06-01 PROCEDURE — 77063 BREAST TOMOSYNTHESIS BI: CPT

## 2021-06-10 ENCOUNTER — TELEPHONE (OUTPATIENT)
Dept: INTERNAL MEDICINE | Facility: CLINIC | Age: 81
End: 2021-06-10

## 2021-06-10 DIAGNOSIS — R92.8 ABNORMALITY OF LEFT BREAST ON SCREENING MAMMOGRAM: Primary | ICD-10-CM

## 2021-06-10 NOTE — TELEPHONE ENCOUNTER
Dr. Mancilla-Can you please review the mammogram that came back and place an order for additional images as indicated.    Thanks,     Von

## 2021-06-10 NOTE — TELEPHONE ENCOUNTER
Caller: Loulou Collier    Relationship: Self    Best call back number: [phone]    What is the medical concern/diagnosis:   AREAS FOUND IN LAST MAMMOGRAM     What specialty or service is being requested: MAMMOGRAM    What is the provider, practice or medical service name:     What is the office location:     What is the office phone number:     Any additional details: PATIENT WAS SENT A LETTER THAT ADVISED HER TO COME IN FOR ADDITIONAL IMAGING

## 2021-06-14 NOTE — TELEPHONE ENCOUNTER
Dr. Mancilla- It's in the chart under imaging for you to review she had it on 6/1/2021.    Thanks,    Von

## 2021-07-02 ENCOUNTER — HOSPITAL ENCOUNTER (OUTPATIENT)
Dept: ULTRASOUND IMAGING | Facility: HOSPITAL | Age: 81
Discharge: HOME | End: 2021-07-02

## 2021-07-02 ENCOUNTER — HOSPITAL ENCOUNTER (OUTPATIENT)
Dept: MAMMOGRAPHY | Facility: HOSPITAL | Age: 81
Discharge: HOME | End: 2021-07-02

## 2021-07-02 DIAGNOSIS — R92.8 ABNORMALITY OF LEFT BREAST ON SCREENING MAMMOGRAM: ICD-10-CM

## 2021-07-02 PROCEDURE — G0279 TOMOSYNTHESIS, MAMMO: HCPCS

## 2021-07-02 PROCEDURE — 77065 DX MAMMO INCL CAD UNI: CPT

## 2021-07-02 PROCEDURE — 76642 ULTRASOUND BREAST LIMITED: CPT

## 2021-07-06 ENCOUNTER — OFFICE VISIT (OUTPATIENT)
Dept: INTERNAL MEDICINE | Facility: CLINIC | Age: 81
End: 2021-07-06

## 2021-07-06 VITALS
DIASTOLIC BLOOD PRESSURE: 62 MMHG | OXYGEN SATURATION: 97 % | HEIGHT: 61 IN | TEMPERATURE: 98.2 F | HEART RATE: 66 BPM | SYSTOLIC BLOOD PRESSURE: 116 MMHG | BODY MASS INDEX: 41.16 KG/M2 | WEIGHT: 218 LBS

## 2021-07-06 DIAGNOSIS — J06.9 ACUTE URI: Primary | ICD-10-CM

## 2021-07-06 PROCEDURE — 99213 OFFICE O/P EST LOW 20 MIN: CPT | Performed by: INTERNAL MEDICINE

## 2021-07-06 RX ORDER — AZITHROMYCIN 250 MG/1
TABLET, FILM COATED ORAL
Qty: 6 TABLET | Refills: 0 | Status: SHIPPED | OUTPATIENT
Start: 2021-07-06 | End: 2021-08-10

## 2021-07-06 NOTE — PROGRESS NOTES
Chief Complaint   Patient presents with   • Cough     cough and sinus drainage   • Nasal Congestion      a lot of phlegm       Subjective   Loulou Collier is a 81 y.o. female.     History of Present Illness       She comes in for evaluation of respiratory symptoms.  She is having problems with the symptoms for at least 3 weeks, possibly going on for weeks.  The symptoms are persistent.  She has chest congestion, cough, nasal congestion, runny nose, postnasal drip, sinus pain, a little bit of wheezing.  She has not noticed any earache, fever, shortness of breath, sore throat, sputum production or tooth pain.  She is using her inhalers which helps with the wheezing.    The following portions of the patient's history were reviewed and updated as appropriate: allergies, current medications, past family history, past medical history, past social history, past surgical history and problem list.      Current Outpatient Medications on File Prior to Visit   Medication Sig Dispense Refill   • albuterol sulfate  (90 Base) MCG/ACT inhaler INHALE 2 PUFFS BY MOUTH EVERY 4 HOURS AS NEEDED FOR WHEEZING 18 g 2   • carvedilol (COREG) 25 MG tablet TAKE 1 TABLET BY MOUTH TWICE DAILY WITH MEALS FOR BLOOD PRESSURE 180 tablet 1   • Fluticasone Furoate-Vilanterol (BREO ELLIPTA) 100-25 MCG/INH inhaler Inhale 1 puff Daily. 28 each 0   • hydroCHLOROthiazide (MICROZIDE) 12.5 MG capsule TAKE 1 CAPSULE BY MOUTH EVERY MORNING 90 capsule 1   • meloxicam (MOBIC) 15 MG tablet TAKE 1 TABLET BY MOUTH EVERY DAY WITH FOOD 90 tablet 3   • Probiotic Product (ALIGN PO) Take  by mouth.     • simvastatin (ZOCOR) 40 MG tablet TAKE 1 TABLET BY MOUTH EVERY NIGHT 90 tablet 1   • TURMERIC PO Take 2,000 mg by mouth.     • VITAMIN B COMPLEX-C PO Take  by mouth.     • Wal-Fex Allergy 180 MG tablet TAKE 1 TABLET BY MOUTH DAILY FOR ALLERGIES 30 tablet 2     No current facility-administered medications on file prior to visit.               Review of Systems  "  Constitutional: Negative for appetite change.   Cardiovascular: Negative for chest pain and leg swelling.   Gastrointestinal: Negative for diarrhea, nausea and vomiting.           Objective     /62   Pulse 66   Temp 98.2 °F (36.8 °C)   Ht 154.9 cm (61\")   Wt 98.9 kg (218 lb)   SpO2 97%   BMI 41.19 kg/m²       Physical Exam  Vitals and nursing note reviewed.   Constitutional:       Appearance: Normal appearance. She is not ill-appearing.   HENT:      Right Ear: Tympanic membrane and ear canal normal.      Left Ear: Tympanic membrane and ear canal normal.      Nose: Congestion and rhinorrhea present. Rhinorrhea is clear.      Right Sinus: Maxillary sinus tenderness (mild) present.      Left Sinus: Maxillary sinus tenderness (mild) present.   Cardiovascular:      Rate and Rhythm: Normal rate and regular rhythm.      Heart sounds: Normal heart sounds. No murmur heard.   No friction rub. No gallop.    Pulmonary:      Effort: Pulmonary effort is normal.      Breath sounds: Examination of the right-upper field reveals wheezing. Examination of the left-upper field reveals wheezing. Wheezing (wheezing heard anterioly ) present. No rhonchi or rales.   Musculoskeletal:      Right lower leg: No edema.      Left lower leg: No edema.   Lymphadenopathy:      Cervical: No cervical adenopathy.   Neurological:      Mental Status: She is alert.           Assessment/Plan   Diagnoses and all orders for this visit:    1. Acute URI (Primary)    Other orders  -     azithromycin (Zithromax Z-Jamil) 250 MG tablet; Take 2 tablets the first day, then 1 tablet daily for 4 days.  Dispense: 6 tablet; Refill: 0      Discussed. She will continue inhalers, Wal-Fex as well as taking z-jamil.         "

## 2021-07-26 RX ORDER — COVID-19 ANTIGEN TEST
KIT MISCELLANEOUS
Qty: 30 TABLET | Refills: 2 | Status: SHIPPED | OUTPATIENT
Start: 2021-07-26 | End: 2021-09-13

## 2021-08-06 RX ORDER — MELOXICAM 15 MG/1
TABLET ORAL
Qty: 90 TABLET | Refills: 3 | OUTPATIENT
Start: 2021-08-06

## 2021-08-10 ENCOUNTER — OFFICE VISIT (OUTPATIENT)
Dept: INTERNAL MEDICINE | Facility: CLINIC | Age: 81
End: 2021-08-10

## 2021-08-10 VITALS
OXYGEN SATURATION: 97 % | DIASTOLIC BLOOD PRESSURE: 88 MMHG | TEMPERATURE: 97.3 F | HEART RATE: 63 BPM | HEIGHT: 61 IN | BODY MASS INDEX: 40.78 KG/M2 | WEIGHT: 216 LBS | SYSTOLIC BLOOD PRESSURE: 130 MMHG

## 2021-08-10 DIAGNOSIS — I10 BENIGN ESSENTIAL HTN: ICD-10-CM

## 2021-08-10 DIAGNOSIS — I71.20 AORTIC ANEURYSM, INTRATHORACIC (HCC): ICD-10-CM

## 2021-08-10 DIAGNOSIS — J01.10 ACUTE FRONTAL SINUSITIS, RECURRENCE NOT SPECIFIED: Primary | ICD-10-CM

## 2021-08-10 DIAGNOSIS — I77.819 ACQUIRED DILATION OF ASCENDING AORTA AND AORTIC ROOT (HCC): ICD-10-CM

## 2021-08-10 DIAGNOSIS — E78.00 ELEVATED CHOLESTEROL: ICD-10-CM

## 2021-08-10 DIAGNOSIS — E55.9 VITAMIN D DEFICIENCY: ICD-10-CM

## 2021-08-10 PROBLEM — L73.2 HYDRADENITIS: Status: ACTIVE | Noted: 2021-08-10

## 2021-08-10 PROBLEM — M17.9 DJD (DEGENERATIVE JOINT DISEASE) OF KNEE: Status: ACTIVE | Noted: 2021-08-10

## 2021-08-10 PROBLEM — D64.9 ANEMIA: Status: ACTIVE | Noted: 2021-08-10

## 2021-08-10 PROCEDURE — 99214 OFFICE O/P EST MOD 30 MIN: CPT | Performed by: FAMILY MEDICINE

## 2021-08-10 RX ORDER — CLARITHROMYCIN 500 MG/1
500 TABLET, COATED ORAL 2 TIMES DAILY
Qty: 20 TABLET | Refills: 2 | Status: SHIPPED | OUTPATIENT
Start: 2021-08-10 | End: 2021-12-06

## 2021-08-10 RX ORDER — MELOXICAM 15 MG/1
15 TABLET ORAL DAILY
Qty: 90 TABLET | Refills: 3 | Status: SHIPPED | OUTPATIENT
Start: 2021-08-10 | End: 2022-07-18

## 2021-08-10 NOTE — PROGRESS NOTES
"Chief Complaint  Hypertension (follow up ) and Hyperlipidemia    Subjective          Loulou Collier presents to [facility]  Very pleasant lady for recheck of labs done in April.  Patient clear redoing cholesterol panel otherwise she is seen pulmonary for some increased shortness of air.  She appears to be stable in that regard aside from need for weight loss.  We will get an echocardiogram to recheck from 2 years ago with dilated aortic root not a true ascending aneurysm.    Previous colonoscopy was normal in 2012 with repeat in 2022 as discussed with the patient.    She has residual chronic sinusitis which is a frontal sinusitis and will give her clarithromycin 500 twice daily #20.    Active management of hypertension is reviewed as well as lab pending for cholesterol.    She has not seen rheumatologist in 2 years no resume meloxicam 15 mg daily as needed arthralgias.      Objective   Vital Signs:   /88   Pulse 63   Temp 97.3 °F (36.3 °C)   Ht 154.9 cm (60.98\")   Wt 98 kg (216 lb)   SpO2 97%   BMI 40.83 kg/m²     Physical Exam  Vitals reviewed.   Constitutional:       Appearance: She is well-developed.   HENT:      Head: Normocephalic and atraumatic.      Right Ear: Tympanic membrane and external ear normal.      Left Ear: Tympanic membrane and external ear normal.   Eyes:      Conjunctiva/sclera: Conjunctivae normal.      Pupils: Pupils are equal, round, and reactive to light.   Neck:      Thyroid: No thyromegaly.      Vascular: No JVD.   Cardiovascular:      Rate and Rhythm: Normal rate and regular rhythm.      Heart sounds: Normal heart sounds.   Pulmonary:      Effort: Pulmonary effort is normal.      Breath sounds: Normal breath sounds.   Abdominal:      General: Bowel sounds are normal.      Palpations: Abdomen is soft.   Musculoskeletal:         General: Normal range of motion.      Cervical back: Normal range of motion and neck supple.   Lymphadenopathy:      " Cervical: No cervical adenopathy.   Skin:     General: Skin is warm and dry.      Findings: No rash.   Neurological:      Mental Status: She is alert and oriented to person, place, and time.      Cranial Nerves: No cranial nerve deficit.      Coordination: Coordination normal.   Psychiatric:         Behavior: Behavior normal.         Thought Content: Thought content normal.         Judgment: Judgment normal.        Result Review :                 Assessment and Plan    Diagnoses and all orders for this visit:    1. Acute frontal sinusitis, recurrence not specified (Primary)    2. 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 Aortic aneurysm, intrathoracic (CMS/HCC)   -     Adult Transthoracic Echo Complete W/ Cont if Necessary Per Protocol; Future    4. Benign essential HTN  -     Comprehensive Metabolic Panel  -     CBC & Differential  -     Lipid Panel With / Chol / HDL Ratio  -     Urinalysis With Microscopic If Indicated (No Culture) - Urine, Clean Catch  -     Vitamin D 25 Hydroxy    5. Elevated cholesterol  -     Comprehensive Metabolic Panel  -     CBC & Differential  -     Lipid Panel With / Chol / HDL Ratio  -     Urinalysis With Microscopic If Indicated (No Culture) - Urine, Clean Catch  -     Vitamin D 25 Hydroxy    6. Vitamin D deficiency   -     Vitamin D 25 Hydroxy    Other orders  -     meloxicam (MOBIC) 15 MG tablet; Take 1 tablet by mouth Daily. with food  Dispense: 90 tablet; Refill: 3  -     clarithromycin (Biaxin) 500 MG tablet; Take 1 tablet by mouth 2 (Two) Times a Day. For onset of sinus infection and bronchitis  Dispense: 20 tablet; Refill: 2        Follow Up   No follow-ups on file.  Patient was given instructions and counseling regarding her condition or for health maintenance advice. Please see specific information pulled into the AVS if appropriate.       Answers for HPI/ROS submitted  by the patient on 8/3/2021  Please describe your symptoms.: Review Test results.  Have you had these symptoms before?: No  How long have you been having these symptoms?: Greater than 2 weeks  Please list any medications you are currently taking for this condition.: None  Please describe any probable cause for these symptoms. : Unknown  What is the primary reason for your visit?: Other

## 2021-08-11 LAB
25(OH)D3+25(OH)D2 SERPL-MCNC: 47.8 NG/ML (ref 30–100)
ALBUMIN SERPL-MCNC: 4.3 G/DL (ref 3.5–5.2)
ALBUMIN/GLOB SERPL: 1.9 G/DL
ALP SERPL-CCNC: 83 U/L (ref 39–117)
ALT SERPL-CCNC: 16 U/L (ref 1–33)
APPEARANCE UR: CLEAR
AST SERPL-CCNC: 19 U/L (ref 1–32)
BASOPHILS # BLD AUTO: 0.02 10*3/MM3 (ref 0–0.2)
BASOPHILS NFR BLD AUTO: 0.3 % (ref 0–1.5)
BILIRUB SERPL-MCNC: 0.3 MG/DL (ref 0–1.2)
BILIRUB UR QL STRIP: NEGATIVE
BUN SERPL-MCNC: 16 MG/DL (ref 8–23)
BUN/CREAT SERPL: 18 (ref 7–25)
CALCIUM SERPL-MCNC: 10.1 MG/DL (ref 8.6–10.5)
CHLORIDE SERPL-SCNC: 101 MMOL/L (ref 98–107)
CHOLEST SERPL-MCNC: 174 MG/DL (ref 0–200)
CHOLEST/HDLC SERPL: 3.55 {RATIO}
CO2 SERPL-SCNC: 31.6 MMOL/L (ref 22–29)
COLOR UR: YELLOW
CREAT SERPL-MCNC: 0.89 MG/DL (ref 0.57–1)
EOSINOPHIL # BLD AUTO: 0.22 10*3/MM3 (ref 0–0.4)
EOSINOPHIL NFR BLD AUTO: 3.5 % (ref 0.3–6.2)
ERYTHROCYTE [DISTWIDTH] IN BLOOD BY AUTOMATED COUNT: 14 % (ref 12.3–15.4)
GLOBULIN SER CALC-MCNC: 2.3 GM/DL
GLUCOSE SERPL-MCNC: 91 MG/DL (ref 65–99)
GLUCOSE UR QL: NEGATIVE
HCT VFR BLD AUTO: 42.6 % (ref 34–46.6)
HDLC SERPL-MCNC: 49 MG/DL (ref 40–60)
HGB BLD-MCNC: 13.2 G/DL (ref 12–15.9)
HGB UR QL STRIP: NEGATIVE
IMM GRANULOCYTES # BLD AUTO: 0.01 10*3/MM3 (ref 0–0.05)
IMM GRANULOCYTES NFR BLD AUTO: 0.2 % (ref 0–0.5)
KETONES UR QL STRIP: NEGATIVE
LDLC SERPL CALC-MCNC: 113 MG/DL (ref 0–100)
LEUKOCYTE ESTERASE UR QL STRIP: NEGATIVE
LYMPHOCYTES # BLD AUTO: 1.92 10*3/MM3 (ref 0.7–3.1)
LYMPHOCYTES NFR BLD AUTO: 30.6 % (ref 19.6–45.3)
MCH RBC QN AUTO: 27.3 PG (ref 26.6–33)
MCHC RBC AUTO-ENTMCNC: 31 G/DL (ref 31.5–35.7)
MCV RBC AUTO: 88 FL (ref 79–97)
MONOCYTES # BLD AUTO: 0.39 10*3/MM3 (ref 0.1–0.9)
MONOCYTES NFR BLD AUTO: 6.2 % (ref 5–12)
NEUTROPHILS # BLD AUTO: 3.71 10*3/MM3 (ref 1.7–7)
NEUTROPHILS NFR BLD AUTO: 59.2 % (ref 42.7–76)
NITRITE UR QL STRIP: NEGATIVE
NRBC BLD AUTO-RTO: 0 /100 WBC (ref 0–0.2)
PH UR STRIP: 6.5 [PH] (ref 5–8)
PLATELET # BLD AUTO: 199 10*3/MM3 (ref 140–450)
POTASSIUM SERPL-SCNC: 4.1 MMOL/L (ref 3.5–5.2)
PROT SERPL-MCNC: 6.6 G/DL (ref 6–8.5)
PROT UR QL STRIP: NEGATIVE
RBC # BLD AUTO: 4.84 10*6/MM3 (ref 3.77–5.28)
SODIUM SERPL-SCNC: 144 MMOL/L (ref 136–145)
SP GR UR: 1.02 (ref 1–1.03)
TRIGL SERPL-MCNC: 63 MG/DL (ref 0–150)
UROBILINOGEN UR STRIP-MCNC: NORMAL MG/DL
VLDLC SERPL CALC-MCNC: 12 MG/DL (ref 5–40)
WBC # BLD AUTO: 6.27 10*3/MM3 (ref 3.4–10.8)

## 2021-09-11 DIAGNOSIS — J30.1 SEASONAL ALLERGIC RHINITIS DUE TO POLLEN: Primary | ICD-10-CM

## 2021-09-13 RX ORDER — COVID-19 ANTIGEN TEST
KIT MISCELLANEOUS
Qty: 30 TABLET | Refills: 2 | Status: SHIPPED | OUTPATIENT
Start: 2021-09-13 | End: 2021-12-06 | Stop reason: SDUPTHER

## 2021-09-14 ENCOUNTER — TELEPHONE (OUTPATIENT)
Dept: INTERNAL MEDICINE | Facility: CLINIC | Age: 81
End: 2021-09-14

## 2021-09-14 NOTE — TELEPHONE ENCOUNTER
Caller: Loulou Collier    Relationship: Self    Best call back number: [phone]    What is the best time to reach you: ANYTIME     Who are you requesting to speak with (clinical staff, provider,  specific staff member): VICTOR HUGO     Do you know the name of the person who called: N/A     What was the call regarding: BOOSTER SHOT     Do you require a callback: YES

## 2021-09-16 ENCOUNTER — HOSPITAL ENCOUNTER (OUTPATIENT)
Dept: CARDIOLOGY | Facility: HOSPITAL | Age: 81
Discharge: HOME | End: 2021-09-16
Admitting: FAMILY MEDICINE

## 2021-09-16 VITALS
HEIGHT: 61 IN | WEIGHT: 216 LBS | BODY MASS INDEX: 40.78 KG/M2 | SYSTOLIC BLOOD PRESSURE: 130 MMHG | DIASTOLIC BLOOD PRESSURE: 88 MMHG

## 2021-09-16 DIAGNOSIS — I71.20 AORTIC ANEURYSM, INTRATHORACIC (HCC): ICD-10-CM

## 2021-09-16 DIAGNOSIS — I77.819 ACQUIRED DILATION OF ASCENDING AORTA AND AORTIC ROOT (HCC): ICD-10-CM

## 2021-09-16 LAB
AORTIC DIMENSIONLESS INDEX: 0.8 (DI)
ASCENDING AORTA: 4.3 CM
BH CV ECHO MEAS - ACS: 2 CM
BH CV ECHO MEAS - AO MAX PG (FULL): 2.5 MMHG
BH CV ECHO MEAS - AO MAX PG: 5.1 MMHG
BH CV ECHO MEAS - AO MEAN PG (FULL): 2 MMHG
BH CV ECHO MEAS - AO MEAN PG: 3 MMHG
BH CV ECHO MEAS - AO ROOT AREA (BSA CORRECTED): 1.9
BH CV ECHO MEAS - AO ROOT AREA: 10.8 CM^2
BH CV ECHO MEAS - AO ROOT DIAM: 4 CM
BH CV ECHO MEAS - AO V2 MAX: 113 CM/SEC
BH CV ECHO MEAS - AO V2 MEAN: 77.2 CM/SEC
BH CV ECHO MEAS - AO V2 VTI: 24.5 CM
BH CV ECHO MEAS - ASC AORTA: 4.3 CM
BH CV ECHO MEAS - AVA(I,A): 2.9 CM^2
BH CV ECHO MEAS - AVA(I,D): 2.9 CM^2
BH CV ECHO MEAS - AVA(V,A): 2.5 CM^2
BH CV ECHO MEAS - AVA(V,D): 2.5 CM^2
BH CV ECHO MEAS - BSA(HAYCOCK): 2.1 M^2
BH CV ECHO MEAS - BSA: 2 M^2
BH CV ECHO MEAS - BZI_BMI: 40.8 KILOGRAMS/M^2
BH CV ECHO MEAS - BZI_METRIC_HEIGHT: 154.9 CM
BH CV ECHO MEAS - BZI_METRIC_WEIGHT: 98 KG
BH CV ECHO MEAS - EDV(CUBED): 117.6 ML
BH CV ECHO MEAS - EDV(MOD-SP2): 81 ML
BH CV ECHO MEAS - EDV(MOD-SP4): 112 ML
BH CV ECHO MEAS - EDV(TEICH): 112.8 ML
BH CV ECHO MEAS - EF(CUBED): 60.3 %
BH CV ECHO MEAS - EF(MOD-BP): 64.2 %
BH CV ECHO MEAS - EF(MOD-SP2): 64.2 %
BH CV ECHO MEAS - EF(MOD-SP4): 64.3 %
BH CV ECHO MEAS - EF(TEICH): 51.8 %
BH CV ECHO MEAS - ESV(CUBED): 46.7 ML
BH CV ECHO MEAS - ESV(MOD-SP2): 29 ML
BH CV ECHO MEAS - ESV(MOD-SP4): 40 ML
BH CV ECHO MEAS - ESV(TEICH): 54.4 ML
BH CV ECHO MEAS - FS: 26.5 %
BH CV ECHO MEAS - IVS/LVPW: 1.1
BH CV ECHO MEAS - IVSD: 1.3 CM
BH CV ECHO MEAS - LAT PEAK E' VEL: 6.3 CM/SEC
BH CV ECHO MEAS - LV DIASTOLIC VOL/BSA (35-75): 57.4 ML/M^2
BH CV ECHO MEAS - LV MASS(C)D: 239.9 GRAMS
BH CV ECHO MEAS - LV MASS(C)DI: 122.9 GRAMS/M^2
BH CV ECHO MEAS - LV MAX PG: 2.6 MMHG
BH CV ECHO MEAS - LV MEAN PG: 1 MMHG
BH CV ECHO MEAS - LV SYSTOLIC VOL/BSA (12-30): 20.5 ML/M^2
BH CV ECHO MEAS - LV V1 MAX: 80.3 CM/SEC
BH CV ECHO MEAS - LV V1 MEAN: 48.2 CM/SEC
BH CV ECHO MEAS - LV V1 VTI: 20.6 CM
BH CV ECHO MEAS - LVIDD: 4.9 CM
BH CV ECHO MEAS - LVIDS: 3.6 CM
BH CV ECHO MEAS - LVLD AP2: 6.3 CM
BH CV ECHO MEAS - LVLD AP4: 7 CM
BH CV ECHO MEAS - LVLS AP2: 5 CM
BH CV ECHO MEAS - LVLS AP4: 5.5 CM
BH CV ECHO MEAS - LVOT AREA (M): 3.5 CM^2
BH CV ECHO MEAS - LVOT AREA: 3.5 CM^2
BH CV ECHO MEAS - LVOT DIAM: 2.1 CM
BH CV ECHO MEAS - LVPWD: 1.2 CM
BH CV ECHO MEAS - MED PEAK E' VEL: 3.4 CM/SEC
BH CV ECHO MEAS - MV A MAX VEL: 88.3 CM/SEC
BH CV ECHO MEAS - MV DEC SLOPE: 404.5 CM/SEC^2
BH CV ECHO MEAS - MV DEC TIME: 199 SEC
BH CV ECHO MEAS - MV E MAX VEL: 94.7 CM/SEC
BH CV ECHO MEAS - MV E/A: 1.1
BH CV ECHO MEAS - MV MAX PG: 3.4 MMHG
BH CV ECHO MEAS - MV MEAN PG: 1 MMHG
BH CV ECHO MEAS - MV P1/2T MAX VEL: 97.7 CM/SEC
BH CV ECHO MEAS - MV P1/2T: 70.7 MSEC
BH CV ECHO MEAS - MV V2 MAX: 91.8 CM/SEC
BH CV ECHO MEAS - MV V2 MEAN: 55.6 CM/SEC
BH CV ECHO MEAS - MV V2 VTI: 31.7 CM
BH CV ECHO MEAS - MVA P1/2T LCG: 2.3 CM^2
BH CV ECHO MEAS - MVA(P1/2T): 3.1 CM^2
BH CV ECHO MEAS - MVA(VTI): 2.3 CM^2
BH CV ECHO MEAS - RAP SYSTOLE: 3 MMHG
BH CV ECHO MEAS - RVOT AREA: 7.5 CM^2
BH CV ECHO MEAS - RVOT DIAM: 3.1 CM
BH CV ECHO MEAS - RVSP: 32 MMHG
BH CV ECHO MEAS - SI(AO): 135 ML/M^2
BH CV ECHO MEAS - SI(CUBED): 36.4 ML/M^2
BH CV ECHO MEAS - SI(LVOT): 36.6 ML/M^2
BH CV ECHO MEAS - SI(MOD-SP2): 26.6 ML/M^2
BH CV ECHO MEAS - SI(MOD-SP4): 36.9 ML/M^2
BH CV ECHO MEAS - SI(TEICH): 29.9 ML/M^2
BH CV ECHO MEAS - SV(AO): 263.4 ML
BH CV ECHO MEAS - SV(CUBED): 71 ML
BH CV ECHO MEAS - SV(LVOT): 71.4 ML
BH CV ECHO MEAS - SV(MOD-SP2): 52 ML
BH CV ECHO MEAS - SV(MOD-SP4): 72 ML
BH CV ECHO MEAS - SV(TEICH): 58.4 ML
BH CV ECHO MEAS - TAPSE (>1.6): 1.9 CM
BH CV ECHO MEAS - TR MAX VEL: 270 CM/SEC
BH CV ECHO MEASUREMENTS AVERAGE E/E' RATIO: 19.53
BH CV XLRA - TDI S': 11.2 CM/SEC
LEFT ATRIUM VOLUME INDEX: 34.6 ML/M2
MAXIMAL PREDICTED HEART RATE: 141 BPM
SINUS: 3.9 CM
STRESS TARGET HR: 120 BPM

## 2021-09-16 PROCEDURE — 93306 TTE W/DOPPLER COMPLETE: CPT

## 2021-09-16 PROCEDURE — 93306 TTE W/DOPPLER COMPLETE: CPT | Performed by: INTERNAL MEDICINE

## 2021-10-20 ENCOUNTER — TELEPHONE (OUTPATIENT)
Dept: INTERNAL MEDICINE | Facility: CLINIC | Age: 81
End: 2021-10-20

## 2021-10-20 DIAGNOSIS — I10 BENIGN ESSENTIAL HTN: Primary | ICD-10-CM

## 2021-10-20 RX ORDER — CARVEDILOL 25 MG/1
25 TABLET ORAL 2 TIMES DAILY WITH MEALS
Qty: 180 TABLET | Refills: 1 | Status: SHIPPED | OUTPATIENT
Start: 2021-10-20 | End: 2022-07-11

## 2021-10-20 NOTE — TELEPHONE ENCOUNTER
Caller: Loulou Collier    Relationship: Self        Requested Prescriptions:   Requested Prescriptions     Pending Prescriptions Disp Refills   • carvedilol (COREG) 25 MG tablet 180 tablet 1        Pharmacy where request should be sent:[facility][id number] [address]  AT [address] [facility] [address] - [phone]  - [phone]   [phone]    Additional details provided by patient:[facility] HAS 4 PILLS LEFT    Best call back number:[phone] (M)    Does the patient have less than a 3 day supply:  [x] Yes  [] No    Matt Henao Rep   10/20/21 09:23 EDT

## 2021-12-06 ENCOUNTER — OFFICE VISIT (OUTPATIENT)
Dept: INTERNAL MEDICINE | Facility: CLINIC | Age: 81
End: 2021-12-06

## 2021-12-06 VITALS
OXYGEN SATURATION: 99 % | TEMPERATURE: 97.6 F | SYSTOLIC BLOOD PRESSURE: 126 MMHG | HEART RATE: 73 BPM | BODY MASS INDEX: 40.22 KG/M2 | WEIGHT: 213 LBS | HEIGHT: 61 IN | DIASTOLIC BLOOD PRESSURE: 72 MMHG

## 2021-12-06 DIAGNOSIS — Z00.00 MEDICARE ANNUAL WELLNESS VISIT, SUBSEQUENT: ICD-10-CM

## 2021-12-06 DIAGNOSIS — J45.20 MILD INTERMITTENT ASTHMA, UNSPECIFIED WHETHER COMPLICATED: ICD-10-CM

## 2021-12-06 DIAGNOSIS — E78.00 ELEVATED CHOLESTEROL: ICD-10-CM

## 2021-12-06 DIAGNOSIS — I10 BENIGN ESSENTIAL HTN: Primary | ICD-10-CM

## 2021-12-06 DIAGNOSIS — J30.1 SEASONAL ALLERGIC RHINITIS DUE TO POLLEN: ICD-10-CM

## 2021-12-06 DIAGNOSIS — E55.9 HYPOVITAMINOSIS D: ICD-10-CM

## 2021-12-06 DIAGNOSIS — R73.09 ELEVATED GLUCOSE LEVEL: ICD-10-CM

## 2021-12-06 DIAGNOSIS — Z12.11 COLON CANCER SCREENING: ICD-10-CM

## 2021-12-06 LAB
BILIRUB UR QL STRIP: NEGATIVE
CLARITY UR: CLEAR
COLOR UR: YELLOW
GLUCOSE UR STRIP-MCNC: NEGATIVE MG/DL
HGB UR QL STRIP.AUTO: NEGATIVE
KETONES UR QL STRIP: ABNORMAL
LEUKOCYTE ESTERASE UR QL STRIP.AUTO: NEGATIVE
NITRITE UR QL STRIP: NEGATIVE
PH UR STRIP.AUTO: 5.5 [PH] (ref 5–8)
PROT UR QL STRIP: NEGATIVE
SP GR UR STRIP: >=1.03 (ref 1–1.03)
UROBILINOGEN UR QL STRIP: ABNORMAL

## 2021-12-06 PROCEDURE — 1170F FXNL STATUS ASSESSED: CPT | Performed by: FAMILY MEDICINE

## 2021-12-06 PROCEDURE — 1126F AMNT PAIN NOTED NONE PRSNT: CPT | Performed by: FAMILY MEDICINE

## 2021-12-06 PROCEDURE — G0439 PPPS, SUBSEQ VISIT: HCPCS | Performed by: FAMILY MEDICINE

## 2021-12-06 PROCEDURE — 1159F MED LIST DOCD IN RCRD: CPT | Performed by: FAMILY MEDICINE

## 2021-12-06 PROCEDURE — 99214 OFFICE O/P EST MOD 30 MIN: CPT | Performed by: FAMILY MEDICINE

## 2021-12-06 PROCEDURE — 81003 URINALYSIS AUTO W/O SCOPE: CPT | Performed by: FAMILY MEDICINE

## 2021-12-06 RX ORDER — FEXOFENADINE HCL 180 MG/1
180 TABLET ORAL DAILY
Qty: 90 TABLET | Refills: 3 | Status: SHIPPED | OUTPATIENT
Start: 2021-12-06

## 2021-12-06 NOTE — PROGRESS NOTES
The ABCs of the Annual Wellness Visit  Subsequent Medicare Wellness Visit    Chief Complaint   Patient presents with   • Medicare Wellness-subsequent      Subjective    History of Present Illness:  Loulou Collier is a 81 y.o. female who presents for a Subsequent Medicare Wellness Visit.    The following portions of the patient's history were reviewed and   updated as appropriate: allergies, current medications, past family history, past medical history, past social history, past surgical history and problem list.    Compared to one year ago, the patient feels her physical   health is the same.    Compared to one year ago, the patient feels her mental   health is the same.    Recent Hospitalizations:  She was not admitted to the hospital during the last year.       Current Medical Providers:  Patient Care Team:  Abebe Mancilla Jr., MD as PCP - General (Family Medicine)    Outpatient Medications Prior to Visit   Medication Sig Dispense Refill   • albuterol sulfate  (90 Base) MCG/ACT inhaler INHALE 2 PUFFS BY MOUTH EVERY 4 HOURS AS NEEDED FOR WHEEZING 18 g 2   • carvedilol (COREG) 25 MG tablet Take 1 tablet by mouth 2 (Two) Times a Day With Meals. 180 tablet 1   • Fluticasone Furoate-Vilanterol (BREO ELLIPTA) 100-25 MCG/INH inhaler Inhale 1 puff Daily. 28 each 0   • hydroCHLOROthiazide (MICROZIDE) 12.5 MG capsule TAKE 1 CAPSULE BY MOUTH EVERY MORNING 90 capsule 1   • meloxicam (MOBIC) 15 MG tablet Take 1 tablet by mouth Daily. with food 90 tablet 3   • Probiotic Product (ALIGN PO) Take  by mouth.     • simvastatin (ZOCOR) 40 MG tablet TAKE 1 TABLET BY MOUTH EVERY NIGHT 90 tablet 1   • TURMERIC PO Take 2,000 mg by mouth.     • VITAMIN B COMPLEX-C PO Take  by mouth.     • Wal-Fex Allergy 180 MG tablet TAKE 1 TABLET BY MOUTH DAILY FOR ALLERGIES 30 tablet 2   • clarithromycin (Biaxin) 500 MG tablet Take 1 tablet by mouth 2 (Two) Times a Day. For onset of sinus infection and bronchitis 20 tablet 2     No  "facility-administered medications prior to visit.       No opioid medication identified on active medication list. I have reviewed chart for other potential  high risk medication/s and harmful drug interactions in the elderly.          Aspirin is not on active medication list.  Aspirin use is not indicated based on review of current medical condition/s. Risk of harm outweighs potential benefits.  .    Patient Active Problem List   Diagnosis   • Elevated cholesterol   • Benign essential HTN   • H/O hysterectomy for benign disease   • Primary osteoarthritis of knee   • History of total knee arthroplasty, left   • Cervical spine crepitus   • Iron deficiency anemia due to chronic blood loss   • Need for vaccination   • Aortic root dilation (HCC)   • Pulmonary nodule   • Hydradenitis   • Anemia   • DJD (degenerative joint disease) of knee   • Asthma     Advance Care Planning  Advance Directive is not on file.  ACP discussion was held with the patient during this visit. Patient does not have an advance directive, information provided.          Objective    Vitals:    12/06/21 1358   BP: 126/72   Pulse: 73   Temp: 97.6 °F (36.4 °C)   SpO2: 99%   Weight: 96.6 kg (213 lb)   Height: 154.9 cm (61\")   PainSc: 0-No pain     BMI Readings from Last 1 Encounters:   12/06/21 40.25 kg/m²   BMI is above normal parameters. Recommendations include: nutrition counseling    Does the patient have evidence of cognitive impairment? No    Physical Exam            HEALTH RISK ASSESSMENT    Smoking Status:  Social History     Tobacco Use   Smoking Status Never Smoker   Smokeless Tobacco Never Used     Alcohol Consumption:  Social History     Substance and Sexual Activity   Alcohol Use No     Fall Risk Screen:    STEADI Fall Risk Assessment was completed, and patient is at LOW risk for falls.Assessment completed on:12/6/2021    Depression Screening:  PHQ-2/PHQ-9 Depression Screening 12/6/2021   Little interest or pleasure in doing things 0 "   Feeling down, depressed, or hopeless 0   Trouble falling or staying asleep, or sleeping too much 0   Feeling tired or having little energy 0   Poor appetite or overeating 0   Feeling bad about yourself - or that you are a failure or have let yourself or your family down 0   Trouble concentrating on things, such as reading the newspaper or watching television 0   Moving or speaking so slowly that other people could have noticed. Or the opposite - being so fidgety or restless that you have been moving around a lot more than usual 0   Thoughts that you would be better off dead, or of hurting yourself in some way 0   Total Score 0   If you checked off any problems, how difficult have these problems made it for you to do your work, take care of things at home, or get along with other people? Not difficult at all       Health Habits and Functional and Cognitive Screening:  Functional & Cognitive Status 12/6/2021   Do you have difficulty preparing food and eating? No   Do you have difficulty bathing yourself, getting dressed or grooming yourself? No   Do you have difficulty using the toilet? No   Do you have difficulty moving around from place to place? No   Do you have trouble with steps or getting out of a bed or a chair? No   Current Diet Well Balanced Diet   Dental Exam Up to date   Eye Exam Up to date   Exercise (times per week) 3 times per week   Current Exercises Include Walking        Exercise Comment  once a week   Do you need help using the phone?  No   Are you deaf or do you have serious difficulty hearing?  No   Do you need help with transportation? No   Do you need help shopping? No   Do you need help preparing meals?  No   Do you need help with housework?  No   Do you need help with laundry? No   Do you need help taking your medications? No   Do you need help managing money? No   Do you ever drive or ride in a car without wearing a seat belt? No   Have you felt unusual stress, anger or loneliness in  the last month? No   Who do you live with? Child   If you need help, do you have trouble finding someone available to you? No   Have you been bothered in the last four weeks by sexual problems? No   Do you have difficulty concentrating, remembering or making decisions? No       Age-appropriate Screening Schedule:  Refer to the list below for future screening recommendations based on patient's age, sex and/or medical conditions. Orders for these recommended tests are listed in the plan section. The patient has been provided with a written plan.    Health Maintenance   Topic Date Due   • TDAP/TD VACCINES (1 - Tdap) Never done   • INFLUENZA VACCINE  08/01/2021   • LIPID PANEL  08/10/2022   • DXA SCAN  06/01/2023   • MAMMOGRAM  07/02/2023   • ZOSTER VACCINE  Completed              Assessment/Plan   CMS Preventative Services Quick Reference  Risk Factors Identified During Encounter  Cardiovascular Disease  Obesity/Overweight   The above risks/problems have been discussed with the patient.  Follow up actions/plans if indicated are seen below in the Assessment/Plan Section.  Pertinent information has been shared with the patient in the After Visit Summary.    Diagnoses and all orders for this visit:    1. Benign essential HTN (Primary)  -     Comprehensive Metabolic Panel  -     CBC & Differential  -     Hemoglobin A1c  -     Lipid Panel With / Chol / HDL Ratio  -     Folate  -     Vitamin B12  -     Vitamin D 25 Hydroxy  -     Urinalysis With Microscopic If Indicated (No Culture) - Urine, Clean Catch  -     TSH  -     T4, Free    2. Seasonal allergic rhinitis due to pollen  -     fexofenadine (Wal-Fex Allergy) 180 MG tablet; Take 1 tablet by mouth Daily.  Dispense: 90 tablet; Refill: 3    3. Elevated cholesterol  -     Comprehensive Metabolic Panel  -     CBC & Differential  -     Hemoglobin A1c  -     Lipid Panel With / Chol / HDL Ratio  -     Folate  -     Vitamin B12  -     Vitamin D 25 Hydroxy  -     Urinalysis With  Microscopic If Indicated (No Culture) - Urine, Clean Catch  -     TSH  -     T4, Free    4. Colon cancer screening  -     Ambulatory Referral For Screening Colonoscopy    5. Mild intermittent asthma, unspecified whether complicated    6. Hypovitaminosis D  -     Vitamin D 25 Hydroxy    7. Elevated glucose level  -     Hemoglobin A1c        Follow Up:   Return in about 6 months (around 6/6/2022) for Recheck.     An After Visit Summary and PPPS were made available to the patient.

## 2021-12-06 NOTE — PROGRESS NOTES
"Chief Complaint  Medicare Wellness-subsequent    Subjective          Loulou Collier presents to [facility]  Very pleasant lady with history of hypertension hyperlipidemia allergic rhinitis mild intermittent asthma with active management all the above problems including treatment of allergies with fexofenadine 80 mg daily.    We discussed the need for repeat colon cancer screening given 10-year lynsey of prior colonoscopy based on records from Dr. Valenzuela.      Objective   Vital Signs:   /72   Pulse 73   Temp 97.6 °F (36.4 °C)   Ht 154.9 cm (61\")   Wt 96.6 kg (213 lb)   SpO2 99%   BMI 40.25 kg/m²     Physical Exam  Vitals reviewed.   Constitutional:       Appearance: She is well-developed.   HENT:      Head: Normocephalic and atraumatic.      Right Ear: Tympanic membrane and external ear normal.      Left Ear: Tympanic membrane and external ear normal.   Eyes:      Conjunctiva/sclera: Conjunctivae normal.      Pupils: Pupils are equal, round, and reactive to light.   Neck:      Thyroid: No thyromegaly.      Vascular: No JVD.   Cardiovascular:      Rate and Rhythm: Normal rate and regular rhythm.      Heart sounds: Normal heart sounds.   Pulmonary:      Effort: Pulmonary effort is normal.      Breath sounds: Normal breath sounds.   Abdominal:      General: Bowel sounds are normal.      Palpations: Abdomen is soft.   Musculoskeletal:         General: Normal range of motion.      Cervical back: Normal range of motion and neck supple.   Lymphadenopathy:      Cervical: No cervical adenopathy.   Skin:     General: Skin is warm and dry.      Findings: No rash.   Neurological:      Mental Status: She is alert and oriented to person, place, and time.      Cranial Nerves: No cranial nerve deficit.      Coordination: Coordination normal.   Psychiatric:         Behavior: Behavior normal.         Thought Content: Thought content normal.         Judgment: Judgment normal.        Result Review " :                 Assessment and Plan    Diagnoses and all orders for this visit:    1. Benign essential HTN (Primary)  Comments:  Carvedilol 25 twice daily hydrochlorothiazide 12.5 mg daily  Orders:  -     Comprehensive Metabolic Panel  -     CBC & Differential  -     Hemoglobin A1c  -     Lipid Panel With / Chol / HDL Ratio  -     Folate  -     Vitamin B12  -     Vitamin D 25 Hydroxy  -     Urinalysis With Microscopic If Indicated (No Culture) - Urine, Clean Catch  -     TSH  -     T4, Free    2. Seasonal allergic rhinitis due to pollen  Comments:  Allegra/fexofenadine 180 mg daily as needed  Orders:  -     fexofenadine (Wal-Fex Allergy) 180 MG tablet; Take 1 tablet by mouth Daily.  Dispense: 90 tablet; Refill: 3    3. Elevated cholesterol  Comments:  Simvastatin 40 mg daily  Orders:  -     Comprehensive Metabolic Panel  -     CBC & Differential  -     Hemoglobin A1c  -     Lipid Panel With / Chol / HDL Ratio  -     Folate  -     Vitamin B12  -     Vitamin D 25 Hydroxy  -     Urinalysis With Microscopic If Indicated (No Culture) - Urine, Clean Catch  -     TSH  -     T4, Free    4. Colon cancer screening  Comments:  Referral for screening colonoscopy  Orders:  -     Ambulatory Referral For Screening Colonoscopy    5. Mild intermittent asthma, unspecified whether complicated  Comments:  Breo inhaler 1 puff daily and confer with pulmonary about alternatives based on insurance preference    6. Hypovitaminosis D  -     Vitamin D 25 Hydroxy    7. Elevated glucose level  Comments:  Check A1c  Orders:  -     Hemoglobin A1c    8. Medicare annual wellness visit, subsequent        Follow Up   Return in about 6 months (around 6/6/2022) for Recheck.  Patient was given instructions and counseling regarding her condition or for health maintenance advice. Please see specific information pulled into the AVS if appropriate.

## 2021-12-07 LAB
25(OH)D3+25(OH)D2 SERPL-MCNC: 41.6 NG/ML (ref 30–100)
ALBUMIN SERPL-MCNC: 4.2 G/DL (ref 3.7–4.7)
ALBUMIN/GLOB SERPL: 2.1 {RATIO} (ref 1.2–2.2)
ALP SERPL-CCNC: 71 IU/L (ref 44–121)
ALT SERPL-CCNC: 17 IU/L (ref 0–32)
AST SERPL-CCNC: 20 IU/L (ref 0–40)
BASOPHILS # BLD AUTO: 0 X10E3/UL (ref 0–0.2)
BASOPHILS NFR BLD AUTO: 0 %
BILIRUB SERPL-MCNC: 0.4 MG/DL (ref 0–1.2)
BUN SERPL-MCNC: 21 MG/DL (ref 8–27)
BUN/CREAT SERPL: 23 (ref 12–28)
CALCIUM SERPL-MCNC: 9.5 MG/DL (ref 8.7–10.3)
CHLORIDE SERPL-SCNC: 104 MMOL/L (ref 96–106)
CHOLEST SERPL-MCNC: 180 MG/DL (ref 100–199)
CHOLEST/HDLC SERPL: 3.7 RATIO (ref 0–4.4)
CO2 SERPL-SCNC: 27 MMOL/L (ref 20–29)
CREAT SERPL-MCNC: 0.92 MG/DL (ref 0.57–1)
EOSINOPHIL # BLD AUTO: 0.1 X10E3/UL (ref 0–0.4)
EOSINOPHIL NFR BLD AUTO: 3 %
ERYTHROCYTE [DISTWIDTH] IN BLOOD BY AUTOMATED COUNT: 13.9 % (ref 11.7–15.4)
FOLATE SERPL-MCNC: 15.9 NG/ML
GLOBULIN SER CALC-MCNC: 2 G/DL (ref 1.5–4.5)
GLUCOSE SERPL-MCNC: 80 MG/DL (ref 65–99)
HBA1C MFR BLD: 5.8 % (ref 4.8–5.6)
HCT VFR BLD AUTO: 39.3 % (ref 34–46.6)
HDLC SERPL-MCNC: 49 MG/DL
HGB BLD-MCNC: 12.8 G/DL (ref 11.1–15.9)
IMM GRANULOCYTES # BLD AUTO: 0 X10E3/UL (ref 0–0.1)
IMM GRANULOCYTES NFR BLD AUTO: 0 %
LDLC SERPL CALC-MCNC: 121 MG/DL (ref 0–99)
LYMPHOCYTES # BLD AUTO: 1.7 X10E3/UL (ref 0.7–3.1)
LYMPHOCYTES NFR BLD AUTO: 32 %
MCH RBC QN AUTO: 27.5 PG (ref 26.6–33)
MCHC RBC AUTO-ENTMCNC: 32.6 G/DL (ref 31.5–35.7)
MCV RBC AUTO: 85 FL (ref 79–97)
MONOCYTES # BLD AUTO: 0.3 X10E3/UL (ref 0.1–0.9)
MONOCYTES NFR BLD AUTO: 6 %
NEUTROPHILS # BLD AUTO: 3.2 X10E3/UL (ref 1.4–7)
NEUTROPHILS NFR BLD AUTO: 59 %
PLATELET # BLD AUTO: 226 X10E3/UL (ref 150–450)
POTASSIUM SERPL-SCNC: 4 MMOL/L (ref 3.5–5.2)
PROT SERPL-MCNC: 6.2 G/DL (ref 6–8.5)
RBC # BLD AUTO: 4.65 X10E6/UL (ref 3.77–5.28)
SODIUM SERPL-SCNC: 143 MMOL/L (ref 134–144)
T4 FREE SERPL-MCNC: 1.17 NG/DL (ref 0.82–1.77)
TRIGL SERPL-MCNC: 53 MG/DL (ref 0–149)
TSH SERPL DL<=0.005 MIU/L-ACNC: 1.51 UIU/ML (ref 0.45–4.5)
VIT B12 SERPL-MCNC: 586 PG/ML (ref 232–1245)
VLDLC SERPL CALC-MCNC: 10 MG/DL (ref 5–40)
WBC # BLD AUTO: 5.4 X10E3/UL (ref 3.4–10.8)

## 2022-01-06 ENCOUNTER — TELEPHONE (OUTPATIENT)
Dept: INTERNAL MEDICINE | Facility: CLINIC | Age: 82
End: 2022-01-06

## 2022-01-06 RX ORDER — SIMVASTATIN 40 MG
40 TABLET ORAL NIGHTLY
Qty: 90 TABLET | Refills: 1 | Status: SHIPPED | OUTPATIENT
Start: 2022-01-06 | End: 2022-08-23

## 2022-01-06 RX ORDER — HYDROCHLOROTHIAZIDE 12.5 MG/1
12.5 CAPSULE, GELATIN COATED ORAL EVERY MORNING
Qty: 90 CAPSULE | Refills: 1 | Status: SHIPPED | OUTPATIENT
Start: 2022-01-06 | End: 2022-10-11

## 2022-01-06 NOTE — TELEPHONE ENCOUNTER
Caller: Loulou Collier    Relationship: Self    Best call back number: [phone]     Requested Prescriptions:   Requested Prescriptions     Pending Prescriptions Disp Refills   • simvastatin (ZOCOR) 40 MG tablet 90 tablet 1     Sig: Take 1 tablet by mouth Every Night.   • hydroCHLOROthiazide (MICROZIDE) 12.5 MG capsule 90 capsule 1     Sig: Take 1 capsule by mouth Every Morning.        Pharmacy where request should be sent: [id number] [address]  AT [facility] [phone] [facility] [phone]      Additional details provided by patient:IS TOTALLY OUT    Does the patient have less than a 3 day supply:  [x] Yes  [] No    Matt Villavicencio Rep   01/06/22 08:13 EST

## 2022-01-11 ENCOUNTER — PREP FOR SURGERY (OUTPATIENT)
Dept: OTHER | Facility: HOSPITAL | Age: 82
End: 2022-01-11

## 2022-01-11 DIAGNOSIS — Z12.11 SCREEN FOR COLON CANCER: Primary | ICD-10-CM

## 2022-04-29 ENCOUNTER — TRANSCRIBE ORDERS (OUTPATIENT)
Dept: ADMINISTRATIVE | Facility: HOSPITAL | Age: 82
End: 2022-04-29

## 2022-04-29 DIAGNOSIS — Z92.89 HISTORY OF MAMMOGRAPHY, SCREENING: Primary | ICD-10-CM

## 2022-06-07 ENCOUNTER — OFFICE VISIT (OUTPATIENT)
Dept: INTERNAL MEDICINE | Facility: CLINIC | Age: 82
End: 2022-06-07

## 2022-06-07 VITALS
SYSTOLIC BLOOD PRESSURE: 134 MMHG | HEART RATE: 67 BPM | BODY MASS INDEX: 39.68 KG/M2 | WEIGHT: 210 LBS | OXYGEN SATURATION: 99 % | DIASTOLIC BLOOD PRESSURE: 92 MMHG | TEMPERATURE: 98 F

## 2022-06-07 DIAGNOSIS — J45.20 MILD INTERMITTENT ASTHMA, UNSPECIFIED WHETHER COMPLICATED: ICD-10-CM

## 2022-06-07 DIAGNOSIS — E78.00 ELEVATED CHOLESTEROL: ICD-10-CM

## 2022-06-07 DIAGNOSIS — E55.9 HYPOVITAMINOSIS D: ICD-10-CM

## 2022-06-07 DIAGNOSIS — I10 BENIGN ESSENTIAL HTN: Primary | ICD-10-CM

## 2022-06-07 DIAGNOSIS — R73.09 ELEVATED GLUCOSE LEVEL: ICD-10-CM

## 2022-06-07 PROCEDURE — 99214 OFFICE O/P EST MOD 30 MIN: CPT | Performed by: FAMILY MEDICINE

## 2022-06-07 RX ORDER — BUDESONIDE AND FORMOTEROL FUMARATE DIHYDRATE 160; 4.5 UG/1; UG/1
AEROSOL RESPIRATORY (INHALATION)
COMMUNITY
Start: 2022-05-16

## 2022-06-07 RX ORDER — CHOLECALCIFEROL (VITAMIN D3) 10 MCG
CAPSULE ORAL
COMMUNITY

## 2022-06-07 NOTE — ASSESSMENT & PLAN NOTE
Lipid abnormalities are improving with treatment.  Pharmacotherapy as ordered.  Lipids will be reassessed in 6 months.  Simvastatin 40 mg daily

## 2022-06-07 NOTE — PROGRESS NOTES
Chief Complaint  Hyperlipidemia and Hypertension    Subjective        Loulou Collier presents to [facility]  Loulou is doing well and treatment of hypertension hyperlipidemia and mild asthma.  She is staying very active.  She has a rescue inhaler and otherwise uses Symbicort.  She has fexofenadine for recurrent seasonal allergies.      Objective   Vital Signs:  /92 (BP Location: Left arm, Patient Position: Sitting, Cuff Size: Large Adult)   Pulse 67   Temp 98 °F (36.7 °C) (Tympanic)   Wt 95.3 kg (210 lb)   SpO2 99%   BMI 39.68 kg/m²            Physical Exam  Vitals reviewed.   Constitutional:       Appearance: She is well-developed.   HENT:      Head: Normocephalic and atraumatic.      Right Ear: Tympanic membrane and external ear normal.      Left Ear: Tympanic membrane and external ear normal.      Nose: Nose normal.   Eyes:      Conjunctiva/sclera: Conjunctivae normal.      Pupils: Pupils are equal, round, and reactive to light.   Neck:      Thyroid: No thyromegaly.      Vascular: No JVD.   Cardiovascular:      Rate and Rhythm: Normal rate and regular rhythm.      Heart sounds: Normal heart sounds.   Pulmonary:      Effort: Pulmonary effort is normal.      Breath sounds: Normal breath sounds.   Abdominal:      General: Bowel sounds are normal.      Palpations: Abdomen is soft.   Musculoskeletal:         General: Normal range of motion.      Cervical back: Normal range of motion and neck supple.   Lymphadenopathy:      Cervical: No cervical adenopathy.   Skin:     General: Skin is warm and dry.      Findings: No rash.   Neurological:      Mental Status: She is alert and oriented to person, place, and time.      Cranial Nerves: No cranial nerve deficit.      Coordination: Coordination normal.   Psychiatric:         Behavior: Behavior normal.         Thought Content: Thought content normal.         Judgment: Judgment normal.        Result Review :                Assessment  and Plan   Diagnoses and all orders for this visit:    1. Benign essential HTN (Primary)  Assessment & Plan:  Hypertension is improving with treatment.  Continue current treatment regimen.  Blood pressure will be reassessed at the next regular appointment.  Carvedilol 25 twice daily hydrochlorothiazide 12.5 mg daily    Orders:  -     Urinalysis With Microscopic If Indicated (No Culture) - Urine, Clean Catch  -     TSH  -     T4, Free  -     Lipid Panel With / Chol / HDL Ratio  -     CBC & Differential  -     Comprehensive Metabolic Panel  -     Hemoglobin A1c    2. Elevated cholesterol  Assessment & Plan:  Lipid abnormalities are improving with treatment.  Pharmacotherapy as ordered.  Lipids will be reassessed in 6 months.  Simvastatin 40 mg daily    Orders:  -     Urinalysis With Microscopic If Indicated (No Culture) - Urine, Clean Catch  -     TSH  -     T4, Free  -     Lipid Panel With / Chol / HDL Ratio  -     CBC & Differential  -     Comprehensive Metabolic Panel  -     Hemoglobin A1c    3. Mild intermittent asthma, unspecified whether complicated  Assessment & Plan:  Asthma is improving with treatment.  The patient is experiencing no daytime asthma symptoms. She is experiencing no nighttime asthma symptoms.  Discussed medication dosage, use, side effects, and goals of treatment in detail.          Orders:  -     Urinalysis With Microscopic If Indicated (No Culture) - Urine, Clean Catch  -     TSH  -     T4, Free  -     Lipid Panel With / Chol / HDL Ratio  -     CBC & Differential  -     Comprehensive Metabolic Panel  -     Hemoglobin A1c    4. Elevated glucose level  -     Hemoglobin A1c    5. Hypovitaminosis D  -     Vitamin D 25 Hydroxy           Follow Up   Return in about 6 months (around 12/7/2022) for Medicare Wellness.  Patient was given instructions and counseling regarding her condition or for health maintenance advice. Please see specific information pulled into the AVS if appropriate.       Answers  for HPI/ROS submitted by the patient on 6/7/2022  What is the primary reason for your visit?: Physical

## 2022-06-07 NOTE — ASSESSMENT & PLAN NOTE
Asthma is improving with treatment.  The patient is experiencing no daytime asthma symptoms. She is experiencing no nighttime asthma symptoms.  Discussed medication dosage, use, side effects, and goals of treatment in detail.

## 2022-06-07 NOTE — ASSESSMENT & PLAN NOTE
Hypertension is improving with treatment.  Continue current treatment regimen.  Blood pressure will be reassessed at the next regular appointment.  Carvedilol 25 twice daily hydrochlorothiazide 12.5 mg daily

## 2022-06-08 LAB
25(OH)D3+25(OH)D2 SERPL-MCNC: 37.6 NG/ML (ref 30–100)
ALBUMIN SERPL-MCNC: 4.3 G/DL (ref 3.7–4.7)
ALBUMIN/GLOB SERPL: 2.2 {RATIO} (ref 1.2–2.2)
ALP SERPL-CCNC: 71 IU/L (ref 44–121)
ALT SERPL-CCNC: 13 IU/L (ref 0–32)
APPEARANCE UR: CLEAR
AST SERPL-CCNC: 19 IU/L (ref 0–40)
BASOPHILS # BLD AUTO: 0 X10E3/UL (ref 0–0.2)
BASOPHILS NFR BLD AUTO: 0 %
BILIRUB SERPL-MCNC: 0.4 MG/DL (ref 0–1.2)
BILIRUB UR QL STRIP: NEGATIVE
BUN SERPL-MCNC: 18 MG/DL (ref 8–27)
BUN/CREAT SERPL: 20 (ref 12–28)
CALCIUM SERPL-MCNC: 9.3 MG/DL (ref 8.7–10.3)
CHLORIDE SERPL-SCNC: 105 MMOL/L (ref 96–106)
CHOLEST SERPL-MCNC: 158 MG/DL (ref 100–199)
CHOLEST/HDLC SERPL: 3.1 RATIO (ref 0–4.4)
CO2 SERPL-SCNC: 26 MMOL/L (ref 20–29)
COLOR UR: YELLOW
CREAT SERPL-MCNC: 0.89 MG/DL (ref 0.57–1)
EGFRCR SERPLBLD CKD-EPI 2021: 65 ML/MIN/1.73
EOSINOPHIL # BLD AUTO: 0.2 X10E3/UL (ref 0–0.4)
EOSINOPHIL NFR BLD AUTO: 4 %
ERYTHROCYTE [DISTWIDTH] IN BLOOD BY AUTOMATED COUNT: 14 % (ref 11.7–15.4)
GLOBULIN SER CALC-MCNC: 2 G/DL (ref 1.5–4.5)
GLUCOSE SERPL-MCNC: 92 MG/DL (ref 65–99)
GLUCOSE UR QL STRIP: NEGATIVE
HBA1C MFR BLD: 5.9 % (ref 4.8–5.6)
HCT VFR BLD AUTO: 41.2 % (ref 34–46.6)
HDLC SERPL-MCNC: 51 MG/DL
HGB BLD-MCNC: 12.9 G/DL (ref 11.1–15.9)
HGB UR QL STRIP: NEGATIVE
IMM GRANULOCYTES # BLD AUTO: 0 X10E3/UL (ref 0–0.1)
IMM GRANULOCYTES NFR BLD AUTO: 0 %
KETONES UR QL STRIP: NEGATIVE
LDLC SERPL CALC-MCNC: 96 MG/DL (ref 0–99)
LEUKOCYTE ESTERASE UR QL STRIP: NEGATIVE
LYMPHOCYTES # BLD AUTO: 1.6 X10E3/UL (ref 0.7–3.1)
LYMPHOCYTES NFR BLD AUTO: 28 %
MCH RBC QN AUTO: 27.2 PG (ref 26.6–33)
MCHC RBC AUTO-ENTMCNC: 31.3 G/DL (ref 31.5–35.7)
MCV RBC AUTO: 87 FL (ref 79–97)
MICRO URNS: NORMAL
MONOCYTES # BLD AUTO: 0.3 X10E3/UL (ref 0.1–0.9)
MONOCYTES NFR BLD AUTO: 5 %
NEUTROPHILS # BLD AUTO: 3.5 X10E3/UL (ref 1.4–7)
NEUTROPHILS NFR BLD AUTO: 63 %
NITRITE UR QL STRIP: NEGATIVE
PH UR STRIP: 6 [PH] (ref 5–7.5)
PLATELET # BLD AUTO: 185 X10E3/UL (ref 150–450)
POTASSIUM SERPL-SCNC: 4.1 MMOL/L (ref 3.5–5.2)
PROT SERPL-MCNC: 6.3 G/DL (ref 6–8.5)
PROT UR QL STRIP: NEGATIVE
RBC # BLD AUTO: 4.74 X10E6/UL (ref 3.77–5.28)
SODIUM SERPL-SCNC: 143 MMOL/L (ref 134–144)
SP GR UR STRIP: 1.02 (ref 1–1.03)
T4 FREE SERPL-MCNC: 1.18 NG/DL (ref 0.82–1.77)
TRIGL SERPL-MCNC: 55 MG/DL (ref 0–149)
TSH SERPL DL<=0.005 MIU/L-ACNC: 2.22 UIU/ML (ref 0.45–4.5)
UROBILINOGEN UR STRIP-MCNC: 0.2 MG/DL (ref 0.2–1)
VLDLC SERPL CALC-MCNC: 11 MG/DL (ref 5–40)
WBC # BLD AUTO: 5.6 X10E3/UL (ref 3.4–10.8)

## 2022-07-10 DIAGNOSIS — I10 BENIGN ESSENTIAL HTN: ICD-10-CM

## 2022-07-11 RX ORDER — CARVEDILOL 25 MG/1
TABLET ORAL
Qty: 180 TABLET | Refills: 1 | Status: SHIPPED | OUTPATIENT
Start: 2022-07-11

## 2022-07-18 RX ORDER — MELOXICAM 15 MG/1
15 TABLET ORAL DAILY
Qty: 90 TABLET | Refills: 3 | Status: SHIPPED | OUTPATIENT
Start: 2022-07-18

## 2022-07-19 ENCOUNTER — APPOINTMENT (OUTPATIENT)
Dept: MAMMOGRAPHY | Facility: HOSPITAL | Age: 82
End: 2022-07-19

## 2022-07-21 ENCOUNTER — HOSPITAL ENCOUNTER (OUTPATIENT)
Dept: MAMMOGRAPHY | Facility: HOSPITAL | Age: 82
Discharge: HOME | End: 2022-07-21
Admitting: FAMILY MEDICINE

## 2022-07-21 DIAGNOSIS — Z92.89 HISTORY OF MAMMOGRAPHY, SCREENING: ICD-10-CM

## 2022-07-21 PROCEDURE — 77063 BREAST TOMOSYNTHESIS BI: CPT

## 2022-07-21 PROCEDURE — 77067 SCR MAMMO BI INCL CAD: CPT

## 2022-08-15 ENCOUNTER — ANESTHESIA (OUTPATIENT)
Dept: GASTROENTEROLOGY | Facility: HOSPITAL | Age: 82
End: 2022-08-15

## 2022-08-15 ENCOUNTER — ANESTHESIA EVENT (OUTPATIENT)
Dept: GASTROENTEROLOGY | Facility: HOSPITAL | Age: 82
End: 2022-08-15

## 2022-08-15 ENCOUNTER — HOSPITAL ENCOUNTER (OUTPATIENT)
Facility: HOSPITAL | Age: 82
Setting detail: HOSPITAL OUTPATIENT SURGERY
Discharge: HOME | End: 2022-08-15
Attending: SURGERY | Admitting: SURGERY

## 2022-08-15 VITALS
HEART RATE: 73 BPM | TEMPERATURE: 98 F | RESPIRATION RATE: 16 BRPM | BODY MASS INDEX: 38.46 KG/M2 | HEIGHT: 62 IN | SYSTOLIC BLOOD PRESSURE: 145 MMHG | DIASTOLIC BLOOD PRESSURE: 88 MMHG | WEIGHT: 209 LBS | OXYGEN SATURATION: 98 %

## 2022-08-15 PROCEDURE — 25010000002 GLUCAGON (RDNA) PER 1 MG: Performed by: SURGERY

## 2022-08-15 PROCEDURE — 25010000002 PROPOFOL 10 MG/ML EMULSION: Performed by: ANESTHESIOLOGY

## 2022-08-15 PROCEDURE — G0121 COLON CA SCRN NOT HI RSK IND: HCPCS | Performed by: SURGERY

## 2022-08-15 RX ORDER — SODIUM CHLORIDE 0.9 % (FLUSH) 0.9 %
10 SYRINGE (ML) INJECTION AS NEEDED
Status: DISCONTINUED | OUTPATIENT
Start: 2022-08-15 | End: 2022-08-15 | Stop reason: HOSPADM

## 2022-08-15 RX ORDER — PROPOFOL 10 MG/ML
VIAL (ML) INTRAVENOUS CONTINUOUS PRN
Status: DISCONTINUED | OUTPATIENT
Start: 2022-08-15 | End: 2022-08-15 | Stop reason: SURG

## 2022-08-15 RX ORDER — LIDOCAINE HYDROCHLORIDE 20 MG/ML
INJECTION, SOLUTION INFILTRATION; PERINEURAL AS NEEDED
Status: DISCONTINUED | OUTPATIENT
Start: 2022-08-15 | End: 2022-08-15 | Stop reason: SURG

## 2022-08-15 RX ORDER — SODIUM CHLORIDE 0.9 % (FLUSH) 0.9 %
10 SYRINGE (ML) INJECTION EVERY 12 HOURS SCHEDULED
Status: DISCONTINUED | OUTPATIENT
Start: 2022-08-15 | End: 2022-08-15 | Stop reason: HOSPADM

## 2022-08-15 RX ORDER — SODIUM CHLORIDE, SODIUM LACTATE, POTASSIUM CHLORIDE, CALCIUM CHLORIDE 600; 310; 30; 20 MG/100ML; MG/100ML; MG/100ML; MG/100ML
30 INJECTION, SOLUTION INTRAVENOUS CONTINUOUS PRN
Status: DISCONTINUED | OUTPATIENT
Start: 2022-08-15 | End: 2022-08-15 | Stop reason: HOSPADM

## 2022-08-15 RX ORDER — PROPOFOL 10 MG/ML
VIAL (ML) INTRAVENOUS AS NEEDED
Status: DISCONTINUED | OUTPATIENT
Start: 2022-08-15 | End: 2022-08-15 | Stop reason: SURG

## 2022-08-15 RX ADMIN — SODIUM CHLORIDE, POTASSIUM CHLORIDE, SODIUM LACTATE AND CALCIUM CHLORIDE 30 ML/HR: 600; 310; 30; 20 INJECTION, SOLUTION INTRAVENOUS at 08:03

## 2022-08-15 RX ADMIN — PROPOFOL 75 MG: 10 INJECTION, EMULSION INTRAVENOUS at 08:55

## 2022-08-15 RX ADMIN — Medication 150 MCG/KG/MIN: at 08:55

## 2022-08-15 RX ADMIN — LIDOCAINE HYDROCHLORIDE 60 MG: 20 INJECTION, SOLUTION INFILTRATION; PERINEURAL at 08:54

## 2022-08-15 NOTE — H&P
Cc: Endoscopy Visit    HPI: 82 y.o. female here for screening with no prior history of polyps and no family history of colon cancer.     Past Medical History:   Diagnosis Date   • Arthritis    • Asthma inhaler    when needed   • High cholesterol    • Hypertension        Past Surgical History:   Procedure Laterality Date   • BREAST EXCISIONAL BIOPSY Left 1998    benign   • COLONOSCOPY N/A 01/2020   • HYSTERECTOMY     • JOINT REPLACEMENT      RT KNEE   • KNEE SURGERY     • TOTAL KNEE ARTHROPLASTY Left 8/2/2017    Procedure: TOTAL KNEE ARTHROPLASTY WITH NITIN NAVIGATION;  Surgeon: Rui Craft MD;  Location: [facility] OR;  Service:        is allergic to penicillamine, cephalosporins, lisinopril, and penicillins.       Medication List      ASK your doctor about these medications    albuterol sulfate  (90 Base) MCG/ACT inhaler  Commonly known as: PROVENTIL HFA;VENTOLIN HFA;PROAIR HFA  INHALE 2 PUFFS BY MOUTH EVERY 4 HOURS AS NEEDED FOR WHEEZING     ALIGN PO     carvedilol 25 MG tablet  Commonly known as: COREG  TAKE 1 TABLET BY MOUTH TWICE DAILY WITH MEALS     EQL Vitamin D3 50 MCG (2000 UT) capsule  Generic drug: Cholecalciferol     fexofenadine 180 MG tablet  Commonly known as: Wal-Fex Allergy  Take 1 tablet by mouth Daily.     hydroCHLOROthiazide 12.5 MG capsule  Commonly known as: MICROZIDE  Take 1 capsule by mouth Every Morning.     meloxicam 15 MG tablet  Commonly known as: MOBIC  TAKE 1 TABLET BY MOUTH DAILY WITH FOOD     simvastatin 40 MG tablet  Commonly known as: ZOCOR  Take 1 tablet by mouth Every Night.     Symbicort 160-4.5 MCG/ACT inhaler  Generic drug: budesonide-formoterol     TURMERIC PO     VITAMIN B COMPLEX-C PO            Family History   Problem Relation Age of Onset   • Hypertension Other    • Cancer Other    • Diabetes Other    • No Known Problems Mother    • No Known Problems Father    • No Known Problems Sister    • No Known Problems Brother    • No Known Problems Daughter    • No  Known Problems Son    • No Known Problems Maternal Grandmother    • No Known Problems Paternal Grandmother    • Breast cancer Maternal Aunt    • No Known Problems Paternal Aunt    • BRCA 1/2 Neg Hx    • Colon cancer Neg Hx    • Endometrial cancer Neg Hx    • Ovarian cancer Neg Hx    • Malig Hyperthermia Neg Hx        Social History     Socioeconomic History   • Marital status:    Tobacco Use   • Smoking status: Never Smoker   • Smokeless tobacco: Never Used   Vaping Use   • Vaping Use: Never used   Substance and Sexual Activity   • Alcohol use: No   • Drug use: No   • Sexual activity: Not Currently     Partners: Male     Birth control/protection: None       Vitals:    08/15/22 0757   BP:    Pulse:    Resp:    Temp: 97.7 °F (36.5 °C)   SpO2:        Body mass index is 38.23 kg/m².    Physical Exam    General: No acute distress  Lungs: No labored breathing, Pulse oximetry on room air is 98%.  Heart/EKG: RRR  Abdomen: no complaints of pain  Mental:  Awake, alert, and oriented    Imp:     · Screening, average risk     Plan:  · C scope    Sally Nuno MD  08:54 EDT

## 2022-08-15 NOTE — OP NOTE
Colonoscopy Procedure Note  Loulou Collier  1940  Date of Procedure: 08/15/22    Pre-operative Diagnosis:    · Screening, average risk    Post-operative Diagnosis:  · Sigmoid diverticulosis, marked with spasticity.    Procedure: Colonoscopy with terminal ileoscopy        Recommendations:   · Cscope based on symptoms only.     · Review diverticulosis info given today.  · Keep a copy of the photographs of the procedure given to you today for possible need for reference in the future.      Surgeon: Yaakov    Anesthetic: MAC per Sami Fisher MD    Scope Withdrawal Time:  6 minutes  52 seconds    Procedure Details     MAC anesthesia was induced.  The 180 Colonoscopy was inserted blindly into the rectum and advanced to the cecum, with relative ease,  without need for pressure, lift, or turning.    Cecum was identified by the appendiceal orifice and the ileocecal valve and photographed for documentation.  Terminal ileum was intubated and was normal.    Prep quality was excellent.  A careful inspection was made as the scope was withdrawn, including a retroflexed view of the rectum; there was no suggestion of presence of angiodysplasias, colitis, or polyps but there were the diverticula, with no interventions.     Retroflexion in the rectum revealed no abnormalities.      Sally Nuno MD  08/15/22

## 2022-08-15 NOTE — ANESTHESIA POSTPROCEDURE EVALUATION
Patient: Loulou Collier    Procedure Summary     Date: 08/15/22 Room / Location:  [facility]    Anesthesia Start: 0852 Anesthesia Stop: 0913    Procedure: COLONOSCOPY TO CECUM AND TI (N/A ) Diagnosis:       Screen for colon cancer      (Screen for colon cancer [Z12.11])    Surgeons: Sally Nuno MD Provider: Sami Fisher MD    Anesthesia Type: MAC ASA Status: 2          Anesthesia Type: MAC    Vitals  Vitals Value Taken Time   /95 08/15/22 0921   Temp 36.7 °C (98 °F) 08/15/22 0921   Pulse 75 08/15/22 0921   Resp 16 08/15/22 0921   SpO2 99 % 08/15/22 0921           Post Anesthesia Care and Evaluation    Patient location during evaluation: PACU  Patient participation: complete - patient participated  Level of consciousness: awake  Pain score: 1  Pain management: adequate  Anesthetic complications: No anesthetic complications  PONV Status: none  Cardiovascular status: acceptable  Respiratory status: acceptable  Hydration status: acceptable

## 2022-08-15 NOTE — DISCHARGE INSTRUCTIONS
For the next 24 hours patient needs to be with a responsible adult.    For 24 hours DO NOT drive, operate machinery, appliances, drink alcohol, make important decisions or sign legal documents.    Start with a light or bland diet if you are feeling sick to your stomach otherwise advance to regular diet as tolerated.    Follow recommendations on procedure report if provided by your doctor.    Call Dr Nuno for problems [phone]    Problems may include but not limited to: large amounts of bleeding, trouble breathing, repeated vomiting, severe unrelieved pain, fever or chills.

## 2022-08-15 NOTE — ANESTHESIA PREPROCEDURE EVALUATION
Anesthesia Evaluation     Patient summary reviewed   NPO Solid Status: > 8 hours             Airway   No difficulty expected  Dental      Pulmonary    (+) asthma,  Cardiovascular     Rhythm: regular    (+) hypertension,       Neuro/Psych  GI/Hepatic/Renal/Endo      Musculoskeletal     Abdominal    Substance History      OB/GYN          Other                        Anesthesia Plan    ASA 2     MAC       Anesthetic plan, risks, benefits, and alternatives have been provided, discussed and informed consent has been obtained with: patient.        CODE STATUS:

## 2022-08-22 DIAGNOSIS — E78.00 ELEVATED CHOLESTEROL: Primary | ICD-10-CM

## 2022-08-23 RX ORDER — SIMVASTATIN 40 MG
40 TABLET ORAL NIGHTLY
Qty: 90 TABLET | Refills: 1 | Status: SHIPPED | OUTPATIENT
Start: 2022-08-23

## 2022-10-11 RX ORDER — HYDROCHLOROTHIAZIDE 12.5 MG/1
12.5 CAPSULE, GELATIN COATED ORAL EVERY MORNING
Qty: 90 CAPSULE | Refills: 1 | Status: SHIPPED | OUTPATIENT
Start: 2022-10-11

## 2022-11-18 ENCOUNTER — OFFICE VISIT (OUTPATIENT)
Dept: INTERNAL MEDICINE | Facility: CLINIC | Age: 82
End: 2022-11-18

## 2022-11-18 VITALS
WEIGHT: 213.2 LBS | DIASTOLIC BLOOD PRESSURE: 95 MMHG | TEMPERATURE: 97.3 F | HEART RATE: 72 BPM | SYSTOLIC BLOOD PRESSURE: 172 MMHG | HEIGHT: 62 IN | BODY MASS INDEX: 39.23 KG/M2 | OXYGEN SATURATION: 95 %

## 2022-11-18 DIAGNOSIS — J01.10 ACUTE NON-RECURRENT FRONTAL SINUSITIS: Primary | ICD-10-CM

## 2022-11-18 PROCEDURE — 99213 OFFICE O/P EST LOW 20 MIN: CPT

## 2022-11-18 RX ORDER — CLARITHROMYCIN 500 MG/1
500 TABLET, COATED ORAL 2 TIMES DAILY
Qty: 20 TABLET | Refills: 0 | Status: SHIPPED | OUTPATIENT
Start: 2022-11-18 | End: 2022-11-28

## 2023-05-05 ENCOUNTER — OFFICE VISIT (OUTPATIENT)
Dept: INTERNAL MEDICINE | Facility: CLINIC | Age: 83
End: 2023-05-05
Payer: MEDICARE

## 2023-05-05 VITALS
WEIGHT: 205 LBS | OXYGEN SATURATION: 98 % | SYSTOLIC BLOOD PRESSURE: 122 MMHG | DIASTOLIC BLOOD PRESSURE: 82 MMHG | BODY MASS INDEX: 37.49 KG/M2 | HEART RATE: 84 BPM

## 2023-05-05 DIAGNOSIS — I10 BENIGN ESSENTIAL HTN: ICD-10-CM

## 2023-05-05 DIAGNOSIS — Z00.00 MEDICARE ANNUAL WELLNESS VISIT, SUBSEQUENT: ICD-10-CM

## 2023-05-05 DIAGNOSIS — J45.20 MILD INTERMITTENT ASTHMA WITHOUT COMPLICATION: ICD-10-CM

## 2023-05-05 DIAGNOSIS — J30.1 NON-SEASONAL ALLERGIC RHINITIS DUE TO POLLEN: ICD-10-CM

## 2023-05-05 DIAGNOSIS — R73.09 ELEVATED GLUCOSE: ICD-10-CM

## 2023-05-05 DIAGNOSIS — R47.81 SLURRING OF SPEECH: Primary | ICD-10-CM

## 2023-05-05 DIAGNOSIS — D50.0 IRON DEFICIENCY ANEMIA DUE TO CHRONIC BLOOD LOSS: ICD-10-CM

## 2023-05-05 DIAGNOSIS — E78.00 ELEVATED CHOLESTEROL: ICD-10-CM

## 2023-05-05 NOTE — PROGRESS NOTES
"Chief Complaint  Annual Exam    Subjective        Loulou Collier presents to [facility]  History of Present Illness  Loulou is a delightful lady who is semiretired.  She is very active.  She had some slurred speech that she has noticed every now and then however this could be because she is in a hurry and talks fast sometimes.  Be that as it may we will check this out with an MRI of the brain to make sure everything there is okay.    Otherwise continue treatment of hypertension hyperlipidemia and also mild intermittent asthma for which she is seeing the pulmonary in the past.  She is doing well on Symbicort 2 puffs twice daily.      Objective   Vital Signs:  /82 (BP Location: Left arm, Patient Position: Sitting, Cuff Size: Large Adult)   Pulse 84   Wt 93 kg (205 lb)   SpO2 98%   BMI 37.49 kg/m²   Estimated body mass index is 37.49 kg/m² as calculated from the following:    Height as of 11/18/22: 157.5 cm (62\").    Weight as of this encounter: 93 kg (205 lb).             Physical Exam  Vitals reviewed.   Constitutional:       Appearance: She is well-developed.   HENT:      Head: Normocephalic and atraumatic.      Right Ear: Tympanic membrane and external ear normal.      Left Ear: Tympanic membrane and external ear normal.      Nose: Nose normal.   Eyes:      Conjunctiva/sclera: Conjunctivae normal.      Pupils: Pupils are equal, round, and reactive to light.   Neck:      Thyroid: No thyromegaly.      Vascular: No JVD.   Cardiovascular:      Rate and Rhythm: Normal rate and regular rhythm.      Heart sounds: Normal heart sounds.   Pulmonary:      Effort: Pulmonary effort is normal.      Breath sounds: Normal breath sounds.   Abdominal:      General: Bowel sounds are normal.      Palpations: Abdomen is soft.   Musculoskeletal:         General: Normal range of motion.      Cervical back: Normal range of motion and neck supple.   Lymphadenopathy:      Cervical: No cervical " adenopathy.   Skin:     General: Skin is warm and dry.      Findings: No rash.   Neurological:      Mental Status: She is alert and oriented to person, place, and time.      Cranial Nerves: No cranial nerve deficit.      Coordination: Coordination normal.   Psychiatric:         Behavior: Behavior normal.         Thought Content: Thought content normal.         Judgment: Judgment normal.        Result Review :                   Assessment and Plan   Diagnoses and all orders for this visit:    1. Slurring of speech (Primary)  Comments:  MRI brain  Orders:  -     MRI Brain Without Contrast; Future  -     Urinalysis With Microscopic If Indicated (No Culture) - Urine, Clean Catch  -     TSH  -     T4, Free  -     Lipid Panel With / Chol / HDL Ratio  -     CBC & Differential  -     Comprehensive Metabolic Panel  -     Hemoglobin A1c  -     Vitamin B12    2. Elevated cholesterol  Comments:  Check lipid panel on simvastatin 40 mg daily  Orders:  -     Urinalysis With Microscopic If Indicated (No Culture) - Urine, Clean Catch  -     TSH  -     T4, Free  -     Lipid Panel With / Chol / HDL Ratio  -     CBC & Differential  -     Comprehensive Metabolic Panel  -     Hemoglobin A1c  -     Vitamin B12    3. Benign essential HTN  Comments:  Carvedilol 25 mg twice daily hydrochlorothiazide 12.5 mg daily  Orders:  -     Urinalysis With Microscopic If Indicated (No Culture) - Urine, Clean Catch  -     TSH  -     T4, Free  -     Lipid Panel With / Chol / HDL Ratio  -     CBC & Differential  -     Comprehensive Metabolic Panel  -     Hemoglobin A1c  -     Vitamin B12    4. Medicare annual wellness visit, subsequent    5. Iron deficiency anemia due to chronic blood loss  -     Urinalysis With Microscopic If Indicated (No Culture) - Urine, Clean Catch  -     TSH  -     T4, Free  -     Lipid Panel With / Chol / HDL Ratio  -     CBC & Differential  -     Comprehensive Metabolic Panel  -     Hemoglobin A1c  -     Vitamin B12    6. Mild  intermittent asthma without complication  Comments:  Symbicort 160-4.52 puffs twice daily.  As needed albuterol is used rarely    7. Non-seasonal allergic rhinitis due to pollen    8. Elevated glucose  -     Urinalysis With Microscopic If Indicated (No Culture) - Urine, Clean Catch  -     TSH  -     T4, Free  -     Lipid Panel With / Chol / HDL Ratio  -     CBC & Differential  -     Comprehensive Metabolic Panel  -     Hemoglobin A1c  -     Vitamin B12             Follow Up   No follow-ups on file.  Patient was given instructions and counseling regarding her condition or for health maintenance advice. Please see specific information pulled into the AVS if appropriate.

## 2023-05-05 NOTE — PROGRESS NOTES
The ABCs of the Annual Wellness Visit  Subsequent Medicare Wellness Visit    Subjective      Loulou Collier is a 83 y.o. female who presents for a Subsequent Medicare Wellness Visit.    The following portions of the patient's history were reviewed and   updated as appropriate: allergies, current medications, past family history, past medical history, past social history, past surgical history and problem list.    Compared to one year ago, the patient feels her physical   health is the same.    Compared to one year ago, the patient feels her mental   health is the same.    Recent Hospitalizations:  She was not admitted to the hospital during the last year.       Current Medical Providers:  Patient Care Team:  Abebe Mancilla MD as PCP - General (Family Medicine)    Outpatient Medications Prior to Visit   Medication Sig Dispense Refill   • albuterol sulfate  (90 Base) MCG/ACT inhaler INHALE 2 PUFFS BY MOUTH EVERY 4 HOURS AS NEEDED FOR WHEEZING 18 g 2   • carvedilol (COREG) 25 MG tablet TAKE 1 TABLET BY MOUTH TWICE DAILY WITH MEALS 180 tablet 1   • Cholecalciferol (EQL Vitamin D3) 50 MCG (2000 UT) capsule Take  by mouth.     • fexofenadine (Wal-Fex Allergy) 180 MG tablet Take 1 tablet by mouth Daily. 90 tablet 3   • hydroCHLOROthiazide (MICROZIDE) 12.5 MG capsule TAKE 1 CAPSULE BY MOUTH EVERY MORNING 90 capsule 1   • meloxicam (MOBIC) 15 MG tablet TAKE 1 TABLET BY MOUTH DAILY WITH FOOD 90 tablet 3   • Probiotic Product (ALIGN PO) Take  by mouth.     • simvastatin (ZOCOR) 40 MG tablet TAKE 1 TABLET BY MOUTH EVERY NIGHT 90 tablet 1   • Symbicort 160-4.5 MCG/ACT inhaler      • TURMERIC PO Take 2,000 mg by mouth.     • VITAMIN B COMPLEX-C PO Take  by mouth.       No facility-administered medications prior to visit.       No opioid medication identified on active medication list. I have reviewed chart for other potential  high risk medication/s and harmful drug interactions in the elderly.          Aspirin is not on  "active medication list.  Aspirin use is not indicated based on review of current medical condition/s. Risk of harm outweighs potential benefits.  .    Patient Active Problem List   Diagnosis   • Elevated cholesterol   • Benign essential HTN   • H/O hysterectomy for benign disease   • Medicare annual wellness visit, subsequent   • Primary osteoarthritis of knee   • History of total knee arthroplasty, left   • Cervical spine crepitus   • Iron deficiency anemia due to chronic blood loss   • Need for vaccination   • Aortic root dilation   • Pulmonary nodule   • Hydradenitis   • Anemia   • DJD (degenerative joint disease) of knee   • Asthma     Advance Care Planning   Advance Care Planning     Advance Directive is not on file.  ACP discussion was held with the patient during this visit. Patient has an advance directive (not in EMR), copy requested.     Objective    Vitals:    05/05/23 1544   BP: 122/82   BP Location: Left arm   Patient Position: Sitting   Cuff Size: Large Adult   Pulse: 84   SpO2: 98%   Weight: 93 kg (205 lb)   PainSc: 0-No pain     Estimated body mass index is 37.49 kg/m² as calculated from the following:    Height as of 11/18/22: 157.5 cm (62\").    Weight as of this encounter: 93 kg (205 lb).    Class 2 Severe Obesity (BMI >=35 and <=39.9). Obesity-related health conditions include the following: hypertension. Obesity is improving with lifestyle modifications. BMI is is above average; BMI management plan is completed. We discussed portion control and increasing exercise.      Does the patient have evidence of cognitive impairment?   No            HEALTH RISK ASSESSMENT    Smoking Status:  Social History     Tobacco Use   Smoking Status Never   Smokeless Tobacco Never     Alcohol Consumption:  Social History     Substance and Sexual Activity   Alcohol Use No     Fall Risk Screen:    STEADI Fall Risk Assessment was completed, and patient is at MODERATE risk for falls. Assessment completed " on:2023    Depression Screenin/5/2023     3:46 PM   PHQ-2/PHQ-9 Depression Screening   Little Interest or Pleasure in Doing Things 0-->not at all   Feeling Down, Depressed or Hopeless 0-->not at all   Trouble Falling or Staying Asleep, or Sleeping Too Much 0-->not at all   Feeling Tired or Having Little Energy 0-->not at all   Poor Appetite or Overeating 0-->not at all   Feeling Bad about Yourself - or that You are a Failure or Have Let Yourself or Your Family Down 0-->not at all   Trouble Concentrating on Things, Such as Reading the Newspaper or Watching Television 0-->not at all   Moving or Speaking So Slowly that Other People Could Have Noticed? Or the Opposite - Being So Fidgety 0-->not at all   Thoughts that You Would be Better Off Dead or of Hurting Yourself in Some Way 0-->not at all   PHQ-9: Brief Depression Severity Measure Score 0   If You Checked Off Any Problems, How Difficult Have These Problems Made It For You to Do Your Work, Take Care of Things at Home, or Get Along with Other People? not difficult at all       Health Habits and Functional and Cognitive Screenin/6/2021     2:00 PM   Functional & Cognitive Status   Do you have difficulty preparing food and eating? No   Do you have difficulty bathing yourself, getting dressed or grooming yourself? No   Do you have difficulty using the toilet? No   Do you have difficulty moving around from place to place? No   Do you have trouble with steps or getting out of a bed or a chair? No   Current Diet Well Balanced Diet   Dental Exam Up to date   Eye Exam Up to date   Exercise (times per week) 3 times per week   Current Exercises Include Walking        Exercise Comment  once a week   Do you need help using the phone?  No   Are you deaf or do you have serious difficulty hearing?  No   Do you need help with transportation? No   Do you need help shopping? No   Do you need help preparing meals?  No   Do you need help with housework?   No   Do you need help with laundry? No   Do you need help taking your medications? No   Do you need help managing money? No   Do you ever drive or ride in a car without wearing a seat belt? No   Have you felt unusual stress, anger or loneliness in the last month? No   Who do you live with? Child   If you need help, do you have trouble finding someone available to you? No   Have you been bothered in the last four weeks by sexual problems? No   Do you have difficulty concentrating, remembering or making decisions? No       Age-appropriate Screening Schedule:  Refer to the list below for future screening recommendations based on patient's age, sex and/or medical conditions. Orders for these recommended tests are listed in the plan section. The patient has been provided with a written plan.    Health Maintenance   Topic Date Due   • TDAP/TD VACCINES (1 - Tdap) Never done   • ANNUAL WELLNESS VISIT  12/06/2022   • DXA SCAN  06/01/2023   • LIPID PANEL  06/07/2023   • INFLUENZA VACCINE  08/01/2023   • MAMMOGRAM  07/21/2024   • COLORECTAL CANCER SCREENING  08/15/2032   • COVID-19 Vaccine  Completed   • Pneumococcal Vaccine 65+  Completed   • ZOSTER VACCINE  Completed                  CMS Preventative Services Quick Reference  Risk Factors Identified During Encounter:    Fall Risk-High or Moderate: Discussed Fall Prevention in the home    The above risks/problems have been discussed with the patient.  Pertinent information has been shared with the patient in the After Visit Summary.    Diagnoses and all orders for this visit:    1. Slurring of speech (Primary)  -     MRI Brain Without Contrast; Future        Follow Up:   Next Medicare Wellness visit to be scheduled in 1 year.      An After Visit Summary and PPPS were made available to the patient.

## 2023-05-06 LAB
ALBUMIN SERPL-MCNC: 4.6 G/DL (ref 3.5–5.2)
ALBUMIN/GLOB SERPL: 2 G/DL
ALP SERPL-CCNC: 78 U/L (ref 39–117)
ALT SERPL-CCNC: 17 U/L (ref 1–33)
APPEARANCE UR: CLEAR
AST SERPL-CCNC: 20 U/L (ref 1–32)
BASOPHILS # BLD AUTO: 0.03 10*3/MM3 (ref 0–0.2)
BASOPHILS NFR BLD AUTO: 0.5 % (ref 0–1.5)
BILIRUB SERPL-MCNC: 0.4 MG/DL (ref 0–1.2)
BILIRUB UR QL STRIP: NEGATIVE
BUN SERPL-MCNC: 19 MG/DL (ref 8–23)
BUN/CREAT SERPL: 22.4 (ref 7–25)
CALCIUM SERPL-MCNC: 10.4 MG/DL (ref 8.6–10.5)
CHLORIDE SERPL-SCNC: 101 MMOL/L (ref 98–107)
CHOLEST SERPL-MCNC: 182 MG/DL (ref 0–200)
CHOLEST/HDLC SERPL: 3.14 {RATIO}
CO2 SERPL-SCNC: 32.6 MMOL/L (ref 22–29)
COLOR UR: YELLOW
CREAT SERPL-MCNC: 0.85 MG/DL (ref 0.57–1)
EGFRCR SERPLBLD CKD-EPI 2021: 68.9 ML/MIN/1.73
EOSINOPHIL # BLD AUTO: 0.1 10*3/MM3 (ref 0–0.4)
EOSINOPHIL NFR BLD AUTO: 1.5 % (ref 0.3–6.2)
ERYTHROCYTE [DISTWIDTH] IN BLOOD BY AUTOMATED COUNT: 13.3 % (ref 12.3–15.4)
GLOBULIN SER CALC-MCNC: 2.3 GM/DL
GLUCOSE SERPL-MCNC: 96 MG/DL (ref 65–99)
GLUCOSE UR QL STRIP: NEGATIVE
HBA1C MFR BLD: 5.7 % (ref 4.8–5.6)
HCT VFR BLD AUTO: 42.3 % (ref 34–46.6)
HDLC SERPL-MCNC: 58 MG/DL (ref 40–60)
HGB BLD-MCNC: 13.5 G/DL (ref 12–15.9)
HGB UR QL STRIP: NEGATIVE
IMM GRANULOCYTES # BLD AUTO: 0.01 10*3/MM3 (ref 0–0.05)
IMM GRANULOCYTES NFR BLD AUTO: 0.2 % (ref 0–0.5)
KETONES UR QL STRIP: ABNORMAL
LDLC SERPL CALC-MCNC: 113 MG/DL (ref 0–100)
LEUKOCYTE ESTERASE UR QL STRIP: NEGATIVE
LYMPHOCYTES # BLD AUTO: 1.71 10*3/MM3 (ref 0.7–3.1)
LYMPHOCYTES NFR BLD AUTO: 26.4 % (ref 19.6–45.3)
MCH RBC QN AUTO: 27.1 PG (ref 26.6–33)
MCHC RBC AUTO-ENTMCNC: 31.9 G/DL (ref 31.5–35.7)
MCV RBC AUTO: 84.8 FL (ref 79–97)
MONOCYTES # BLD AUTO: 0.37 10*3/MM3 (ref 0.1–0.9)
MONOCYTES NFR BLD AUTO: 5.7 % (ref 5–12)
NEUTROPHILS # BLD AUTO: 4.25 10*3/MM3 (ref 1.7–7)
NEUTROPHILS NFR BLD AUTO: 65.7 % (ref 42.7–76)
NITRITE UR QL STRIP: NEGATIVE
NRBC BLD AUTO-RTO: 0 /100 WBC (ref 0–0.2)
PH UR STRIP: 6.5 [PH] (ref 5–8)
PLATELET # BLD AUTO: 192 10*3/MM3 (ref 140–450)
POTASSIUM SERPL-SCNC: 3.9 MMOL/L (ref 3.5–5.2)
PROT SERPL-MCNC: 6.9 G/DL (ref 6–8.5)
PROT UR QL STRIP: NEGATIVE
RBC # BLD AUTO: 4.99 10*6/MM3 (ref 3.77–5.28)
SODIUM SERPL-SCNC: 143 MMOL/L (ref 136–145)
SP GR UR STRIP: 1.02 (ref 1–1.03)
T4 FREE SERPL-MCNC: 1.33 NG/DL (ref 0.93–1.7)
TRIGL SERPL-MCNC: 56 MG/DL (ref 0–150)
TSH SERPL DL<=0.005 MIU/L-ACNC: 1.92 UIU/ML (ref 0.27–4.2)
UROBILINOGEN UR STRIP-MCNC: ABNORMAL MG/DL
VIT B12 SERPL-MCNC: 731 PG/ML (ref 211–946)
VLDLC SERPL CALC-MCNC: 11 MG/DL (ref 5–40)
WBC # BLD AUTO: 6.47 10*3/MM3 (ref 3.4–10.8)

## 2023-05-15 RX ORDER — HYDROCHLOROTHIAZIDE 12.5 MG/1
12.5 CAPSULE, GELATIN COATED ORAL EVERY MORNING
Qty: 90 CAPSULE | Refills: 1 | Status: SHIPPED | OUTPATIENT
Start: 2023-05-15

## 2023-05-29 RX ORDER — ROSUVASTATIN CALCIUM 40 MG/1
40 TABLET, COATED ORAL DAILY
Qty: 90 TABLET | Refills: 3 | Status: SHIPPED | OUTPATIENT
Start: 2023-05-29

## 2023-08-21 ENCOUNTER — HOSPITAL ENCOUNTER (OUTPATIENT)
Dept: MAMMOGRAPHY | Facility: HOSPITAL | Age: 83
Discharge: HOME | End: 2023-08-21
Admitting: FAMILY MEDICINE
Payer: MEDICARE

## 2023-08-21 DIAGNOSIS — Z12.31 SCREENING MAMMOGRAM FOR BREAST CANCER: ICD-10-CM

## 2023-08-21 PROCEDURE — 77067 SCR MAMMO BI INCL CAD: CPT

## 2023-08-21 PROCEDURE — 77063 BREAST TOMOSYNTHESIS BI: CPT

## 2023-10-11 RX ORDER — MELOXICAM 15 MG/1
15 TABLET ORAL DAILY
Qty: 90 TABLET | Refills: 3 | Status: SHIPPED | OUTPATIENT
Start: 2023-10-11

## 2023-11-01 RX ORDER — BUDESONIDE AND FORMOTEROL FUMARATE DIHYDRATE 160; 4.5 UG/1; UG/1
2 AEROSOL RESPIRATORY (INHALATION) 2 TIMES DAILY
Qty: 30.6 G | Refills: 3 | Status: SHIPPED | OUTPATIENT
Start: 2023-11-01

## 2023-11-13 ENCOUNTER — OFFICE VISIT (OUTPATIENT)
Dept: INTERNAL MEDICINE | Facility: CLINIC | Age: 83
End: 2023-11-13
Payer: MEDICARE

## 2023-11-13 VITALS
WEIGHT: 207 LBS | HEART RATE: 66 BPM | DIASTOLIC BLOOD PRESSURE: 88 MMHG | BODY MASS INDEX: 37.86 KG/M2 | SYSTOLIC BLOOD PRESSURE: 136 MMHG | OXYGEN SATURATION: 99 %

## 2023-11-13 DIAGNOSIS — J45.20 MILD INTERMITTENT ASTHMA WITHOUT COMPLICATION: ICD-10-CM

## 2023-11-13 DIAGNOSIS — E78.00 ELEVATED CHOLESTEROL: ICD-10-CM

## 2023-11-13 DIAGNOSIS — J20.9 ACUTE BRONCHITIS, UNSPECIFIED ORGANISM: ICD-10-CM

## 2023-11-13 DIAGNOSIS — J40 BRONCHITIS: ICD-10-CM

## 2023-11-13 DIAGNOSIS — R73.09 ELEVATED GLUCOSE: ICD-10-CM

## 2023-11-13 DIAGNOSIS — J01.00 SUBACUTE MAXILLARY SINUSITIS: ICD-10-CM

## 2023-11-13 DIAGNOSIS — E55.9 HYPOVITAMINOSIS D: ICD-10-CM

## 2023-11-13 DIAGNOSIS — M81.6 LOCALIZED OSTEOPOROSIS (LEQUESNE): ICD-10-CM

## 2023-11-13 DIAGNOSIS — I10 BENIGN ESSENTIAL HTN: ICD-10-CM

## 2023-11-13 DIAGNOSIS — J30.1 NON-SEASONAL ALLERGIC RHINITIS DUE TO POLLEN: ICD-10-CM

## 2023-11-13 DIAGNOSIS — Z13.820 OSTEOPOROSIS SCREENING: Primary | ICD-10-CM

## 2023-11-13 PROBLEM — Z96.1 BILATERAL PSEUDOPHAKIA: Status: ACTIVE | Noted: 2019-06-03

## 2023-11-13 LAB
25(OH)D3+25(OH)D2 SERPL-MCNC: 40.4 NG/ML (ref 30–100)
ALBUMIN SERPL-MCNC: 4.4 G/DL (ref 3.5–5.2)
ALBUMIN/GLOB SERPL: 2.1 G/DL
ALP SERPL-CCNC: 70 U/L (ref 39–117)
ALT SERPL-CCNC: 17 U/L (ref 1–33)
APPEARANCE UR: CLEAR
AST SERPL-CCNC: 20 U/L (ref 1–32)
BASOPHILS # BLD AUTO: 0.03 10*3/MM3 (ref 0–0.2)
BASOPHILS NFR BLD AUTO: 0.6 % (ref 0–1.5)
BILIRUB SERPL-MCNC: 0.4 MG/DL (ref 0–1.2)
BILIRUB UR QL STRIP: NEGATIVE
BUN SERPL-MCNC: 15 MG/DL (ref 8–23)
BUN/CREAT SERPL: 18.1 (ref 7–25)
CALCIUM SERPL-MCNC: 9.9 MG/DL (ref 8.6–10.5)
CHLORIDE SERPL-SCNC: 106 MMOL/L (ref 98–107)
CHOLEST SERPL-MCNC: 158 MG/DL (ref 0–200)
CHOLEST/HDLC SERPL: 2.72 {RATIO}
CO2 SERPL-SCNC: 34.1 MMOL/L (ref 22–29)
COLOR UR: YELLOW
CREAT SERPL-MCNC: 0.83 MG/DL (ref 0.57–1)
EGFRCR SERPLBLD CKD-EPI 2021: 70.9 ML/MIN/1.73
EOSINOPHIL # BLD AUTO: 0.15 10*3/MM3 (ref 0–0.4)
EOSINOPHIL NFR BLD AUTO: 2.9 % (ref 0.3–6.2)
ERYTHROCYTE [DISTWIDTH] IN BLOOD BY AUTOMATED COUNT: 14 % (ref 12.3–15.4)
GLOBULIN SER CALC-MCNC: 2.1 GM/DL
GLUCOSE SERPL-MCNC: 99 MG/DL (ref 65–99)
GLUCOSE UR QL STRIP: NEGATIVE
HBA1C MFR BLD: 5.8 % (ref 4.8–5.6)
HCT VFR BLD AUTO: 40.8 % (ref 34–46.6)
HDLC SERPL-MCNC: 58 MG/DL (ref 40–60)
HGB BLD-MCNC: 12.6 G/DL (ref 12–15.9)
HGB UR QL STRIP: NEGATIVE
IMM GRANULOCYTES # BLD AUTO: 0.01 10*3/MM3 (ref 0–0.05)
IMM GRANULOCYTES NFR BLD AUTO: 0.2 % (ref 0–0.5)
KETONES UR QL STRIP: NEGATIVE
LDLC SERPL CALC-MCNC: 91 MG/DL (ref 0–100)
LEUKOCYTE ESTERASE UR QL STRIP: NEGATIVE
LYMPHOCYTES # BLD AUTO: 1.34 10*3/MM3 (ref 0.7–3.1)
LYMPHOCYTES NFR BLD AUTO: 25.5 % (ref 19.6–45.3)
MCH RBC QN AUTO: 27.1 PG (ref 26.6–33)
MCHC RBC AUTO-ENTMCNC: 30.9 G/DL (ref 31.5–35.7)
MCV RBC AUTO: 87.7 FL (ref 79–97)
MONOCYTES # BLD AUTO: 0.32 10*3/MM3 (ref 0.1–0.9)
MONOCYTES NFR BLD AUTO: 6.1 % (ref 5–12)
NEUTROPHILS # BLD AUTO: 3.41 10*3/MM3 (ref 1.7–7)
NEUTROPHILS NFR BLD AUTO: 64.7 % (ref 42.7–76)
NITRITE UR QL STRIP: NEGATIVE
NRBC BLD AUTO-RTO: 0 /100 WBC (ref 0–0.2)
PH UR STRIP: 6.5 [PH] (ref 5–8)
PLATELET # BLD AUTO: 201 10*3/MM3 (ref 140–450)
POTASSIUM SERPL-SCNC: 3.8 MMOL/L (ref 3.5–5.2)
PROT SERPL-MCNC: 6.5 G/DL (ref 6–8.5)
PROT UR QL STRIP: NEGATIVE
RBC # BLD AUTO: 4.65 10*6/MM3 (ref 3.77–5.28)
SODIUM SERPL-SCNC: 145 MMOL/L (ref 136–145)
SP GR UR STRIP: 1.02 (ref 1–1.03)
T4 FREE SERPL-MCNC: 1.16 NG/DL (ref 0.93–1.7)
TRIGL SERPL-MCNC: 43 MG/DL (ref 0–150)
TSH SERPL DL<=0.005 MIU/L-ACNC: 1.57 UIU/ML (ref 0.27–4.2)
UROBILINOGEN UR STRIP-MCNC: NORMAL MG/DL
VLDLC SERPL CALC-MCNC: 9 MG/DL (ref 5–40)
WBC # BLD AUTO: 5.26 10*3/MM3 (ref 3.4–10.8)

## 2023-11-13 RX ORDER — ALBUTEROL SULFATE 90 UG/1
2 AEROSOL, METERED RESPIRATORY (INHALATION) EVERY 4 HOURS PRN
Qty: 18 G | Refills: 2 | Status: SHIPPED | OUTPATIENT
Start: 2023-11-13

## 2023-11-13 RX ORDER — CLARITHROMYCIN 500 MG/1
500 TABLET, COATED ORAL 2 TIMES DAILY
Qty: 20 TABLET | Refills: 0 | Status: SHIPPED | OUTPATIENT
Start: 2023-11-13

## 2023-11-13 NOTE — PROGRESS NOTES
"Answers submitted by the patient for this visit:  Primary Reason for Visit (Submitted on 11/13/2023)  What is the primary reason for your visit?: Physical  Chief Complaint  Hyperlipidemia, Hypertension, and Anemia    Subjective        Loulou Collier presents to [facility]  History of Present Illness  Loulou is a delightful lady who looks younger than her stated age.  She is up-to-date on colonoscopy and immunizations.  She has had 2-week plus course of sinus congestion postnasal drainage no fever but persistent cough.  She has now had a little wheezing in the left lower lung.  She has mild intermittent asthma and uses occasional albuterol and Symbicort twice daily.    Given past history we will give her Biaxin 500 mg twice daily for 10 days holding rosuvastatin during the process.    We will continue current treatment of hyperlipidemia with rosuvastatin 40 mg daily and current treatment of blood pressure with carvedilol plus hydrochlorothiazide.  She is doing a great job taking care of her health and management of current medical situation.    She has had a really good looking DEXA scan 2 years ago.  Hyperlipidemia  This is a chronic problem. Recent lipid tests were reviewed and are normal.   Hypertension    Anemia        Objective   Vital Signs:  /88 (BP Location: Left arm, Patient Position: Sitting, Cuff Size: Large Adult)   Pulse 66   Wt 93.9 kg (207 lb)   SpO2 99%   BMI 37.86 kg/m²   Estimated body mass index is 37.86 kg/m² as calculated from the following:    Height as of 11/18/22: 157.5 cm (62\").    Weight as of this encounter: 93.9 kg (207 lb).               Physical Exam  Vitals reviewed.   Constitutional:       Appearance: She is well-developed. She is obese.   HENT:      Head: Normocephalic and atraumatic.      Right Ear: Tympanic membrane and external ear normal.      Left Ear: Tympanic membrane and external ear normal.      Nose: Nose normal.   Eyes:      " Conjunctiva/sclera: Conjunctivae normal.      Pupils: Pupils are equal, round, and reactive to light.   Neck:      Thyroid: No thyromegaly.      Vascular: No JVD.   Cardiovascular:      Rate and Rhythm: Normal rate and regular rhythm.      Heart sounds: Normal heart sounds.   Pulmonary:      Effort: Pulmonary effort is normal.      Breath sounds: Normal breath sounds. Examination of the right-lower field reveals wheezing.   Abdominal:      General: Bowel sounds are normal.      Palpations: Abdomen is soft.   Musculoskeletal:         General: Normal range of motion.      Cervical back: Normal range of motion and neck supple.   Lymphadenopathy:      Cervical: No cervical adenopathy.   Skin:     General: Skin is warm and dry.      Findings: No rash.   Neurological:      Mental Status: She is alert and oriented to person, place, and time.      Cranial Nerves: No cranial nerve deficit.      Coordination: Coordination normal.   Psychiatric:         Behavior: Behavior normal.         Thought Content: Thought content normal.         Judgment: Judgment normal.        Result Review :                   Assessment and Plan   Diagnoses and all orders for this visit:    1. Osteoporosis screening (Primary)  -     DEXA Bone Density Axial; Future    2. Localized osteoporosis ([place])  -     DEXA Bone Density Axial; Future    3. Acute bronchitis, unspecified organism  Comments:  Clarithromycin 500 mg twice daily    4. Subacute maxillary sinusitis  Comments:  Clarithromycin 500 twice daily    5. Non-seasonal allergic rhinitis due to pollen    6. Elevated cholesterol  Comments:  Rosuvastatin 40 mg daily  Orders:  -     Urinalysis With Microscopic If Indicated (No Culture) - Urine, Clean Catch  -     TSH  -     T4, Free  -     Lipid Panel With / Chol / HDL Ratio  -     CBC & Differential  -     Comprehensive Metabolic Panel  -     Hemoglobin A1c  -     Vitamin D,25-Hydroxy    7. Benign essential HTN  Comments:  Carvedilol 25 mg twice  daily hydrochlorothiazide 12.5 mg daily  Orders:  -     Urinalysis With Microscopic If Indicated (No Culture) - Urine, Clean Catch  -     TSH  -     T4, Free  -     Lipid Panel With / Chol / HDL Ratio  -     CBC & Differential  -     Comprehensive Metabolic Panel  -     Hemoglobin A1c  -     Vitamin D,25-Hydroxy    8. Mild intermittent asthma without complication    9. Bronchitis  -     albuterol sulfate  (90 Base) MCG/ACT inhaler; Inhale 2 puffs Every 4 (Four) Hours As Needed for Wheezing.  Dispense: 18 g; Refill: 2    10. Hypovitaminosis D  -     Vitamin D,25-Hydroxy    11. Elevated glucose  -     Hemoglobin A1c    Other orders  -     clarithromycin (BIAXIN) 500 MG tablet; Take 1 tablet by mouth 2 (Two) Times a Day.  Dispense: 20 tablet; Refill: 0             Follow Up   No follow-ups on file.  Patient was given instructions and counseling regarding her condition or for health maintenance advice. Please see specific information pulled into the AVS if appropriate.

## 2023-11-26 DIAGNOSIS — J30.1 SEASONAL ALLERGIC RHINITIS DUE TO POLLEN: ICD-10-CM

## 2023-11-27 RX ORDER — FEXOFENADINE HCL 180 MG/1
180 TABLET ORAL DAILY
Qty: 90 TABLET | Refills: 3 | Status: SHIPPED | OUTPATIENT
Start: 2023-11-27

## 2023-11-27 RX ORDER — HYDROCHLOROTHIAZIDE 12.5 MG/1
12.5 CAPSULE, GELATIN COATED ORAL EVERY MORNING
Qty: 90 CAPSULE | Refills: 1 | Status: SHIPPED | OUTPATIENT
Start: 2023-11-27

## 2023-11-27 NOTE — TELEPHONE ENCOUNTER
Rx Refill Note  Requested Prescriptions     Pending Prescriptions Disp Refills    fexofenadine (ALLEGRA) 180 MG tablet [Pharmacy Med Name: FEXOFENADINE 180MG TABLETS (OTC)] 90 tablet 3     Sig: TAKE 1 TABLET BY MOUTH DAILY      Last office visit with prescribing clinician: 11/13/2023   Last telemedicine visit with prescribing clinician: Visit date not found   Next office visit with prescribing clinician: 5/15/2024                         Would you like a call back once the refill request has been completed: [] Yes [] No    If the office needs to give you a call back, can they leave a voicemail: [] Yes [] No    Sharita Yanez  11/27/23, 08:57 EST

## 2024-02-02 DIAGNOSIS — I10 BENIGN ESSENTIAL HTN: ICD-10-CM

## 2024-02-02 RX ORDER — CARVEDILOL 25 MG/1
TABLET ORAL
Qty: 180 TABLET | Refills: 1 | Status: SHIPPED | OUTPATIENT
Start: 2024-02-02

## 2024-05-15 ENCOUNTER — OFFICE VISIT (OUTPATIENT)
Dept: INTERNAL MEDICINE | Facility: CLINIC | Age: 84
End: 2024-05-15
Payer: MEDICARE

## 2024-05-15 VITALS
DIASTOLIC BLOOD PRESSURE: 72 MMHG | WEIGHT: 203 LBS | SYSTOLIC BLOOD PRESSURE: 118 MMHG | BODY MASS INDEX: 37.13 KG/M2 | HEART RATE: 63 BPM | OXYGEN SATURATION: 98 %

## 2024-05-15 DIAGNOSIS — R73.09 ELEVATED GLUCOSE: ICD-10-CM

## 2024-05-15 DIAGNOSIS — E78.00 ELEVATED CHOLESTEROL: ICD-10-CM

## 2024-05-15 DIAGNOSIS — Z00.00 MEDICARE ANNUAL WELLNESS VISIT, SUBSEQUENT: Primary | ICD-10-CM

## 2024-05-15 DIAGNOSIS — I10 BENIGN ESSENTIAL HTN: ICD-10-CM

## 2024-05-15 DIAGNOSIS — M67.472 DIGITAL MUCOUS CYST OF TOE OF LEFT FOOT: ICD-10-CM

## 2024-05-15 DIAGNOSIS — J40 BRONCHITIS: ICD-10-CM

## 2024-05-15 DIAGNOSIS — J45.20 MILD INTERMITTENT ASTHMA WITHOUT COMPLICATION: ICD-10-CM

## 2024-05-15 DIAGNOSIS — E53.8 B12 DEFICIENCY: ICD-10-CM

## 2024-05-15 RX ORDER — ALBUTEROL SULFATE 90 UG/1
2 AEROSOL, METERED RESPIRATORY (INHALATION) EVERY 4 HOURS PRN
Qty: 18 G | Refills: 5 | Status: SHIPPED | OUTPATIENT
Start: 2024-05-15

## 2024-05-15 NOTE — PROGRESS NOTES
The ABCs of the Annual Wellness Visit  Medicare Wellness Visit    Subjective     Loulou Collier is a 84 y.o. female who presents for a Medicare Wellness Visit.    The following portions of the patient's history were reviewed and   updated as appropriate: allergies, current medications, past family history, past medical history, past social history, past surgical history, and problem list.     Compared to one year ago, the patient feels her physical   health is the same.    Compared to one year ago, the patient feels her mental   health is the same.    Recent Hospitalizations:  She was not admitted to the hospital during the last year.       Current Medical Providers:  Patient Care Team:  Abebe Mancilla MD as PCP - General (Family Medicine)    Outpatient Medications Prior to Visit   Medication Sig Dispense Refill    albuterol sulfate  (90 Base) MCG/ACT inhaler Inhale 2 puffs Every 4 (Four) Hours As Needed for Wheezing. 18 g 2    budesonide-formoterol (SYMBICORT) 160-4.5 MCG/ACT inhaler USE 2 INHALATIONS TWICE A DAY 30.6 g 3    carvedilol (COREG) 25 MG tablet TAKE 1 TABLET BY MOUTH TWICE DAILY WITH MEALS 180 tablet 1    Cholecalciferol (EQL Vitamin D3) 50 MCG (2000 UT) capsule Take  by mouth.      clarithromycin (BIAXIN) 500 MG tablet Take 1 tablet by mouth 2 (Two) Times a Day. 20 tablet 0    fexofenadine (ALLEGRA) 180 MG tablet TAKE 1 TABLET BY MOUTH DAILY 90 tablet 3    hydroCHLOROthiazide (MICROZIDE) 12.5 MG capsule TAKE 1 CAPSULE BY MOUTH EVERY MORNING 90 capsule 1    meloxicam (MOBIC) 15 MG tablet TAKE 1 TABLET BY MOUTH DAILY WITH FOOD 90 tablet 3    Probiotic Product (ALIGN PO) Take  by mouth.      rosuvastatin (Crestor) 40 MG tablet Take 1 tablet by mouth Daily. 90 tablet 3    TURMERIC PO Take 2,000 mg by mouth.      VITAMIN B COMPLEX-C PO Take  by mouth.       No facility-administered medications prior to visit.       No opioid medication identified on active medication list. I have reviewed chart for  "other potential  high risk medication/s and harmful drug interactions in the elderly.        Aspirin is not on active medication list.  Aspirin use is not indicated based on review of current medical condition/s. Risk of harm outweighs potential benefits.  .    Patient Active Problem List   Diagnosis    Elevated cholesterol    Benign essential HTN    H/O hysterectomy for benign disease    Medicare annual wellness visit, subsequent    Primary osteoarthritis of knee    History of total knee arthroplasty, left    Cervical spine crepitus    Iron deficiency anemia due to chronic blood loss    Need for vaccination    Aortic root dilation    Pulmonary nodule    Hydradenitis    Anemia    DJD (degenerative joint disease) of knee    Asthma    Non-seasonal allergic rhinitis due to pollen    Bilateral pseudophakia    Tear film insufficiency     Advance Care Planning   Advance Care Planning     Advance Directive is not on file.  ACP discussion was held with the patient during this visit. Patient has an advance directive (not in EMR), copy requested.       Objective    Vitals:    05/15/24 0911   BP: 118/72   BP Location: Left arm   Patient Position: Sitting   Cuff Size: Large Adult   Pulse: 63   SpO2: 98%   Weight: 92.1 kg (203 lb)     Estimated body mass index is 37.13 kg/m² as calculated from the following:    Height as of 11/18/22: 157.5 cm (62\").    Weight as of this encounter: 92.1 kg (203 lb).    Class 2 Severe Obesity (BMI >=35 and <=39.9). Obesity-related health conditions include the following: osteoarthritis. Obesity is improving with lifestyle modifications. BMI is is above average; BMI management plan is completed. We discussed portion control and increasing exercise.      Does the patient have evidence of cognitive impairment?   No          HEALTH RISK ASSESSMENT    Smoking Status:  Social History     Tobacco Use   Smoking Status Never   Smokeless Tobacco Never     Alcohol Consumption:  Social History     Substance " and Sexual Activity   Alcohol Use No     Fall Risk Screen:    ALBANADI Fall Risk Assessment was completed, and patient is at LOW risk for falls.Assessment completed on:5/15/2024    Depression Screen:       5/15/2024     9:14 AM   PHQ-2/PHQ-9 Depression Screening   Little Interest or Pleasure in Doing Things 0-->not at all   Feeling Down, Depressed or Hopeless 0-->not at all   PHQ-9: Brief Depression Severity Measure Score 0       Health Habits and Functional and Cognitive Screenin/15/2024     9:13 AM   Functional & Cognitive Status   Do you have difficulty preparing food and eating? No   Do you have difficulty bathing yourself, getting dressed or grooming yourself? No   Do you have difficulty using the toilet? No   Do you have difficulty moving around from place to place? No   Do you have trouble with steps or getting out of a bed or a chair? No   Current Diet Well Balanced Diet   Dental Exam Up to date   Eye Exam Up to date   Exercise (times per week) 3 times per week   Current Exercises Include Walking   Do you need help using the phone?  No   Are you deaf or do you have serious difficulty hearing?  No   Do you need help to go to places out of walking distance? No   Do you need help shopping? No   Do you need help preparing meals?  No   Do you need help with housework?  No   Do you need help with laundry? No   Do you need help taking your medications? No   Do you need help managing money? No   Do you ever drive or ride in a car without wearing a seat belt? No   Have you felt unusual stress, anger or loneliness in the last month? No   Who do you live with? Child   If you need help, do you have trouble finding someone available to you? No   Have you been bothered in the last four weeks by sexual problems? No   Do you have difficulty concentrating, remembering or making decisions? No       Age-appropriate Screening Schedule:  Refer to the list below for future screening recommendations based on patient's age,  sex and/or medical conditions. Orders for these recommended tests are listed in the plan section. The patient has been provided with a written plan.    Health Maintenance   Topic Date Due    DXA SCAN  06/01/2023    COVID-19 Vaccine (8 - 2023-24 season) 01/18/2024    ANNUAL WELLNESS VISIT  05/05/2024    TDAP/TD VACCINES (1 - Tdap) 05/22/2024 (Originally 1/30/1959)    INFLUENZA VACCINE  08/01/2024    LIPID PANEL  11/13/2024    COLORECTAL CANCER SCREENING  08/15/2032    RSV Vaccine - Adults  Completed    Pneumococcal Vaccine 65+  Completed    ZOSTER VACCINE  Completed          CMS Preventative Services Quick Reference  Risk Factors Identified During Encounter    None Identified    The above risks/problems have been discussed with the patient.  Pertinent information has been shared with the patient in the After Visit Summary.  An After Visit Summary and PPPS were made available to the patient.  There are no diagnoses linked to this encounter.  Follow Up:  Next Medicare Wellness visit to be scheduled in 1 year.        Additional E&M Note during same encounter follows:  Patient has multiple medical problems which are significant and separately identifiable that require additional work above and beyond the Medicare Wellness Visit.      Chief Complaint  Medicare Wellness-subsequent    Subjective        Damion is a delightful lady who is here for Medicare wellness.  She appears younger than her stated age.  She has some mild intermittent asthma and we will make sure she has refills on Symbicort plus albuterol HFA.    Rosuvastatin 40 mg daily is done a great job as far as cholesterol and we will continue carvedilol 25 twice daily plus hydrochlorothiazide 12.5 mg daily.    She is up-to-date on colonoscopy.    Will give her a COVID booster today with probable repeat in the fall when the new vaccine comes out.          Loulou Collier is also being seen today for Wellness    Review of Systems   All other systems reviewed and are  negative.      Objective   Vital Signs:  /72 (BP Location: Left arm, Patient Position: Sitting, Cuff Size: Large Adult)   Pulse 63   Wt 92.1 kg (203 lb)   SpO2 98%   BMI 37.13 kg/m²     Physical Exam  Vitals reviewed.   Constitutional:       Appearance: She is well-developed.   HENT:      Head: Normocephalic and atraumatic.      Right Ear: Tympanic membrane and external ear normal.      Left Ear: Tympanic membrane and external ear normal.      Nose: Nose normal.   Eyes:      Conjunctiva/sclera: Conjunctivae normal.      Pupils: Pupils are equal, round, and reactive to light.   Neck:      Thyroid: No thyromegaly.      Vascular: No JVD.   Cardiovascular:      Rate and Rhythm: Normal rate and regular rhythm.      Heart sounds: Normal heart sounds.   Pulmonary:      Effort: Pulmonary effort is normal.      Breath sounds: Normal breath sounds.   Abdominal:      General: Bowel sounds are normal.      Palpations: Abdomen is soft.   Musculoskeletal:         General: Normal range of motion.      Cervical back: Normal range of motion and neck supple.   Lymphadenopathy:      Cervical: No cervical adenopathy.   Skin:     General: Skin is warm and dry.      Findings: No rash.   Neurological:      Mental Status: She is alert and oriented to person, place, and time.      Cranial Nerves: No cranial nerve deficit.      Coordination: Coordination normal.   Psychiatric:         Behavior: Behavior normal.         Thought Content: Thought content normal.         Judgment: Judgment normal.          The following data was reviewed by: Abebe Mancilla MD on 05/15/2024:  Common labs          11/13/2023    09:45   Common Labs   Glucose 99    BUN 15    Creatinine 0.83    Sodium 145    Potassium 3.8    Chloride 106    Calcium 9.9    Total Protein 6.5    Albumin 4.4    Total Bilirubin 0.4    Alkaline Phosphatase 70    AST (SGOT) 20    ALT (SGPT) 17    WBC 5.26    Hemoglobin 12.6    Hematocrit 40.8    Platelets 201    Total Cholesterol  158    Triglycerides 43    HDL Cholesterol 58    LDL Cholesterol  91    Hemoglobin A1C 5.80                 Assessment and Plan     Assessment & Plan  Bronchitis  Refill albuterol HFA as needed continue Symbicort 2 puffs twice daily  Digital mucous cyst of toe of left foot  Referral to podiatry  Benign essential HTN   carvedilol 25 mg twice daily hydrochlorothiazide 12.5 mg daily  Elevated cholesterol    rosuvastatin 40 mg daily  Medicare annual wellness visit, subsequent    Mild intermittent asthma without complication   refill albuterol HFA continue Symbicort 2 puffs twice daily        Elevated glucose    B12 deficiency                    Follow Up   No follow-ups on file.  Patient was given instructions and counseling regarding her condition or for health maintenance advice. Please see specific information pulled into the AVS if appropriate.

## 2024-05-15 NOTE — PATIENT INSTRUCTIONS
Medicare Wellness  Personal Prevention Plan of Service     Date of Office Visit:    Encounter Provider:  Abebe Mancilla MD  Place of Service:  [facility]  Patient Name: Loulou Collier  :  1940    As part of the Medicare Wellness portion of your visit today, we are providing you with this personalized preventive plan of services (PPPS). This plan is based upon recommendations of the [place] Preventive Services Task Force (USPSTF) and the Advisory Committee on Immunization Practices (ACIP).    This lists the preventive care services that should be considered, and provides dates of when you are due. Items listed as completed are up-to-date and do not require any further intervention.    Health Maintenance   Topic Date Due    DXA SCAN  2023    COVID-19 Vaccine (2023- season) 2024    TDAP/TD VACCINES (1 - Tdap) 2024 (Originally 1959)    INFLUENZA VACCINE  2024    LIPID PANEL  2024    ANNUAL WELLNESS VISIT  05/15/2025    COLORECTAL CANCER SCREENING  08/15/2032    RSV Vaccine - Adults  Completed    Pneumococcal Vaccine 65+  Completed    ZOSTER VACCINE  Completed       Orders Placed This Encounter   Procedures    Urinalysis With Microscopic If Indicated (No Culture) - Urine, Clean Catch     Order Specific Question:   Release to patient     Answer:   Routine Release [[id number]]    TSH     Order Specific Question:   Release to patient     Answer:   Routine Release [[id number]]    T4, Free     Order Specific Question:   Release to patient     Answer:   Routine Release [[id number]]    T3, Free     Order Specific Question:   Release to patient     Answer:   Routine Release [[id number]]    Lipid Panel With / Chol / HDL Ratio     Order Specific Question:   Release to patient     Answer:   Routine Release [[id number]]    Comprehensive Metabolic Panel     Order Specific Question:   Release to patient     Answer:   Routine Release [[id number]]     Hemoglobin A1c     Order Specific Question:   Release to patient     Answer:   Routine Release [[id number]]    Vitamin B12     Order Specific Question:   Release to patient     Answer:   Routine Release [[id number]]    Ambulatory Referral to Podiatry     Referral Priority:   Routine     Referral Type:   Consultation     Referral Reason:   Specialty Services Required     Referred to Provider:   Danilo Fuentes DPM     Requested Specialty:   Podiatry     Number of Visits Requested:   1    CBC & Differential     Order Specific Question:   Manual Differential     Answer:   No     Order Specific Question:   Release to patient     Answer:   Routine Release [[id number]]       No follow-ups on file.

## 2024-05-16 LAB
ALBUMIN SERPL-MCNC: 4.1 G/DL (ref 3.5–5.2)
ALBUMIN/GLOB SERPL: 2 G/DL
ALP SERPL-CCNC: 70 U/L (ref 39–117)
ALT SERPL-CCNC: 14 U/L (ref 1–33)
APPEARANCE UR: CLEAR
AST SERPL-CCNC: 19 U/L (ref 1–32)
BASOPHILS # BLD AUTO: 0.02 10*3/MM3 (ref 0–0.2)
BASOPHILS NFR BLD AUTO: 0.4 % (ref 0–1.5)
BILIRUB SERPL-MCNC: 0.3 MG/DL (ref 0–1.2)
BILIRUB UR QL STRIP: NEGATIVE
BUN SERPL-MCNC: 15 MG/DL (ref 8–23)
BUN/CREAT SERPL: 17.4 (ref 7–25)
CALCIUM SERPL-MCNC: 9.6 MG/DL (ref 8.6–10.5)
CHLORIDE SERPL-SCNC: 103 MMOL/L (ref 98–107)
CHOLEST SERPL-MCNC: 147 MG/DL (ref 0–200)
CHOLEST/HDLC SERPL: 2.63 {RATIO}
CO2 SERPL-SCNC: 32.9 MMOL/L (ref 22–29)
COLOR UR: ABNORMAL
CREAT SERPL-MCNC: 0.86 MG/DL (ref 0.57–1)
EGFRCR SERPLBLD CKD-EPI 2021: 67.5 ML/MIN/1.73
EOSINOPHIL # BLD AUTO: 0.2 10*3/MM3 (ref 0–0.4)
EOSINOPHIL NFR BLD AUTO: 4.4 % (ref 0.3–6.2)
ERYTHROCYTE [DISTWIDTH] IN BLOOD BY AUTOMATED COUNT: 13.7 % (ref 12.3–15.4)
GLOBULIN SER CALC-MCNC: 2.1 GM/DL
GLUCOSE SERPL-MCNC: 89 MG/DL (ref 65–99)
GLUCOSE UR QL STRIP: NEGATIVE
HBA1C MFR BLD: 5.9 % (ref 4.8–5.6)
HCT VFR BLD AUTO: 41.5 % (ref 34–46.6)
HDLC SERPL-MCNC: 56 MG/DL (ref 40–60)
HGB BLD-MCNC: 13 G/DL (ref 12–15.9)
HGB UR QL STRIP: NEGATIVE
IMM GRANULOCYTES # BLD AUTO: 0 10*3/MM3 (ref 0–0.05)
IMM GRANULOCYTES NFR BLD AUTO: 0 % (ref 0–0.5)
KETONES UR QL STRIP: NEGATIVE
LDLC SERPL CALC-MCNC: 81 MG/DL (ref 0–100)
LEUKOCYTE ESTERASE UR QL STRIP: NEGATIVE
LYMPHOCYTES # BLD AUTO: 1.38 10*3/MM3 (ref 0.7–3.1)
LYMPHOCYTES NFR BLD AUTO: 30.1 % (ref 19.6–45.3)
MCH RBC QN AUTO: 27.3 PG (ref 26.6–33)
MCHC RBC AUTO-ENTMCNC: 31.3 G/DL (ref 31.5–35.7)
MCV RBC AUTO: 87 FL (ref 79–97)
MONOCYTES # BLD AUTO: 0.27 10*3/MM3 (ref 0.1–0.9)
MONOCYTES NFR BLD AUTO: 5.9 % (ref 5–12)
NEUTROPHILS # BLD AUTO: 2.72 10*3/MM3 (ref 1.7–7)
NEUTROPHILS NFR BLD AUTO: 59.2 % (ref 42.7–76)
NITRITE UR QL STRIP: NEGATIVE
NRBC BLD AUTO-RTO: 0 /100 WBC (ref 0–0.2)
PH UR STRIP: 6 [PH] (ref 5–8)
PLATELET # BLD AUTO: 171 10*3/MM3 (ref 140–450)
POTASSIUM SERPL-SCNC: 4.5 MMOL/L (ref 3.5–5.2)
PROT SERPL-MCNC: 6.2 G/DL (ref 6–8.5)
PROT UR QL STRIP: NEGATIVE
RBC # BLD AUTO: 4.77 10*6/MM3 (ref 3.77–5.28)
SODIUM SERPL-SCNC: 144 MMOL/L (ref 136–145)
SP GR UR STRIP: 1.02 (ref 1–1.03)
T3FREE SERPL-MCNC: 3 PG/ML (ref 2–4.4)
T4 FREE SERPL-MCNC: 1.16 NG/DL (ref 0.93–1.7)
TRIGL SERPL-MCNC: 47 MG/DL (ref 0–150)
TSH SERPL DL<=0.005 MIU/L-ACNC: 2.08 UIU/ML (ref 0.27–4.2)
UROBILINOGEN UR STRIP-MCNC: ABNORMAL MG/DL
VIT B12 SERPL-MCNC: 656 PG/ML (ref 211–946)
VLDLC SERPL CALC-MCNC: 10 MG/DL (ref 5–40)
WBC # BLD AUTO: 4.59 10*3/MM3 (ref 3.4–10.8)

## 2024-05-16 NOTE — PROGRESS NOTES
Please let her know that her labs are overall normal including an excellent cholesterol panel.  No diabetes.

## 2024-05-30 ENCOUNTER — OFFICE VISIT (OUTPATIENT)
Dept: INTERNAL MEDICINE | Facility: CLINIC | Age: 84
End: 2024-05-30
Payer: MEDICARE

## 2024-05-30 ENCOUNTER — HOSPITAL ENCOUNTER (OUTPATIENT)
Facility: HOSPITAL | Age: 84
Discharge: HOME | End: 2024-05-30
Payer: MEDICARE

## 2024-05-30 ENCOUNTER — PRIOR AUTHORIZATION (OUTPATIENT)
Dept: INTERNAL MEDICINE | Facility: CLINIC | Age: 84
End: 2024-05-30
Payer: MEDICARE

## 2024-05-30 VITALS
HEART RATE: 66 BPM | BODY MASS INDEX: 37.39 KG/M2 | WEIGHT: 203.2 LBS | TEMPERATURE: 97.7 F | DIASTOLIC BLOOD PRESSURE: 81 MMHG | SYSTOLIC BLOOD PRESSURE: 138 MMHG | HEIGHT: 62 IN | OXYGEN SATURATION: 99 %

## 2024-05-30 DIAGNOSIS — M25.512 ACUTE PAIN OF BOTH SHOULDERS: Primary | ICD-10-CM

## 2024-05-30 DIAGNOSIS — M25.512 ACUTE PAIN OF BOTH SHOULDERS: ICD-10-CM

## 2024-05-30 DIAGNOSIS — M25.511 ACUTE PAIN OF BOTH SHOULDERS: Primary | ICD-10-CM

## 2024-05-30 DIAGNOSIS — M25.511 ACUTE PAIN OF BOTH SHOULDERS: ICD-10-CM

## 2024-05-30 PROCEDURE — 1125F AMNT PAIN NOTED PAIN PRSNT: CPT

## 2024-05-30 PROCEDURE — 73030 X-RAY EXAM OF SHOULDER: CPT

## 2024-05-30 PROCEDURE — 3075F SYST BP GE 130 - 139MM HG: CPT

## 2024-05-30 PROCEDURE — 99213 OFFICE O/P EST LOW 20 MIN: CPT

## 2024-05-30 PROCEDURE — 3079F DIAST BP 80-89 MM HG: CPT

## 2024-05-30 RX ORDER — LIDOCAINE 50 MG/G
1 PATCH TOPICAL EVERY 24 HOURS
Qty: 14 EACH | Refills: 1 | Status: SHIPPED | OUTPATIENT
Start: 2024-05-30

## 2024-05-30 NOTE — PROGRESS NOTES
Chief Complaint  Shoulder Pain     Subjective:      History of Present Illness {CC  Problem List  Visit  Diagnosis   Encounters  Notes  Medications  Labs  Result Review Imaging  Media :23}     Loulou Collier presents to [facility] for:      History of Present Illness  Pt states this began a few weeks ago. Pt does not remember any trauma to the area. Pt states this is consistent and pt is unable to lay flat without pain. Pt has used a heating pack and tylenol. Pt states the same issue is starting to occur on her L shoulder. Pt has not noted any redness or rashes around the joints.   Shoulder Injury   The right shoulder is affected. The incident occurred more than 1 week ago. There was no injury mechanism. The quality of the pain is described as aching and shooting. The pain radiates to the right arm. Associated symptoms include muscle weakness. Pertinent negatives include no numbness or tingling. The symptoms are aggravated by movement. She has tried acetaminophen and heat for the symptoms. The treatment provided mild relief.            I have reviewed patient's medical history, any new submitted information provided by patient or medical assistant and updated medical record.      Objective:      Physical Exam  Vitals reviewed.   Constitutional:       Appearance: Normal appearance.   HENT:      Head: Normocephalic.   Cardiovascular:      Rate and Rhythm: Normal rate and regular rhythm.      Pulses: Normal pulses.      Heart sounds: Normal heart sounds.   Pulmonary:      Effort: Pulmonary effort is normal.      Breath sounds: Normal breath sounds.   Musculoskeletal:      Right shoulder: Tenderness present. Decreased range of motion. Decreased strength.      Left shoulder: Tenderness present. Decreased range of motion. Decreased strength.      Comments: Pt unable to abduct, adduct or raise arms bilaterally without pain and tenderness   Skin:     General: Skin is warm and  "dry.      Capillary Refill: Capillary refill takes less than 2 seconds.   Neurological:      General: No focal deficit present.      Mental Status: She is alert.   Psychiatric:         Mood and Affect: Mood normal.         Behavior: Behavior normal.        Result Review  Data Reviewed:{ Labs  Result Review  Imaging  Med Tab  Media :23}                Vital Signs:   /81 (BP Location: Left arm, Patient Position: Sitting, Cuff Size: Large Adult)   Pulse 66   Temp 97.7 °F (36.5 °C) (Oral)   Ht 157.5 cm (62\")   Wt 92.2 kg (203 lb 3.2 oz)   SpO2 99%   BMI 37.17 kg/m²   Estimated body mass index is 37.17 kg/m² as calculated from the following:    Height as of this encounter: 157.5 cm (62\").    Weight as of this encounter: 92.2 kg (203 lb 3.2 oz).        Requested Prescriptions     Signed Prescriptions Disp Refills    lidocaine (LIDODERM) 5 % 14 each 1     Sig: Place 1 patch on the skin as directed by provider Daily. Remove & Discard patch within 12 hours or as directed by MD       Routine medications provided by this office will also be refilled via pharmacy request.       Current Outpatient Medications:     albuterol sulfate  (90 Base) MCG/ACT inhaler, Inhale 2 puffs Every 4 (Four) Hours As Needed for Wheezing., Disp: 18 g, Rfl: 5    budesonide-formoterol (SYMBICORT) 160-4.5 MCG/ACT inhaler, USE 2 INHALATIONS TWICE A DAY, Disp: 30.6 g, Rfl: 3    carvedilol (COREG) 25 MG tablet, TAKE 1 TABLET BY MOUTH TWICE DAILY WITH MEALS, Disp: 180 tablet, Rfl: 1    Cholecalciferol (EQL Vitamin D3) 50 MCG (2000 UT) capsule, Take  by mouth., Disp: , Rfl:     fexofenadine (ALLEGRA) 180 MG tablet, TAKE 1 TABLET BY MOUTH DAILY, Disp: 90 tablet, Rfl: 3    hydroCHLOROthiazide (MICROZIDE) 12.5 MG capsule, TAKE 1 CAPSULE BY MOUTH EVERY MORNING, Disp: 90 capsule, Rfl: 1    meloxicam (MOBIC) 15 MG tablet, TAKE 1 TABLET BY MOUTH DAILY WITH FOOD, Disp: 90 tablet, Rfl: 3    Probiotic Product (ALIGN PO), Take  by mouth., Disp: " , Rfl:     rosuvastatin (Crestor) 40 MG tablet, Take 1 tablet by mouth Daily., Disp: 90 tablet, Rfl: 3    TURMERIC PO, Take 2,000 mg by mouth., Disp: , Rfl:     VITAMIN B COMPLEX-C PO, Take  by mouth., Disp: , Rfl:     lidocaine (LIDODERM) 5 %, Place 1 patch on the skin as directed by provider Daily. Remove & Discard patch within 12 hours or as directed by MD, Disp: 14 each, Rfl: 1     Assessment and Plan:      Assessment and Plan {CC Problem List  Visit Diagnosis  ROS  Review (Popup)  Health Maintenance  Quality  BestPractice  Medications  SmartSets  SnapShot Encounters  Media :23}     Diagnoses and all orders for this visit:    1. Acute pain of both shoulders (Primary)  -     Ambulatory Referral to Orthopedic Surgery  -     XR Shoulder 2+ View Right; Future  -     XR Shoulder 2+ View Left; Future  -     lidocaine (LIDODERM) 5 %; Place 1 patch on the skin as directed by provider Daily. Remove & Discard patch within 12 hours or as directed by MD  Dispense: 14 each; Refill: 1             New Medications Ordered This Visit   Medications    lidocaine (LIDODERM) 5 %     Sig: Place 1 patch on the skin as directed by provider Daily. Remove & Discard patch within 12 hours or as directed by MD     Dispense:  14 each     Refill:  1       Will send pt to ortho. Will get xrays and review with pt once available. Educated to RICE. Can potentially do muscle relaxers, but would like to just try lidocaine first. Pt verbalized understanding and is comfortable with the plan of care.     Follow Up {Instructions Charge Capture  Follow-up Communications :23}     Return if symptoms worsen or fail to improve.      Patient was given instructions and counseling regarding her condition or for health maintenance advice. Please see specific information pulled into the AVS if appropriate.    Dragon disclaimer:   Much of this encounter note is an electronic transcription/translation of spoken language to printed text. The  electronic translation of spoken language may permit erroneous, or at times, nonsensical words or phrases to be inadvertently transcribed; Although I have reviewed the note for such errors, some may still exist.     Additional Patient Counseling:       There are no Patient Instructions on file for this visit.

## 2024-06-14 NOTE — PROGRESS NOTES
New Shoulder      Patient: Loulou Collier        YOB: 1940    Medical Record Number: [id number]        Chief Complaints: Bilateral shoulder pain      History of Present Illness: This is a 84-year-old female is right-hand-dominant presents with bilateral shoulder pain right greater than left this ongoing for about 6 weeks.  I saw her in 2019 for the right shoulder she is 84 and looks younger than her stated age she is a retired principal.  She states her shoulders are bothering her at night bothering her with activity and no history injury change in activity past medical history as listed below and reviewed by me      Allergies:   Allergies   Allergen Reactions    Penicillamine Rash    Cephalosporins Hives    Lisinopril Other (See Comments)     Cough    Penicillins Hives       Medications:   Home Medications:  Current Outpatient Medications on File Prior to Visit   Medication Sig    albuterol sulfate  (90 Base) MCG/ACT inhaler Inhale 2 puffs Every 4 (Four) Hours As Needed for Wheezing.    budesonide-formoterol (SYMBICORT) 160-4.5 MCG/ACT inhaler USE 2 INHALATIONS TWICE A DAY    carvedilol (COREG) 25 MG tablet TAKE 1 TABLET BY MOUTH TWICE DAILY WITH MEALS    Cholecalciferol (EQL Vitamin D3) 50 MCG (2000 UT) capsule Take  by mouth.    fexofenadine (ALLEGRA) 180 MG tablet TAKE 1 TABLET BY MOUTH DAILY    hydroCHLOROthiazide (MICROZIDE) 12.5 MG capsule TAKE 1 CAPSULE BY MOUTH EVERY MORNING    lidocaine (LIDODERM) 5 % Place 1 patch on the skin as directed by provider Daily. Remove & Discard patch within 12 hours or as directed by MD    meloxicam (MOBIC) 15 MG tablet TAKE 1 TABLET BY MOUTH DAILY WITH FOOD    Probiotic Product (ALIGN PO) Take  by mouth.    rosuvastatin (Crestor) 40 MG tablet Take 1 tablet by mouth Daily.    TURMERIC PO Take 2,000 mg by mouth.    VITAMIN B COMPLEX-C PO Take  by mouth.     No current facility-administered medications on file prior to visit.     Current  Medications:  Scheduled Meds:  Continuous Infusions:No current facility-administered medications for this visit.    PRN Meds:.    Past Medical History:   Diagnosis Date    Arthritis     Asthma inhaler    when needed    High cholesterol     Hypertension         Past Surgical History:   Procedure Laterality Date    BREAST EXCISIONAL BIOPSY Left 1998    benign    COLONOSCOPY N/A 01/2020    COLONOSCOPY N/A 8/15/2022    Procedure: COLONOSCOPY TO CECUM AND TI;  Surgeon: Sally Nuno MD;  Location: [facility] ENDOSCOPY;  Service: General;  Laterality: N/A;  SCREENING  --DIVERTICULOSIS    HYSTERECTOMY      JOINT REPLACEMENT      RT KNEE    KNEE SURGERY      TOTAL KNEE ARTHROPLASTY Left 8/2/2017    Procedure: TOTAL KNEE ARTHROPLASTY WITH NITIN NAVIGATION;  Surgeon: Rui Craft MD;  Location: [facility] OR;  Service:         Social History     Occupational History    Occupation: retired    Tobacco Use    Smoking status: Never    Smokeless tobacco: Never   Vaping Use    Vaping status: Never Used   Substance and Sexual Activity    Alcohol use: No    Drug use: No    Sexual activity: Not Currently     Partners: Male     Birth control/protection: None      Social History     Social History Narrative    Not on file        Family History   Problem Relation Age of Onset    Hypertension Other     Cancer Other     Diabetes Other     No Known Problems Mother     No Known Problems Father     No Known Problems Sister     No Known Problems Brother     No Known Problems Daughter     No Known Problems Son     No Known Problems Maternal Grandmother     No Known Problems Paternal Grandmother     Breast cancer Maternal Aunt     No Known Problems Paternal Aunt     BRCA 1/2 Neg Hx     Colon cancer Neg Hx     Endometrial cancer Neg Hx     Ovarian cancer Neg Hx     Malig Hyperthermia Neg Hx              Review of Systems:     Review of Systems      Physical Exam: 84 y.o. female  General Appearance:    Alert,  "cooperative, in no acute distress                   Vitals:    06/17/24 0916   Temp: 97.8 °F (36.6 °C)   Weight: 94.2 kg (207 lb 9.6 oz)   Height: 157.5 cm (62\")   PainSc:   5      Patient is alert and read ×3 no acute distress appears her above-listed at height weight and age.  Affect is normal respiratory rate is normal unlabored. Heart rate regular rate rhythm, sclera, dentition and hearing are normal for the purpose of this exam.    Ortho Exam  Physical exam of the right shoulder reveals no overlying skin changes no lymphedema no lymphadenopathy.  Patient has active flexion 180 with mild symptoms abduction is similar external rotation is to 50 and internal rotation to the upper lumbar spine with mild symptoms.  Patient has good rotator cuff strength 4+ over 5 with isometric strength testing with pain.  Patient has a positive impingement and a positive Reno sign.  Patient has good cervical range of motion which is full and asymptomatic no radicular symptoms.  Patient has a normal elbow exam.  Good distal pulses are present  Patient has pain with overhead activity and a positive Neer sign and a positive empty can sign , a positive drop arm and a definitive painful arc     Physical exam of the left shoulder reveals no overlying skin changes no lymphedema no lymphadenopathy.  Patient has active flexion 180 with mild symptoms abduction is similar external rotation is to 50 and internal rotation to the upper lumbar spine with mild symptoms.  Patient has good rotator cuff strength 4+ over 5 with isometric strength testing with pain.  Patient has a positive impingement and a positive Reno sign.  Patient has good cervical range of motion which is full and asymptomatic no radicular symptoms.  Patient has a normal elbow exam.  Good distal pulses are presentPatient has pain with overhead activity and a positive Neer sign and a positive empty can sign  They have a positive drop arm any definitive painful arc   Large " Joint Arthrocentesis: R subacromial bursa  Date/Time: 6/17/2024 9:26 AM  Consent given by: patient  Site marked: site marked  Timeout: Immediately prior to procedure a time out was called to verify the correct patient, procedure, equipment, support staff and site/side marked as required   Supporting Documentation  Indications: pain   Procedure Details  Location: shoulder - R subacromial bursa  Preparation: Patient was prepped and draped in the usual sterile fashion  Needle gauge: 21G.  Approach: posterior  Medications administered: 80 mg methylPREDNISolone acetate 80 MG/ML; 2 mL lidocaine PF 1% 1 %  Patient tolerance: patient tolerated the procedure well with no immediate complications      Large Joint Arthrocentesis: L subacromial bursa  Date/Time: 6/17/2024 9:26 AM  Consent given by: patient  Site marked: site marked  Timeout: Immediately prior to procedure a time out was called to verify the correct patient, procedure, equipment, support staff and site/side marked as required   Supporting Documentation  Indications: pain   Procedure Details  Location: shoulder - L subacromial bursa  Preparation: Patient was prepped and draped in the usual sterile fashion  Needle gauge: 21G.  Approach: posterior  Medications administered: 80 mg methylPREDNISolone acetate 80 MG/ML; 2 mL lidocaine PF 1% 1 %  Patient tolerance: patient tolerated the procedure well with no immediate complications            Radiology:   , Scapular Y and Axillary Lateral of the   Right and left shoulder were ordered/reviewed to evauate shoulder pain.  She has some acromioclavicular arthritis otherwise a heads well-seated within the glenoid no acute pathology I did compare on the right to x-rays done in 2019 no significant change  Imaging Results (Most Recent)       Procedure Component Value Units Date/Time    XR Shoulder 2+ View Bilateral [[id number]] Resulted: 06/17/24 0913     Updated: 06/17/24 0913    Impression:      Ordering physician's impression  is located in the Encounter Note dated 06/17/24. X-ray performed in the DR room.          Assessment/Plan: Bilateral shoulder pain she does have some acromioclavicular arthritis the joint itself looks fine I think this is mostly rotator cuff I think an injection would be reasonable and then probably have her see physical therapy to work on postural muscles and cuff and core strengthening should she fail that I would consider other means of testing I do think injecting 2 there is a slightly higher risk of problems but I think it is quite reasonable is doing this patient  Cortisone Injection. See procedure note.  Cortisone Injection for DIAGNOSTIC and THERAPUTIC purposes.

## 2024-06-17 ENCOUNTER — OFFICE VISIT (OUTPATIENT)
Dept: ORTHOPEDIC SURGERY | Facility: CLINIC | Age: 84
End: 2024-06-17
Payer: MEDICARE

## 2024-06-17 VITALS — WEIGHT: 207.6 LBS | TEMPERATURE: 97.8 F | BODY MASS INDEX: 38.2 KG/M2 | HEIGHT: 62 IN

## 2024-06-17 DIAGNOSIS — R52 PAIN: Primary | ICD-10-CM

## 2024-06-17 DIAGNOSIS — M75.40 IMPINGEMENT SYNDROME OF SHOULDER REGION, UNSPECIFIED LATERALITY: ICD-10-CM

## 2024-06-17 PROCEDURE — 1159F MED LIST DOCD IN RCRD: CPT | Performed by: ORTHOPAEDIC SURGERY

## 2024-06-17 PROCEDURE — 73030 X-RAY EXAM OF SHOULDER: CPT | Performed by: ORTHOPAEDIC SURGERY

## 2024-06-17 PROCEDURE — 99204 OFFICE O/P NEW MOD 45 MIN: CPT | Performed by: ORTHOPAEDIC SURGERY

## 2024-06-17 PROCEDURE — 20610 DRAIN/INJ JOINT/BURSA W/O US: CPT | Performed by: ORTHOPAEDIC SURGERY

## 2024-06-17 PROCEDURE — 1160F RVW MEDS BY RX/DR IN RCRD: CPT | Performed by: ORTHOPAEDIC SURGERY

## 2024-06-17 RX ORDER — LIDOCAINE HYDROCHLORIDE 10 MG/ML
2 INJECTION, SOLUTION EPIDURAL; INFILTRATION; INTRACAUDAL; PERINEURAL
Status: COMPLETED | OUTPATIENT
Start: 2024-06-17 | End: 2024-06-17

## 2024-06-17 RX ORDER — METHYLPREDNISOLONE ACETATE 80 MG/ML
80 INJECTION, SUSPENSION INTRA-ARTICULAR; INTRALESIONAL; INTRAMUSCULAR; SOFT TISSUE
Status: COMPLETED | OUTPATIENT
Start: 2024-06-17 | End: 2024-06-17

## 2024-06-17 RX ADMIN — METHYLPREDNISOLONE ACETATE 80 MG: 80 INJECTION, SUSPENSION INTRA-ARTICULAR; INTRALESIONAL; INTRAMUSCULAR; SOFT TISSUE at 09:26

## 2024-06-17 RX ADMIN — LIDOCAINE HYDROCHLORIDE 2 ML: 10 INJECTION, SOLUTION EPIDURAL; INFILTRATION; INTRACAUDAL; PERINEURAL at 09:26

## 2024-07-03 ENCOUNTER — TREATMENT (OUTPATIENT)
Dept: PHYSICAL THERAPY | Facility: CLINIC | Age: 84
End: 2024-07-03
Payer: MEDICARE

## 2024-07-03 DIAGNOSIS — M25.611 DECREASED RANGE OF MOTION OF RIGHT SHOULDER: ICD-10-CM

## 2024-07-03 DIAGNOSIS — R29.898 DECREASED STRENGTH OF UPPER EXTREMITY: Primary | ICD-10-CM

## 2024-07-03 NOTE — PROGRESS NOTES
Physical Therapy Initial Evaluation and Plan of Care  [facility] Physical Therapy [address], [address] [place] [id number]     Patient: Loulou Collier   : 1940  Referring practitioner: Paulina Sanderson MD  Date of Initial Visit: 7/3/2024  Today's Date: 7/3/2024  Patient seen for 1 sessions  PT Clinic location: [address], [address]  [address]          Visit Diagnoses:    ICD-10-CM ICD-9-CM   1. Decreased strength of upper extremity  R29.898 729.89   2. Decreased range of motion of right shoulder  M25.611 719.51       Subjective Questionnaire: QuickDASH: 37.5    Subjective Evaluation    History of Present Illness  Mechanism of injury: I have been having some shoulder pain for a while but it got more intense in April. The right side is worse but the left side is starting to be bothersome. The pain is along my neck and past the shoulder. I got an injection when I went to see Dr. Sanderson and this helped some. Before the injection I couldn't raise my arms at all because of the pain, now I can a little bit but I still feel the pain just not as severe.   I have a hard time with lifting and occasionally with sleeping.   I do not have any numbness or tingling at the moment but at the beginning I had a little but nothing past my elbow.      Patient Occupation: Retired Quality of life: good    Pain  Current pain ratin  At best pain ratin  At worst pain ratin  Quality: dull ache and burning  Aggravating factors: overhead activity, lifting, sleeping, outstretched reach and repetitive movement    Diagnostic Tests  X-ray: abnormal (AC joint arthritis)    Patient Goals  Patient goals for therapy: decreased pain, increased motion, increased strength, independence with ADLs/IADLs and return to sport/leisure activities  Patient goal: I want to be able to gain more mobility and strength in my shoulders.       Medical history: HTN, Elevated  cholesterol, B TKR. See chart for further detail.   Therapy Precautions: N/A      Objective          Static Posture     Head  Forward.    Shoulders  Rounded.    Palpation   Left   No palpable tenderness to the biceps.   Tenderness of the anterior deltoid, biceps, levator scapulae, middle deltoid, pectoralis major, pectoralis minor, supraspinatus, teres major, teres minor and upper trapezius.     Right Tenderness of the anterior deltoid, biceps, levator scapulae, middle deltoid, pectoralis major, pectoralis minor, supraspinatus, teres major, teres minor and upper trapezius.     Tenderness     Left Shoulder   Tenderness in the AC joint, acromion, coracoid process and supraspinatus tendon.     Right Shoulder  Tenderness in the AC joint, acromion, coracoid process and supraspinatus tendon.     Active Range of Motion   Left Shoulder   Flexion: 150 degrees with pain  Abduction: 135 degrees with pain  External rotation 0°: 70 degrees     Right Shoulder   Flexion: 120 degrees with pain  Abduction: 130 degrees   External rotation 0°: 60 degrees with pain    Joint Play   Left Shoulder  Hypomobile in the posterior capsule and inferior capsule.    Right Shoulder  Hypomobile in the posterior capsule and inferior capsule.     Strength/Myotome Testing     Left Shoulder     Planes of Motion   Flexion: 4   Abduction: 4   External rotation at 0°: 4   Internal rotation at 0°: 4     Right Shoulder     Planes of Motion   Flexion: 4-   Abduction: 4   External rotation at 0°: 4   Internal rotation at 0°: 4     Tests   Cervical     Left   Negative ULTT1.     Right   Negative ULTT1.     Left Shoulder   Positive Hawkin's and Neer's.   Negative empty can and full can.     Right Shoulder   Positive empty can, full can, Hawkin's and Neer's.           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 Upon initial evaluation she presents with the following deficits and impairments: bilateral limited shoulder ROM, decreased bilateral UE strength, and postural deficits. These deficits make it difficult for the patient to complete ADLs such as cleaning and yard work, reaching behind her head to wash and fix hair, and reaching above head to reach into a cabinet. She also has a difficult time sleeping and lifting. Pt would benefit from skilled PT intervention to address the deficits noted and to improve overall quality of life.   Prognosis: good    Goals  Plan Goals: SHORT TERM GOALS: 4 weeks  1. Patient to be compliant with HEP and no difficulty with sleeping  2. Increased (B) UE strength to 4/5 with no pain> 3/10 to allow for household and work activities.   3. Pt to exhibit (B) shoulder active flexion / ABD to 140° in standing/sitting to assist with reaching overhead with less pain to wash hair and change shirt.  4. Pt demonstrates improved posture in sitting and standing for 30 mins with minimal-no cues during treatment session to help decrease the stress on the shoulders.      LONG TERM GOALS: 8 weeks  1. Pt score <20% perceived disability on Quick DASH.  2. Pt. to exhibit (B) shoulder AROM to WFL (> 165°) flex/abd. to allow for reaching overhead and behind back without pain limiting function to perform dressing and reach higher shelves.  3. Pt to exhibit 4+/5 UE strength to allow for pushing/pulling and lifting >5 #to occur with pain <2/10 to help with household cleaning, grocery shopping and recreational activities.   4. Pt able  to reach overhead and lift 10# (B) x 10 to allow for return to doing work around home and help improve ability to return to exercise without restriction.        Plan  Therapy options: will be seen for skilled therapy services  Planned modality interventions: cryotherapy, electrical stimulation/Russian stimulation, TENS, thermotherapy (hydrocollator packs), ultrasound and dry needling  Planned therapy interventions: ADL retraining, flexibility, body mechanics training, home exercise program, functional ROM exercises, joint mobilization, manual therapy, neuromuscular re-education, postural training, soft tissue mobilization, spinal/joint mobilization, strengthening, stretching and therapeutic activities  Frequency: 2x week  Duration in weeks: 8  Treatment plan discussed with: patient        See flowsheets for treatment detail.  Education: Discussed underlying pathology, HEP, and POC.     History # of Personal Factors and/or Comorbidities: LOW (0)  Examination of Body System(s): # of elements: LOW (1-2)  Clinical Presentation: STABLE   Clinical Decision Making: LOW       Timed:         Manual Therapy:    -     mins  71976;     Therapeutic Exercise:    10     mins  01713;     Neuromuscular David:    7    mins  33116;    Therapeutic Activity:     -     mins  82017;     Gait Training:           mins  67896;     Ultrasound:          mins  21026;    Ionto                                   mins   65768  Self Care                       8     mins   14581      Un-Timed:  Electrical Stimulation:         mins  40757 ( );  Dry Needling          mins self-pay  Traction          mins 55923  Low Eval     30     Mins  04059  Mod Eval     -     Mins  35072  High Eval                       -     Mins  11320  Re-Eval                               mins  23397      Timed Treatment:   25   mins   Total Treatment:     55   mins    PT SIGNATURE: Jessica Sauer PT   [place] PT license #: [id number]  DATE TREATMENT INITIATED:  7/3/2024    Initial Certification  Certification Period: 9/30/2024  I certify that the therapy services are furnished while this patient is under my care.  The services outlined above are required by this patient, and will be reviewed every 90 days.    PHYSICIAN: Paulina Sanderson MD  NPI: [id number]                                      DATE:     Please sign and return via fax to [facility] - Fax #: [phone]. Thank you, [facility].

## 2024-07-05 ENCOUNTER — TREATMENT (OUTPATIENT)
Dept: PHYSICAL THERAPY | Facility: CLINIC | Age: 84
End: 2024-07-05
Payer: MEDICARE

## 2024-07-05 DIAGNOSIS — R29.898 DECREASED STRENGTH OF UPPER EXTREMITY: Primary | ICD-10-CM

## 2024-07-05 DIAGNOSIS — M25.611 DECREASED RANGE OF MOTION OF RIGHT SHOULDER: ICD-10-CM

## 2024-07-05 NOTE — PROGRESS NOTES
Physical Therapy Daily Treatment Note  [facility] Physical Therapy [place]  [address], [address]  [place] [id number]     Patient: Loulou Collier  : 1940  Referring practitioner: Paulina Sanderson MD  Today's Date: 2024    VISIT#: 2    Visit Diagnoses:    ICD-10-CM ICD-9-CM   1. Decreased strength of upper extremity  R29.898 729.89   2. Decreased range of motion of right shoulder  M25.611 719.51       Subjective   Loulou Collier reports: Patient reports that she is doing pretty good, exercises are going well.       Objective       See Exercise, Manual, and Modality Logs for complete treatment.     Patient Education: HEP review  Exercise rationale/ pain free exercise performance  Alternate exercise positions  Verbal/Tactile cues to ensure correct exercise performance/technique       Assessment/Plan  Patient demonstrated good tolerance to therapeutic exercises on this date with no report of increased shoulder pain throughout. She reports some genal shoulder fatigue throughout. Added side lying shoulder ER for stabilization, she requires verbal cues throughout to keep shoulder and elbow in a proper position. Worked on slow and controlled movements throughout. Also, add prone rows and shoulder extension. Will continue to progress shoulder strength and stabilization as tolerated. ROM is progressing well while working in a pain free ROM.       Progress per Plan of Care and Progress strengthening /stabilization /functional activity          Timed:         Manual Therapy:    -     mins  21373;     Therapeutic Exercise:    10     mins  11743;     Neuromuscular David:    20    mins  49397;    Therapeutic Activity:     -     mins  52136;     Gait Training:           mins  17573;     Ultrasound:          mins  82128;    Ionto:                                   mins  90249  Self Care:                       -     mins  84571    Un-Timed:  Electrical Stimulation:         mins  69412 (  );  Dry Needling          mins self-pay  Traction          mins 81850  Re-Eval                               mins  24765  Group Therapy           ___ mins 82900    Timed Treatment:   30   mins   Total Treatment:     51   mins    Jessica Sauer PT  Physical Therapist  [facility] license #: [id number]

## 2024-07-11 ENCOUNTER — TREATMENT (OUTPATIENT)
Dept: PHYSICAL THERAPY | Facility: CLINIC | Age: 84
End: 2024-07-11
Payer: MEDICARE

## 2024-07-11 DIAGNOSIS — M25.611 DECREASED RANGE OF MOTION OF RIGHT SHOULDER: ICD-10-CM

## 2024-07-11 DIAGNOSIS — R29.898 DECREASED STRENGTH OF UPPER EXTREMITY: Primary | ICD-10-CM

## 2024-07-11 NOTE — PROGRESS NOTES
Physical Therapy Daily Treatment Note    [facility] Physical Therapy [place]  [address], [address]  [place] [address]    Visit # 3        Patient: Loulou Collier   : 1940  Referring practitioner: Paulina Sanderson MD  Date of Initial Evaluation:  Type: THERAPY  Noted: 7/3/2024  Today's Date: 2024           ICD-10-CM ICD-9-CM   1. Decreased strength of upper extremity  R29.898 729.89   2. Decreased range of motion of right shoulder  M25.611 719.51       Subjective  Loulou Collier reports:   I am feeling good today.  I still feel a little more pain in my (R) shoulder vs my (L), but it is getting better.    Objective   See Exercise, Manual, and Modality Logs for complete treatment   Note: any exercises/interventions not performed today have been marked as deferred on flowsheets.     Pt Education:  HEP review  Exercise rationale/ pain free exercise performance  Anatomy and structure of affected musculature  Posture/Postural awareness  Alternate exercise positions  Verbal/Tactile cues to ensure correct exercise performance/technique    Assessment/Plan  Tolerated continued progression of therapeutic exercise/therapeutic activity/NMR well today, no increased pain reported during or after session.   and practice.     Would continue to benefit from skilled PT progressing with functional AROM and strength (B) UE.     Progress per Plan of Care and Progress strengthening /stabilization /functional activity, progress therapeutic exercise/HEP per pt. tolerance.       Timed:         Manual Therapy:         mins  27774     Therapeutic Exercise:     20    mins  82816     Neuromuscular David:    10    mins  44531    Therapeutic Activity:          mins  63978     Gait Training:           mins  04137     Ultrasound:          mins  14648    Ionto                                   mins  19222  Self Care                            mins  34240    Un-Timed:  Electrical Stimulation:         mins 36803  ( )  Traction          mins 64581    Timed Treatment:   30   mins   Total Treatment:     45   mins    BABAK Mooney License #[id number]  Physical Therapist Assistant

## 2024-07-15 ENCOUNTER — TREATMENT (OUTPATIENT)
Dept: PHYSICAL THERAPY | Facility: CLINIC | Age: 84
End: 2024-07-15
Payer: MEDICARE

## 2024-07-15 ENCOUNTER — TRANSCRIBE ORDERS (OUTPATIENT)
Dept: ADMINISTRATIVE | Facility: HOSPITAL | Age: 84
End: 2024-07-15
Payer: MEDICARE

## 2024-07-15 DIAGNOSIS — M25.611 DECREASED RANGE OF MOTION OF RIGHT SHOULDER: ICD-10-CM

## 2024-07-15 DIAGNOSIS — R29.898 DECREASED STRENGTH OF UPPER EXTREMITY: Primary | ICD-10-CM

## 2024-07-15 DIAGNOSIS — Z12.31 SCREENING MAMMOGRAM FOR BREAST CANCER: Primary | ICD-10-CM

## 2024-07-15 NOTE — PROGRESS NOTES
Physical Therapy Daily Treatment Note    [facility] Therapy [place]  [address], [address]  [place] [address]    Visit # 4        Patient: Loulou Collier   : 1940  Referring practitioner: Paulina Sanderson MD  Date of Initial Evaluation:  Type: THERAPY  Noted: 7/3/2024  Today's Date: 7/15/2024           ICD-10-CM ICD-9-CM   1. Decreased strength of upper extremity  R29.898 729.89   2. Decreased range of motion of right shoulder  M25.611 719.51       Subjective  Loulou Collier reports:   Mild discomfort in both shoulders noted today, noting significant through the weekend.      Objective   See Exercise, Manual, and Modality Logs for complete treatment   Note: any exercises/interventions not performed today have been marked as deferred on flowsheets.     Pt Education:  HEP review  Exercise rationale/ pain free exercise performance  Posture/Postural awareness  Alternate exercise positions  Verbal/Tactile cues to ensure correct exercise performance/technique    Assessment & Plan       Assessment  Assessment details: Tolerated continued progression of therapeutic exercise/therapeutic activity/NMR well today, adding resistance to prone exercises and sideling ABD and ER.  No increased pain reported during or after session.   Reviewed goals today, making progress toward all STG.   Would continue to benefit from skilled PT progressing with functional AROM and strength (B) UE.     Goals  Plan Goals:  SHORT TERM GOALS: 4 weeks  1. Patient to be compliant with HEP and no difficulty with sleeping  2. Increased (B) UE strength to 4/5 with no pain> 3/10 to allow for household and work activities.   3. Pt to exhibit (B) shoulder active flexion / ABD to 140° in standing/sitting to assist with reaching overhead with less pain to wash hair and change shirt.  4. Pt demonstrates improved posture in sitting and standing for 30 mins with minimal-no cues during treatment session to help decrease  the stress on the shoulders.          Progress per Plan of Care and Progress strengthening /stabilization /functional activity, progress therapeutic exercise/HEP per pt. tolerance.       Timed:         Manual Therapy:         mins  96143     Therapeutic Exercise:     30    mins  72818     Neuromuscular David:    10    mins  26716    Therapeutic Activity:          mins  49596     Gait Training:           mins  08490     Ultrasound:          mins  77036    Ionto                                   mins  83071  Self Care                            mins  63761    Un-Timed:  Electrical Stimulation:         mins 48990 ( )  Traction          mins 01680    Timed Treatment:   40   mins   Total Treatment:     52   mins    BABAK Mooney License #[id number]  Physical Therapist Assistant

## 2024-07-18 ENCOUNTER — TREATMENT (OUTPATIENT)
Dept: PHYSICAL THERAPY | Facility: CLINIC | Age: 84
End: 2024-07-18
Payer: MEDICARE

## 2024-07-18 DIAGNOSIS — M25.611 DECREASED RANGE OF MOTION OF RIGHT SHOULDER: ICD-10-CM

## 2024-07-18 DIAGNOSIS — R29.898 DECREASED STRENGTH OF UPPER EXTREMITY: Primary | ICD-10-CM

## 2024-07-18 NOTE — PROGRESS NOTES
Physical Therapy Daily Treatment Note    [facility] Physical Therapy [place]  [address], [address]  [place] [address]    Visit # 5        Patient: Loulou Collier   : 1940  Referring practitioner: Paulina Sanderson MD  Date of Initial Evaluation:  Type: THERAPY  Noted: 7/3/2024  Today's Date: 2024           ICD-10-CM ICD-9-CM   1. Decreased strength of upper extremity  R29.898 729.89   2. Decreased range of motion of right shoulder  M25.611 719.51       Subjective  Loulou Collier reports:   I am feeling good today.    No new complaints or symptoms to report today vs last PT visit.    Objective   See Exercise, Manual, and Modality Logs for complete treatment   Note: any exercises/interventions not performed today have been marked as deferred on flowsheets.     Pt Education:  HEP review  Exercise rationale/ pain free exercise performance  Posture/Postural awareness  Alternate exercise positions  Verbal/Tactile cues to ensure correct exercise performance/technique    Assessment & Plan       Assessment  Assessment details: Tolerated continued progression of therapeutic exercise/therapeutic activity/NMR well today, with additional exercise from seated position to focus on postural mm today.  No increased pain reported during or after session.   Reviewed goals today, making progress toward all STG.   Would continue to benefit from skilled PT progressing with functional AROM and strength (B) UE.     Goals  Plan Goals:  SHORT TERM GOALS: 4 weeks  1. Patient to be compliant with HEP and no difficulty with sleeping  2. Increased (B) UE strength to 4/5 with no pain> 3/10 to allow for household and work activities.   3. Pt to exhibit (B) shoulder active flexion / ABD to 140° in standing/sitting to assist with reaching overhead with less pain to wash hair and change shirt.  4. Pt demonstrates improved posture in sitting and standing for 30 mins with minimal-no cues during treatment session  to help decrease the stress on the shoulders.          Progress per Plan of Care and Progress strengthening /stabilization /functional activity, progress therapeutic exercise/HEP per pt. tolerance.       Timed:         Manual Therapy:         mins  24708     Therapeutic Exercise:     30    mins  38536     Neuromuscular David:    10    mins  01634    Therapeutic Activity:          mins  07832     Gait Training:           mins  70658     Ultrasound:          mins  82081    Ionto                                   mins  29129  Self Care                            mins  82249    Un-Timed:  Electrical Stimulation:         mins 68848 ( )  Traction          mins 28150    Timed Treatment:   40   mins   Total Treatment:     55   mins    BABAK Mooney License #[id number]  Physical Therapist Assistant

## 2024-07-23 ENCOUNTER — TREATMENT (OUTPATIENT)
Dept: PHYSICAL THERAPY | Facility: CLINIC | Age: 84
End: 2024-07-23
Payer: MEDICARE

## 2024-07-23 DIAGNOSIS — M25.611 DECREASED RANGE OF MOTION OF RIGHT SHOULDER: ICD-10-CM

## 2024-07-23 DIAGNOSIS — R29.898 DECREASED STRENGTH OF UPPER EXTREMITY: Primary | ICD-10-CM

## 2024-07-23 NOTE — PROGRESS NOTES
Physical Therapy Daily Treatment Note    [facility] Physical Therapy [place]  [address], [address]  [place] [address]    Visit # 6        Patient: Loulou Collier   : 1940  Referring practitioner: Paulina Sanderson MD  Date of Initial Evaluation:  Type: THERAPY  Noted: 7/3/2024  Today's Date: 2024           ICD-10-CM ICD-9-CM   1. Decreased strength of upper extremity  R29.898 729.89   2. Decreased range of motion of right shoulder  M25.611 719.51       Subjective  Loulou Collier reports:   No c/o over the past several days, feeling mild tightness in shoulder but feeling ready to work on getting stronger.     Objective   See Exercise, Manual, and Modality Logs for complete treatment   Note: any exercises/interventions not performed today have been marked as deferred on flowsheets.     Pt Education:  HEP review  Exercise rationale/ pain free exercise performance  Posture/Postural awareness  Alternate exercise positions  Verbal/Tactile cues to ensure correct exercise performance/technique    Assessment & Plan       Assessment  Assessment details: Tolerated continued progression of therapeutic exercise/therapeutic activity/NMR well today, with progression of resisted exercise for strengthening.    Reviewed goals today, making progress toward all STG.   Would continue to benefit from skilled PT progressing with functional AROM and strength (B) UE.     Goals  Plan Goals:  SHORT TERM GOALS: 4 weeks  1. Patient to be compliant with HEP and no difficulty with sleeping  2. Increased (B) UE strength to 4/5 with no pain> 3/10 to allow for household and work activities.   3. Pt to exhibit (B) shoulder active flexion / ABD to 140° in standing/sitting to assist with reaching overhead with less pain to wash hair and change shirt.  4. Pt demonstrates improved posture in sitting and standing for 30 mins with minimal-no cues during treatment session to help decrease the stress on the shoulders.           Progress per Plan of Care and Progress strengthening /stabilization /functional activity, progress therapeutic exercise/HEP per pt. tolerance.       Timed:         Manual Therapy:         mins  17138     Therapeutic Exercise:     27    mins  60151     Neuromuscular David:    10    mins  19505    Therapeutic Activity:      8    mins  48633     Gait Training:           mins  18368     Ultrasound:          mins  55601    Ionto                                   mins  43706  Self Care                            mins  70562    Un-Timed:  Electrical Stimulation:         mins 78487 ( )  Traction          mins 91733    Timed Treatment:   45   mins   Total Treatment:     45   mins    BABAK Mooney License #[id number]  Physical Therapist Assistant

## 2024-07-26 ENCOUNTER — TREATMENT (OUTPATIENT)
Dept: PHYSICAL THERAPY | Facility: CLINIC | Age: 84
End: 2024-07-26
Payer: MEDICARE

## 2024-07-26 DIAGNOSIS — R29.898 DECREASED STRENGTH OF UPPER EXTREMITY: Primary | ICD-10-CM

## 2024-07-26 DIAGNOSIS — M25.611 DECREASED RANGE OF MOTION OF RIGHT SHOULDER: ICD-10-CM

## 2024-07-26 NOTE — PROGRESS NOTES
Physical Therapy Daily Treatment Note  [facility] Physical Therapy [place]  [address], [address]  [place] [id number]     Patient: Loulou Collier  : 1940  Referring practitioner: Paulina Sanderson MD  Today's Date: 2024    VISIT#: 7    Visit Diagnoses:    ICD-10-CM ICD-9-CM   1. Decreased strength of upper extremity  R29.898 729.89   2. Decreased range of motion of right shoulder  M25.611 719.51       Subjective   Loulou Collier reports: Patient reports that she is doing well, she is not having sharp pain in her shoulders anymore but continues to have a dull achy pain with OH movements.      Objective       See Exercise, Manual, and Modality Logs for complete treatment.     Patient Education: HEP review  Exercise rationale/ pain free exercise performance  Alternate exercise positions  Verbal/Tactile cues to ensure correct exercise performance/technique       Assessment/Plan  Patient demonstrates good tolerance to continued and new therapeutic exercises. Added a sitting upper trap stretch to exercise program with report of targeting the achy pain. Continued with strengthening and stabilization of B shoulders and surrounding scapular musculature. Requires minimal verbal cues throughout for proper positioning. Will continue to benefit from skilled PT interventions.       Progress per Plan of Care and Progress strengthening /stabilization /functional activity          Timed:         Manual Therapy:    5     mins  73099;     Therapeutic Exercise:    10     mins  42061;     Neuromuscular David:    15    mins  22990;    Therapeutic Activity:     -     mins  70774;     Gait Training:           mins  50027;     Ultrasound:          mins  87796;    Ionto:                                   mins  50161  Self Care:                       -     mins  60232    Un-Timed:  Electrical Stimulation:         mins  07843 ( );  Dry Needling          mins self-pay  Traction          mins  99105  Re-Eval                               mins  02446  Group Therapy           ___ mins 34520    Timed Treatment:   30   mins   Total Treatment:     59   mins    Jessica Sauer PT  Physical Therapist  [place] HEREDIA license #: [id number]

## 2024-07-29 ENCOUNTER — TREATMENT (OUTPATIENT)
Dept: PHYSICAL THERAPY | Facility: CLINIC | Age: 84
End: 2024-07-29
Payer: MEDICARE

## 2024-07-29 DIAGNOSIS — M25.611 DECREASED RANGE OF MOTION OF RIGHT SHOULDER: ICD-10-CM

## 2024-07-29 DIAGNOSIS — R29.898 DECREASED STRENGTH OF UPPER EXTREMITY: Primary | ICD-10-CM

## 2024-07-29 PROCEDURE — 97110 THERAPEUTIC EXERCISES: CPT

## 2024-07-29 PROCEDURE — 97140 MANUAL THERAPY 1/> REGIONS: CPT

## 2024-07-29 PROCEDURE — 97112 NEUROMUSCULAR REEDUCATION: CPT

## 2024-07-29 NOTE — PROGRESS NOTES
"Physical Therapy Daily Treatment Note  [facility] Therapy  [place] [address]     [place] [address]   Phone: [phone]   Fax: [phone]      Patient: Loulou Collier   : 1940  Referring practitioner: Paulina Sanderson MD  Date of Initial Visit: Type: THERAPY  Noted: 7/3/2024  Today's Date: 2024  Patient seen for 8 sessions       Visit Diagnoses:    ICD-10-CM ICD-9-CM   1. Decreased strength of upper extremity  R29.898 729.89   2. Decreased range of motion of right shoulder  M25.611 719.51         Subjective:  Loulou Collier feels her R shoulder is getting better with physical therapy. Reports she continues to have greatest difficulty with lifting heavy things.       Objective   See Exercise, Manual, and Modality Logs for complete treatment.       Assessment:  Patient tolerates today's movement interventions well without increase in R shoulder pain. Patient's R shoulder ROM appears to be improved with therapist provided PROM compared to IE measurements, although not formally measured today. Patient with some cues for completion of today's exercises, and reports mild fatigue following completion. Patient with mild discomfort described as \"pinchy\" with end range flexion. Patient will continue to benefit from physical therapy for benefits in R shoulder strength and mobility.      Plan:   Continue to address R shoulder mobility and strength. Continue with postural education as well.        Timed:         Manual Therapy:    10     mins  03170;     Therapeutic Exercise:    15     mins  00899;     Neuromuscular David:    15    mins  76750;    Therapeutic Activity:          mins  77674;     Gait Training:           mins  92776;     Ultrasound:          mins  96246;    Ionto                                   mins  48532  Self Care                            mins  21476  Traction          mins 81084      Un-Timed:  Canalith Repos         mins 24216  Electrical " Stimulation:         mins  00063 ( );  Dry Needling          mins self-pay  Traction          mins 29642        Timed Treatment:   40   mins   Total Treatment:     40   mins    Flaco Tyson PT  License Number: [place] [id number]# [id number]. [place] LIC# [id number]    Physical Therapist

## 2024-07-31 ENCOUNTER — TREATMENT (OUTPATIENT)
Dept: PHYSICAL THERAPY | Facility: CLINIC | Age: 84
End: 2024-07-31
Payer: MEDICARE

## 2024-07-31 DIAGNOSIS — M25.611 DECREASED RANGE OF MOTION OF RIGHT SHOULDER: ICD-10-CM

## 2024-07-31 DIAGNOSIS — R29.898 DECREASED STRENGTH OF UPPER EXTREMITY: Primary | ICD-10-CM

## 2024-07-31 NOTE — LETTER
Physical Therapy Daily Treatment and Progress Note  [facility] Physical Therapy [place]  [address]  [place] [id number]     Patient: Loulou Collier  : 1940  Referring practitioner: Paulina Sanderson MD  Today's Date: 2024    VISIT#: 9    Visit Diagnoses:    ICD-10-CM ICD-9-CM   1. Decreased strength of upper extremity  R29.898 729.89   2. Decreased range of motion of right shoulder  M25.611 719.51     Quick DASH: 37.5-18.18  Subjective Evaluation    Pain  Current pain rating: 3  At best pain ratin  At worst pain rating: 3         Loulou Collier reports: Patient reports that she has seen about a 75%-80% improvement thus far. It is much better than when she started but she still feels some pain and ache in the back of her shoulder.       Objective   Active Range of Motion   Left Shoulder   Flexion: 150 degrees with pain  Abduction: 135 degrees with pain  External rotation 0°: 70 degrees      Right Shoulder   Flexion: 120 degrees with pain (155 degrees , with a slight pulling sensation)  Abduction: 130 degrees (150 degrees with a pulling sensation)  External rotation 0°: 60 degrees with pain (75 degrees)       Strength/Myotome Testing      Left Shoulder      Planes of Motion   Flexion: 4 (4+)  Abduction: 4 (4+)  External rotation at 0°: 4 (4+)  Internal rotation at 0°: 4 (4+)     Right Shoulder      Planes of Motion   Flexion: 4- (4)  Abduction: 4 (4)  External rotation at 0°: 4 (4)  Internal rotation at 0°: 4 (4)       See Exercise, Manual, and Modality Logs for complete treatment.     Patient Education: HEP review  Exercise rationale/ pain free exercise performance  Alternate exercise positions  Verbal/Tactile cues to ensure correct exercise performance/technique     Assessment/Plan  Patient has demonstrated good tolerance and great progress thus far with skilled PT interventions. Subjectively, pain is better controlled, less severe and less often. Her Quick DASH  score improved from a 37.5-18.18% demonstrating an improvement in perceived ability. Objectively, her strength and ROM has improved but she continues to have strength deficits.Patient reports that she is sleeping better and can carry out ADLs better with less pain but with lifting and end range she still feels a slight pulling in her right shoulder. She has met 3/4 STGs and 1/4 LTGs and is progressing well towards the others. She will benefit from continued skilled PT interventions to target the remaining deficits.       Goals  Plan Goals: SHORT TERM GOALS: 4 weeks  1. Patient to be compliant with HEP and no difficulty with sleeping (MET)  2. Increased (B) UE strength to 4/5 with no pain> 3/10 to allow for household and work activities. (MET)  3. Pt to exhibit (B) shoulder active flexion / ABD to 140° in standing/sitting to assist with reaching overhead with less pain to wash hair and change shirt. (MET)  4. Pt demonstrates improved posture in sitting and standing for 30 mins with minimal-no cues during treatment session to help decrease the stress on the shoulders.  (Progressing)     LONG TERM GOALS: 8 weeks  1. Pt score <20% perceived disability on Quick DASH. (MET)  2. Pt. to exhibit (B) shoulder AROM to WFL (> 165°) flex/abd. to allow for reaching overhead and behind back without pain limiting function to perform dressing and reach higher shelves. (Progressing)  3. Pt to exhibit 4+/5 UE strength to allow for pushing/pulling and lifting >5 #to occur with pain <2/10 to help with household cleaning, grocery shopping and recreational activities. (Progressing)  4. Pt able to reach overhead and lift 10# (B) x 10 to allow for return to doing work around home and help improve ability to return to exercise without restriction.  (Progressing)       Progress per Plan of Care and Progress strengthening /stabilization /functional activity      Jessica Sauer, PT  Physical Therapist  [place] [facility] license #: [id number]

## 2024-07-31 NOTE — PROGRESS NOTES
Physical Therapy Daily Treatment and Progress Note  [facility] Physical Therapy [place]  [address], [address]  [place] [id number]     Patient: Loulou Collier  : 1940  Referring practitioner: Paulina Sanderson MD  Today's Date: 2024    VISIT#: 9    Visit Diagnoses:    ICD-10-CM ICD-9-CM   1. Decreased strength of upper extremity  R29.898 729.89   2. Decreased range of motion of right shoulder  M25.611 719.51     Quick DASH: 37.5-18.18  Subjective Evaluation    Pain  Current pain rating: 3  At best pain ratin  At worst pain rating: 3         Loulou Collier reports: Patient reports that she has seen about a 75%-80% improvement thus far. It is much better than when she started but she still feels some pain and ache in the back of her shoulder.       Objective   Active Range of Motion   Left Shoulder   Flexion: 150 degrees with pain  Abduction: 135 degrees with pain  External rotation 0°: 70 degrees      Right Shoulder   Flexion: 120 degrees with pain (155 degrees , with a slight pulling sensation)  Abduction: 130 degrees (150 degrees with a pulling sensation)  External rotation 0°: 60 degrees with pain (75 degrees)       Strength/Myotome Testing      Left Shoulder      Planes of Motion   Flexion: 4 (4+)  Abduction: 4 (4+)  External rotation at 0°: 4 (4+)  Internal rotation at 0°: 4 (4+)     Right Shoulder      Planes of Motion   Flexion: 4- (4)  Abduction: 4 (4)  External rotation at 0°: 4 (4)  Internal rotation at 0°: 4 (4)       See Exercise, Manual, and Modality Logs for complete treatment.     Patient Education: HEP review  Exercise rationale/ pain free exercise performance  Alternate exercise positions  Verbal/Tactile cues to ensure correct exercise performance/technique     Assessment/Plan  Patient has demonstrated good tolerance and great progress thus far with skilled PT interventions. Subjectively, pain is better controlled, less severe and less often. Her Quick DASH  score improved from a 37.5-18.18% demonstrating an improvement in perceived ability. Objectively, her strength and ROM has improved but she continues to have strength deficits.Patient reports that she is sleeping better and can carry out ADLs better with less pain but with lifting and end range she still feels a slight pulling in her right shoulder. She has met 3/4 STGs and 1/4 LTGs and is progressing well towards the others. She will benefit from continued skilled PT interventions to target the remaining deficits.       Goals  Plan Goals: SHORT TERM GOALS: 4 weeks  1. Patient to be compliant with HEP and no difficulty with sleeping (MET)  2. Increased (B) UE strength to 4/5 with no pain> 3/10 to allow for household and work activities. (MET)  3. Pt to exhibit (B) shoulder active flexion / ABD to 140° in standing/sitting to assist with reaching overhead with less pain to wash hair and change shirt. (MET)  4. Pt demonstrates improved posture in sitting and standing for 30 mins with minimal-no cues during treatment session to help decrease the stress on the shoulders.  (Progressing)     LONG TERM GOALS: 8 weeks  1. Pt score <20% perceived disability on Quick DASH. (MET)  2. Pt. to exhibit (B) shoulder AROM to WFL (> 165°) flex/abd. to allow for reaching overhead and behind back without pain limiting function to perform dressing and reach higher shelves. (Progressing)  3. Pt to exhibit 4+/5 UE strength to allow for pushing/pulling and lifting >5 #to occur with pain <2/10 to help with household cleaning, grocery shopping and recreational activities. (Progressing)  4. Pt able to reach overhead and lift 10# (B) x 10 to allow for return to doing work around home and help improve ability to return to exercise without restriction.  (Progressing)       Progress per Plan of Care and Progress strengthening /stabilization /functional activity          Timed:         Manual Therapy:    -     mins  51065;     Therapeutic  Exercise:   25     mins  58119;     Neuromuscular David:    15    mins  49093;    Therapeutic Activity:     13     mins  04225;     Gait Training:           mins  09607;     Ultrasound:          mins  32018;    Ionto:                                   mins  17696  Self Care:                       -     mins  93593    Un-Timed:  Electrical Stimulation:         mins  37570 ( );  Dry Needling          mins self-pay  Traction          mins 56934  Re-Eval                               mins  89473  Group Therapy           ___ mins 22039    Timed Treatment:   53   mins   Total Treatment:     58   mins    Jessica Sauer PT  Physical Therapist  [place] [facility] license #: [id number]

## 2024-08-07 ENCOUNTER — TREATMENT (OUTPATIENT)
Dept: PHYSICAL THERAPY | Facility: CLINIC | Age: 84
End: 2024-08-07
Payer: MEDICARE

## 2024-08-07 DIAGNOSIS — R29.898 DECREASED STRENGTH OF UPPER EXTREMITY: Primary | ICD-10-CM

## 2024-08-07 DIAGNOSIS — M25.611 DECREASED RANGE OF MOTION OF RIGHT SHOULDER: ICD-10-CM

## 2024-08-07 NOTE — PROGRESS NOTES
Physical Therapy Daily Treatment Note  [facility] Physical Therapy [place]  [address], [address]  [place] [id number]     Patient: Loulou Collier  : 1940  Referring practitioner: Paulina Sanderson MD  Today's Date: 2024    VISIT#: 10    Visit Diagnoses:    ICD-10-CM ICD-9-CM   1. Decreased strength of upper extremity  R29.898 729.89   2. Decreased range of motion of right shoulder  M25.611 719.51       Subjective   Loulou Collier reports: Patient reports that her shoulders are doing much better.       Objective       See Exercise, Manual, and Modality Logs for complete treatment.     Patient Education: HEP review  Exercise rationale/ pain free exercise performance  Alternate exercise positions  Verbal/Tactile cues to ensure correct exercise performance/technique       Assessment/Plan  Patient continues to demonstrate good tolerance to progress exercise program. Continued with shoulder stabilization exercises, requiring minimal cueing to avoid protraction of the shoulder throughout. Continued with supine exercises and standing functional tasks. With supine ABCs patient's shoulder continued to go into shoulder extension, requiring verbal cues for proper positioning. Patient reports general UE fatigue throughout but no report of increased pain. Will continue to progress as tolerated.      Progress per Plan of Care and Progress strengthening /stabilization /functional activity          Timed:         Manual Therapy:    -     mins  59588;     Therapeutic Exercise:    15     mins  81819;     Neuromuscular David:   15    mins  99191;    Therapeutic Activity:     -     mins  10229;     Gait Training:           mins  83276;     Ultrasound:          mins  81473;    Ionto:                                   mins  58496  Self Care:                       -     mins  37431    Un-Timed:  Electrical Stimulation:         mins  65740 ( );  Dry Needling          mins self-pay  Traction           mins 78652  Re-Eval                               mins  45081  Group Therapy           ___ mins 49406    Timed Treatment:   30   mins   Total Treatment:     54   mins    Jessica Sauer PT  Physical Therapist  [place] HERED[place] license #: [id number]

## 2024-08-13 ENCOUNTER — TREATMENT (OUTPATIENT)
Dept: PHYSICAL THERAPY | Facility: CLINIC | Age: 84
End: 2024-08-13
Payer: MEDICARE

## 2024-08-13 DIAGNOSIS — R29.898 DECREASED STRENGTH OF UPPER EXTREMITY: Primary | ICD-10-CM

## 2024-08-13 DIAGNOSIS — M25.611 DECREASED RANGE OF MOTION OF RIGHT SHOULDER: ICD-10-CM

## 2024-08-13 PROCEDURE — 97530 THERAPEUTIC ACTIVITIES: CPT

## 2024-08-13 PROCEDURE — 97112 NEUROMUSCULAR REEDUCATION: CPT

## 2024-08-13 NOTE — PROGRESS NOTES
Physical Therapy Daily Treatment Note  [facility] Physical Therapy [place]  [address], [address]  [place] [address]     Patient: Loulou Collier  : 1940  Referring practitioner: Paulina Sanderson MD  Today's Date: 2024    VISIT#: 11    Visit Diagnoses:    ICD-10-CM ICD-9-CM   1. Decreased strength of upper extremity  R29.898 729.89   2. Decreased range of motion of right shoulder  M25.611 719.51       Subjective   Loulou Collier reports:   Doing well today, no specific pain of note at onset of today's session.  Reports feeling more aware of her posture throughout the day.      Objective   See Exercise, Manual, and Modality Logs for complete treatment.     Patient Education:   HEP review  Exercise rationale/ pain free exercise performance  Alternate exercise positions  Verbal/Tactile cues to ensure correct exercise performance/technique       Assessment & Plan       Assessment  Assessment details: Patient continues to demonstrate good tolerance to program progression, working on more functional shelf lifts today with good tolerance to lifting with each UE and (B) UE together, able to maintain good posture throughout.   Making progress toward LTG #2, 3, 4.    Goals  Plan Goals:  4 weeks  1. Patient to be compliant with HEP and no difficulty with sleeping (MET)  2. Increased (B) UE strength to 4/5 with no pain> 3/10 to allow for household and work activities. (MET)  3. Pt to exhibit (B) shoulder active flexion / ABD to 140° in standing/sitting to assist with reaching overhead with less pain to wash hair and change shirt. (MET)  4. Pt demonstrates improved posture in sitting and standing for 30 mins with minimal-no cues during treatment session to help decrease the stress on the shoulders.  (Progressing)     LONG TERM GOALS: 8 weeks  1. Pt score <20% perceived disability on Quick DASH. (MET)  2. Pt. to exhibit (B) shoulder AROM to WFL (> 165°) flex/abd. to allow for reaching overhead  and behind back without pain limiting function to perform dressing and reach higher shelves. (Progressing)  3. Pt to exhibit 4+/5 UE strength to allow for pushing/pulling and lifting >5 #to occur with pain <2/10 to help with household cleaning, grocery shopping and recreational activities. (Progressing)  4. Pt able to reach overhead and lift 10# (B) x 10 to allow for return to doing work around home and help improve ability to return to exercise without restriction.  (Progressing)                 Progress per Plan of Care and Progress strengthening /stabilization /functional activity          Timed:         Manual Therapy:    -     mins  37630;     Therapeutic Exercise:         mins  17349;     Neuromuscular David:   15    mins  04802;    Therapeutic Activity:     15     mins  79373;     Gait Training:           mins  75306;     Ultrasound:          mins  92485;    Ionto:                                   mins  97002  Self Care:                       -     mins  83126    Un-Timed:  Electrical Stimulation:         mins  55816 ( );  Dry Needling          mins self-pay  Traction          mins 23013  Re-Eval                               mins  26572  Group Therapy           ___ mins 05615    Timed Treatment:   30   mins   Total Treatment:     45   mins    BABAK Mooney License #[id number]  Physical Therapist Assistant

## 2024-08-14 ENCOUNTER — HOSPITAL ENCOUNTER (OUTPATIENT)
Dept: BONE DENSITY | Facility: HOSPITAL | Age: 84
Discharge: HOME | End: 2024-08-14
Admitting: FAMILY MEDICINE
Payer: MEDICARE

## 2024-08-14 DIAGNOSIS — M81.6 LOCALIZED OSTEOPOROSIS (LEQUESNE): ICD-10-CM

## 2024-08-14 DIAGNOSIS — Z13.820 OSTEOPOROSIS SCREENING: ICD-10-CM

## 2024-08-14 PROCEDURE — 77080 DXA BONE DENSITY AXIAL: CPT

## 2024-08-21 ENCOUNTER — TREATMENT (OUTPATIENT)
Dept: PHYSICAL THERAPY | Facility: CLINIC | Age: 84
End: 2024-08-21
Payer: MEDICARE

## 2024-08-21 DIAGNOSIS — M25.611 DECREASED RANGE OF MOTION OF RIGHT SHOULDER: ICD-10-CM

## 2024-08-21 DIAGNOSIS — R29.898 DECREASED STRENGTH OF UPPER EXTREMITY: Primary | ICD-10-CM

## 2024-08-21 PROCEDURE — 97530 THERAPEUTIC ACTIVITIES: CPT

## 2024-08-21 PROCEDURE — 97112 NEUROMUSCULAR REEDUCATION: CPT

## 2024-08-21 NOTE — PROGRESS NOTES
Physical Therapy Daily Treatment Note  [facility] Physical Therapy [place]  [address], [address]  [place] [address]     Patient: Loulou Collier  : 1940  Referring practitioner: Paulina Sanderson MD  Today's Date: 2024    VISIT#: 12    Visit Diagnoses:    ICD-10-CM ICD-9-CM   1. Decreased strength of upper extremity  R29.898 729.89   2. Decreased range of motion of right shoulder  M25.611 719.51       Subjective   Loulou Collier reports: Patient reports that her shoulders are feeling better. I'm still not doing any heavy lifting but I'm seeing improvement.       Objective       See Exercise, Manual, and Modality Logs for complete treatment.     Patient Education: HEP review  Exercise rationale/ pain free exercise performance  Alternate exercise positions  Verbal/Tactile cues to ensure correct exercise performance/technique       Assessment/Plan  Patient continues to demonstrate good tolerance to therapeutic exercises on this date with no report of R shoulder pain reported throughout or at the end of the session. She is continuing to progress well with general shoulder fatigue reported throughout. Will continue to progress as tolerated.       Progress per Plan of Care and Progress strengthening /stabilization /functional activity          Timed:         Manual Therapy:    -     mins  35907;     Therapeutic Exercise:    5     mins  24063;     Neuromuscular David:    15    mins  89200;    Therapeutic Activity:     10     mins  29892;     Gait Training:           mins  00004;     Ultrasound:          mins  95987;    Ionto:                                   mins  32666  Self Care:                       -     mins  42963    Un-Timed:  Electrical Stimulation:         mins  18443 ( );  Dry Needling          mins self-pay  Traction          mins 80795  Re-Eval                               mins  54849  Group Therapy           ___ mins 99175    Timed Treatment:   30   mins   Total  Treatment:     52   mins    Jessica Sauer PT  Physical Therapist  [employer] license #: [id number]

## 2024-08-23 ENCOUNTER — HOSPITAL ENCOUNTER (OUTPATIENT)
Dept: MAMMOGRAPHY | Facility: HOSPITAL | Age: 84
Discharge: HOME | End: 2024-08-23
Admitting: FAMILY MEDICINE
Payer: MEDICARE

## 2024-08-23 DIAGNOSIS — Z12.31 SCREENING MAMMOGRAM FOR BREAST CANCER: ICD-10-CM

## 2024-08-23 PROCEDURE — 77063 BREAST TOMOSYNTHESIS BI: CPT

## 2024-08-23 PROCEDURE — 77067 SCR MAMMO BI INCL CAD: CPT

## 2024-08-28 ENCOUNTER — TREATMENT (OUTPATIENT)
Dept: PHYSICAL THERAPY | Facility: CLINIC | Age: 84
End: 2024-08-28
Payer: MEDICARE

## 2024-08-28 DIAGNOSIS — M25.611 DECREASED RANGE OF MOTION OF RIGHT SHOULDER: ICD-10-CM

## 2024-08-28 DIAGNOSIS — R29.898 DECREASED STRENGTH OF UPPER EXTREMITY: Primary | ICD-10-CM

## 2024-08-28 PROCEDURE — 97530 THERAPEUTIC ACTIVITIES: CPT

## 2024-08-28 PROCEDURE — 97112 NEUROMUSCULAR REEDUCATION: CPT

## 2024-08-28 NOTE — PROGRESS NOTES
Physical Therapy Daily Treatment Note  [facility] Physical Therapy [place]  [address], [address]  [place] [address]     Patient: Loulou Collier  : 1940  Referring practitioner: Paulina Sanderson MD  Today's Date: 2024    VISIT#: 13    Visit Diagnoses:    ICD-10-CM ICD-9-CM   1. Decreased strength of upper extremity  R29.898 729.89   2. Decreased range of motion of right shoulder  M25.611 719.51       Subjective   Loulou Collier reports: Patient reports she is doing well, although she tried to carry a basket upstairs and this seemed to bother the shoulder. She took some Tylenol and the pain subsided. Right shoulder continues to be the biggest problem, left shoulder is doing pretty good now.       Objective       See Exercise, Manual, and Modality Logs for complete treatment.     Patient Education: HEP review  Exercise rationale/ pain free exercise performance  Alternate exercise positions  Verbal/Tactile cues to ensure correct exercise performance/technique       Assessment/Plan  Patient demonstrates good tolerance to continued and new therapeutic interventions and exercises. Added suitcase exercise with KB. She demonstrates good posture with verbal cues to stand straight and not to hike the right shoulder. Mimicked carrying laundry upstairs with a 10 lb weight, discussed trying these two exercises at home with a milk carton and how the more she practices these specific tasks with weight the easier and less stress it will be on the shoulder. Still discussed working in a pain free ROM. Progressed to standing exercises vs supine and side-lying. Minimal discomfort reported in the right shoulder with standing abduction, but discussed performing this exercise in a pain free ROM. Patient will return in about 2 weeks and will progress as tolerated, will likely DC if she is still doing well.       Progress per Plan of Care and Progress strengthening /stabilization /functional  activity          Timed:         Manual Therapy:    -     mins  72157;     Therapeutic Exercise:    -     mins  15421;     Neuromuscular David:    18    mins  83560;    Therapeutic Activity:     32     mins  93503;     Gait Training:           mins  77654;     Ultrasound:          mins  02562;    Ionto:                                   mins  75053  Self Care:                       -     mins  41319    Un-Timed:  Electrical Stimulation:         mins  23057 ( );  Dry Needling          mins self-pay  Traction          mins 61555  Re-Eval                               mins  57629  Group Therapy           ___ mins 51641    Timed Treatment:   50   mins   Total Treatment:     50   mins    Jessica Sauer PT  Physical Therapist  [employer] license #: [id number]

## 2024-09-11 ENCOUNTER — TREATMENT (OUTPATIENT)
Dept: PHYSICAL THERAPY | Facility: CLINIC | Age: 84
End: 2024-09-11
Payer: MEDICARE

## 2024-09-11 DIAGNOSIS — M25.611 DECREASED RANGE OF MOTION OF RIGHT SHOULDER: ICD-10-CM

## 2024-09-11 DIAGNOSIS — R29.898 DECREASED STRENGTH OF UPPER EXTREMITY: Primary | ICD-10-CM

## 2024-09-11 PROCEDURE — 97112 NEUROMUSCULAR REEDUCATION: CPT

## 2024-09-11 PROCEDURE — 97530 THERAPEUTIC ACTIVITIES: CPT

## 2024-09-11 NOTE — LETTER
Physical Therapy Daily Treatment and DC Note  [facility] Physical Therapy [place]  [address], [address]  [place] [address]     Patient: Loulou Collier  : 1940  Referring practitioner: Paulina Sanderson MD  Today's Date: 2024    VISIT#: 14    Visit Diagnoses:    ICD-10-CM ICD-9-CM   1. Decreased strength of upper extremity  R29.898 729.89   2. Decreased range of motion of right shoulder  M25.611 719.51     Quick DASH: 37.5-18.18- 13.64  Subjective Evaluation    Pain  Current pain ratin  At best pain ratin  At worst pain ratin         Loulou Collier reports: Patient reports that she is doing much better than when she began. She reports that pain levels are controlled much better, and only experiences the discomfort occasionally. She has seen about a 90% improvement since day one and is ready for DC.       Objective   Right Shoulder   Flexion: 120 degrees with pain (155 degrees , with a slight pulling sensation, 150 degrees on  with slight pull)  Abduction: 130 degrees (150 degrees with a pulling sensation, 155 degrees with some radiating pain)  External rotation 0°: 60 degrees with pain (75 degrees, 70 degrees with no pain)       See Exercise, Manual, and Modality Logs for complete treatment.     Patient Education: HEP review  Exercise rationale/ pain free exercise performance  Alternate exercise positions  Verbal/Tactile cues to ensure correct exercise performance/technique       Assessment/Plan  Patient has demonstrated good tolerance and great progress thus far with skilled PT interventions. Subjectively, pain is better controlled, less severe and less often. Her Quick DASH score improved from a 18.18%- 13.64 demonstrating an improvement in perceived ability. Objectively, her strength and ROM has improved but she continues to have strength deficits.Patient reports that she is sleeping better and can carry out ADLs better with less pain but with lifting and end  range she still feels a slight pulling in her right shoulder. She has met 4/4 STGs and 2/4 LTGs and is progressing well towards the others. Answered any questions she had at the time and provided her with my contact information for any further questions.      Goals  Plan Goals: SHORT TERM GOALS: 4 weeks  1. Patient to be compliant with HEP and no difficulty with sleeping (MET)  2. Increased (B) UE strength to 4/5 with no pain> 3/10 to allow for household and work activities. (MET)  3. Pt to exhibit (B) shoulder active flexion / ABD to 140° in standing/sitting to assist with reaching overhead with less pain to wash hair and change shirt. (MET)  4. Pt demonstrates improved posture in sitting and standing for 30 mins with minimal-no cues during treatment session to help decrease the stress on the shoulders.  (MET)     LONG TERM GOALS: 8 weeks  1. Pt score <20% perceived disability on Quick DASH. (MET)  2. Pt. to exhibit (B) shoulder AROM to WFL (> 165°) flex/abd. to allow for reaching overhead and behind back without pain limiting function to perform dressing and reach higher shelves. (Progressing)  3. Pt to exhibit 4+/5 UE strength to allow for pushing/pulling and lifting >5 #to occur with pain <2/10 to help with household cleaning, grocery shopping and recreational activities. (MET)  4. Pt able to reach overhead and lift 10# (B) x 10 to allow for return to doing work around home and help improve ability to return to exercise without restriction.  (Progressing)      Jessica Sauer, PT  Physical Therapist  [place] [facility] license #: [id number]

## 2024-09-11 NOTE — PROGRESS NOTES
Physical Therapy Daily Treatment and DC Note  [facility] Physical Therapy [place]  [address], [address]  [place] [address]     Patient: Loulou Collier  : 1940  Referring practitioner: Paulina Sanderson MD  Today's Date: 2024    VISIT#: 14    Visit Diagnoses:    ICD-10-CM ICD-9-CM   1. Decreased strength of upper extremity  R29.898 729.89   2. Decreased range of motion of right shoulder  M25.611 719.51     Quick DASH: 37.5-18.18- 13.64  Subjective Evaluation    Pain  Current pain ratin  At best pain ratin  At worst pain ratin         Loulou Collier reports: Patient reports that she is doing much better than when she began. She reports that pain levels are controlled much better, and only experiences the discomfort occasionally. She has seen about a 90% improvement since day one and is ready for DC.       Objective   Right Shoulder   Flexion: 120 degrees with pain (155 degrees , with a slight pulling sensation, 150 degrees on  with slight pull)  Abduction: 130 degrees (150 degrees with a pulling sensation, 155 degrees with some radiating pain)  External rotation 0°: 60 degrees with pain (75 degrees, 70 degrees with no pain)       See Exercise, Manual, and Modality Logs for complete treatment.     Patient Education: HEP review  Exercise rationale/ pain free exercise performance  Alternate exercise positions  Verbal/Tactile cues to ensure correct exercise performance/technique       Assessment/Plan  Patient has demonstrated good tolerance and great progress thus far with skilled PT interventions. Subjectively, pain is better controlled, less severe and less often. Her Quick DASH score improved from a 18.18%- 13.64 demonstrating an improvement in perceived ability. Objectively, her strength and ROM has improved but she continues to have strength deficits.Patient reports that she is sleeping better and can carry out ADLs better with less pain but with lifting and end  range she still feels a slight pulling in her right shoulder. She has met 4/4 STGs and 2/4 LTGs and is progressing well towards the others. Answered any questions she had at the time and provided her with my contact information for any further questions.      Goals  Plan Goals: SHORT TERM GOALS: 4 weeks  1. Patient to be compliant with HEP and no difficulty with sleeping (MET)  2. Increased (B) UE strength to 4/5 with no pain> 3/10 to allow for household and work activities. (MET)  3. Pt to exhibit (B) shoulder active flexion / ABD to 140° in standing/sitting to assist with reaching overhead with less pain to wash hair and change shirt. (MET)  4. Pt demonstrates improved posture in sitting and standing for 30 mins with minimal-no cues during treatment session to help decrease the stress on the shoulders.  (MET)     LONG TERM GOALS: 8 weeks  1. Pt score <20% perceived disability on Quick DASH. (MET)  2. Pt. to exhibit (B) shoulder AROM to WFL (> 165°) flex/abd. to allow for reaching overhead and behind back without pain limiting function to perform dressing and reach higher shelves. (Progressing)  3. Pt to exhibit 4+/5 UE strength to allow for pushing/pulling and lifting >5 #to occur with pain <2/10 to help with household cleaning, grocery shopping and recreational activities. (MET)  4. Pt able to reach overhead and lift 10# (B) x 10 to allow for return to doing work around home and help improve ability to return to exercise without restriction.  (Progressing)         Other          Timed:         Manual Therapy:    -     mins  76958;     Therapeutic Exercise:    -     mins  16741;     Neuromuscular David:    10    mins  07594;    Therapeutic Activity:     18     mins  33662;     Gait Training:           mins  21866;     Ultrasound:          mins  30244;    Ionto:                                   mins  38585  Self Care:                       7     mins  57162    Un-Timed:  Electrical Stimulation:         mins  72331  ( );  Dry Needling          mins self-pay  Traction          mins 95593  Re-Eval                               mins  71048  Group Therapy           ___ mins 76336    Timed Treatment:   35   mins   Total Treatment:     42   mins    Jessica Sauer PT  Physical Therapist  [employer] license #: [id number]

## 2024-09-16 RX ORDER — ROSUVASTATIN CALCIUM 40 MG/1
40 TABLET, COATED ORAL DAILY
Qty: 90 TABLET | Refills: 3 | Status: SHIPPED | OUTPATIENT
Start: 2024-09-16

## 2024-10-03 ENCOUNTER — TELEPHONE (OUTPATIENT)
Dept: INTERNAL MEDICINE | Facility: CLINIC | Age: 84
End: 2024-10-03
Payer: MEDICARE

## 2024-10-03 NOTE — TELEPHONE ENCOUNTER
Called pt and got her voicemail, I left her a msg to reach out to her pharmacy for a refill on her Fexofenadine/Allegra

## 2024-10-03 NOTE — TELEPHONE ENCOUNTER
Caller: Loulou Collier    Relationship: Self    Best call back number: [phone]    What medication are you requesting: SINUS MEDICATION THAT PATIENT HAS BEEN PRESCRIBED BEFORE     What are your current symptoms: SNEEZING       Have you had these symptoms before:    [x] Yes  [] No    Have you been treated for these symptoms before:   [x] Yes  [] No    If a prescription is needed, what is your preferred pharmacy and phone number: [id number] [address] AT [facility] [phone] [facility] [phone] FX     Additional notes:  PATIENT DOESN'T KNOW THE NAME OF MEDICATION.  PLEASE CALL AND ADVISE

## 2024-10-24 ENCOUNTER — OFFICE VISIT (OUTPATIENT)
Dept: INTERNAL MEDICINE | Facility: CLINIC | Age: 84
End: 2024-10-24
Payer: MEDICARE

## 2024-10-24 VITALS
HEIGHT: 62 IN | WEIGHT: 208 LBS | DIASTOLIC BLOOD PRESSURE: 92 MMHG | SYSTOLIC BLOOD PRESSURE: 146 MMHG | BODY MASS INDEX: 38.28 KG/M2 | TEMPERATURE: 97.1 F | HEART RATE: 76 BPM | OXYGEN SATURATION: 100 %

## 2024-10-24 DIAGNOSIS — R35.0 URINARY FREQUENCY: ICD-10-CM

## 2024-10-24 DIAGNOSIS — N30.90 CYSTITIS: Primary | ICD-10-CM

## 2024-10-24 LAB
BILIRUB BLD-MCNC: NEGATIVE MG/DL
CLARITY, POC: ABNORMAL
COLOR UR: ABNORMAL
EXPIRATION DATE: ABNORMAL
GLUCOSE UR STRIP-MCNC: NEGATIVE MG/DL
KETONES UR QL: NEGATIVE
LEUKOCYTE EST, POC: ABNORMAL
Lab: ABNORMAL
NITRITE UR-MCNC: POSITIVE MG/ML
PH UR: 6 [PH] (ref 5–8)
PROT UR STRIP-MCNC: ABNORMAL MG/DL
RBC # UR STRIP: ABNORMAL /UL
SP GR UR: 1.03 (ref 1–1.03)
UROBILINOGEN UR QL: ABNORMAL

## 2024-10-24 PROCEDURE — 99214 OFFICE O/P EST MOD 30 MIN: CPT

## 2024-10-24 PROCEDURE — 3077F SYST BP >= 140 MM HG: CPT

## 2024-10-24 PROCEDURE — 3080F DIAST BP >= 90 MM HG: CPT

## 2024-10-24 PROCEDURE — 1125F AMNT PAIN NOTED PAIN PRSNT: CPT

## 2024-10-24 PROCEDURE — 1160F RVW MEDS BY RX/DR IN RCRD: CPT

## 2024-10-24 PROCEDURE — 1159F MED LIST DOCD IN RCRD: CPT

## 2024-10-24 PROCEDURE — 81003 URINALYSIS AUTO W/O SCOPE: CPT

## 2024-10-24 RX ORDER — CIPROFLOXACIN 250 MG/1
250 TABLET, FILM COATED ORAL 2 TIMES DAILY
Qty: 14 TABLET | Refills: 0 | Status: SHIPPED | OUTPATIENT
Start: 2024-10-24

## 2024-10-24 NOTE — PROGRESS NOTES
Chief Complaint  Urinary Frequency (Freq, pressure and painful after voiding x1 week, no odor, no back pain )     Subjective:      History of Present Illness {CC  Problem List  Visit  Diagnosis   Encounters  Notes  Medications  Labs  Result Review Imaging  Media :23}     Loulou Collier presents to [facility] for:  Urinary frequency    History of Present Illness   Loulou Collier is a patient of Abebe Mancilla MD and presents today with complaints of urinary frequency and dysuria. She denies odor and back pain. She states it may be because she has not been drinking enough water. She states the pain is while she is urinating. She denies back pain, no fevers or chills. She states her urine sometimes is a little more darker than usual      I have reviewed patient's medical history, any new submitted information provided by patient or medical assistant and updated medical record.      Objective:      Physical Exam  Vitals reviewed.   Constitutional:       General: She is awake. She is not in acute distress.     Appearance: Normal appearance. She is not ill-appearing, toxic-appearing or diaphoretic.   HENT:      Head: Normocephalic.      Right Ear: Hearing normal.      Left Ear: Hearing normal.      Nose: Nose normal.      Mouth/Throat:      Lips: Pink.      Mouth: Mucous membranes are moist.   Eyes:      General: Lids are normal. Vision grossly intact.   Cardiovascular:      Rate and Rhythm: Normal rate and regular rhythm.      Pulses: Normal pulses.      Heart sounds: Normal heart sounds, S1 normal and S2 normal.   Pulmonary:      Effort: Pulmonary effort is normal.      Breath sounds: Normal breath sounds.   Abdominal:      General: Abdomen is flat. Bowel sounds are normal.      Palpations: Abdomen is soft.      Tenderness: There is no abdominal tenderness. There is no right CVA tenderness, left CVA tenderness, guarding or rebound.   Skin:     General: Skin is warm and  "dry.      Capillary Refill: Capillary refill takes less than 2 seconds.   Neurological:      General: No focal deficit present.      Mental Status: She is alert and oriented to person, place, and time. Mental status is at baseline.   Psychiatric:         Attention and Perception: Attention and perception normal.         Mood and Affect: Mood and affect normal.         Speech: Speech normal.         Behavior: Behavior normal. Behavior is cooperative.         Thought Content: Thought content normal.         Cognition and Memory: Cognition and memory normal.         Judgment: Judgment normal.        Result Review  Data Reviewed:{ Labs  Result Review  Imaging  Med Tab  Media :23}     The following data was reviewed by: DARON Ruiz on 10/24/2024  UA          11/13/2023    09:45 5/15/2024    10:28 10/24/2024    13:45   Urinalysis   Ketones, UA   Negative    Blood, UA Negative  Negative     Leukocytes, UA Negative  Negative  Trace    Nitrite, UA Negative  Negative     POCT urinalysis dipstick, automated (10/24/2024 1:45 PM)            Vital Signs:   /92 (BP Location: Left arm, Patient Position: Sitting, Cuff Size: Adult)   Pulse 76   Temp 97.1 °F (36.2 °C) (Temporal)   Ht 157.5 cm (62.01\")   Wt 94.3 kg (208 lb)   SpO2 100%   BMI 38.03 kg/m²                 Requested Prescriptions     Signed Prescriptions Disp Refills    ciprofloxacin (Cipro) 250 MG tablet 14 tablet 0     Sig: Take 1 tablet by mouth 2 (Two) Times a Day.       Routine medications provided by this office will also be refilled via pharmacy request.       Current Outpatient Medications:     albuterol sulfate  (90 Base) MCG/ACT inhaler, Inhale 2 puffs Every 4 (Four) Hours As Needed for Wheezing., Disp: 18 g, Rfl: 5    budesonide-formoterol (SYMBICORT) 160-4.5 MCG/ACT inhaler, USE 2 INHALATIONS TWICE A DAY, Disp: 30.6 g, Rfl: 3    carvedilol (COREG) 25 MG tablet, TAKE 1 TABLET BY MOUTH TWICE DAILY WITH MEALS, Disp: 180 tablet, " Rfl: 1    Cholecalciferol (EQL Vitamin D3) 50 MCG (2000 UT) capsule, Take  by mouth., Disp: , Rfl:     fexofenadine (ALLEGRA) 180 MG tablet, TAKE 1 TABLET BY MOUTH DAILY, Disp: 90 tablet, Rfl: 3    hydroCHLOROthiazide (MICROZIDE) 12.5 MG capsule, TAKE 1 CAPSULE BY MOUTH EVERY MORNING, Disp: 90 capsule, Rfl: 1    meloxicam (MOBIC) 15 MG tablet, TAKE 1 TABLET BY MOUTH DAILY WITH FOOD, Disp: 90 tablet, Rfl: 3    Probiotic Product (ALIGN PO), Take  by mouth., Disp: , Rfl:     rosuvastatin (CRESTOR) 40 MG tablet, TAKE 1 TABLET BY MOUTH DAILY, Disp: 90 tablet, Rfl: 3    TURMERIC PO, Take 2,000 mg by mouth., Disp: , Rfl:     VITAMIN B COMPLEX-C PO, Take  by mouth., Disp: , Rfl:     ciprofloxacin (Cipro) 250 MG tablet, Take 1 tablet by mouth 2 (Two) Times a Day., Disp: 14 tablet, Rfl: 0    lidocaine (LIDODERM) 5 %, Place 1 patch on the skin as directed by provider Daily. Remove & Discard patch within 12 hours or as directed by MD (Patient not taking: Reported on 10/24/2024), Disp: 14 each, Rfl: 1     Assessment and Plan:      Assessment and Plan {CC Problem List  Visit Diagnosis  ROS  Review (Popup)  Health Maintenance  Quality  BestPractice  Medications  SmartSets  SnapShot Encounters  Media :23}     Problem List Items Addressed This Visit    None  Visit Diagnoses       Cystitis    -  Primary    Urinary frequency        Relevant Orders    POCT urinalysis dipstick, automated (Completed)    Urinalysis, Microscopic Only - Urine, Clean Catch    Urine Culture - Urine, Urine, Clean Catch            Follow Up {Instructions Charge Capture  Follow-up Communications :23}     Return if symptoms worsen or fail to improve.      Patient was given instructions and counseling regarding her condition or for health maintenance advice. Please see specific information pulled into the AVS if appropriate.    I spent 36 minutes caring for Loulou on this date of service. This time includes time spent by me in the following  activities:preparing for the visit, reviewing tests, obtaining and/or reviewing a separately obtained history, performing a medically appropriate examination and/or evaluation , counseling and educating the patient/family/caregiver, ordering medications, tests, or procedures, documenting information in the medical record, independently interpreting results and communicating that information with the patient/family/caregiver, and care coordination    Dragon disclaimer:   Much of this encounter note is an electronic transcription/translation of spoken language to printed text. The electronic translation of spoken language may permit erroneous, or at times, nonsensical words or phrases to be inadvertently transcribed; Although I have reviewed the note for such errors, some may still exist.     Additional Patient Counseling:       Patient Instructions   Urinary Tract Infection, Adult    A urinary tract infection (UTI) is an infection of any part of the urinary tract. The urinary tract includes:  The kidneys.  The ureters.  The bladder.  The urethra.  These organs make, store, and get rid of pee (urine) in the body.  What are the causes?  This infection is caused by germs (bacteria) in your genital area. These germs grow and cause swelling (inflammation) of your urinary tract.  What increases the risk?  The following factors may make you more likely to develop this condition:  Using a small, thin tube (catheter) to drain pee.  Not being able to control when you pee or poop (incontinence).  Being female. If you are female, these things can increase the risk:  Using these methods to prevent pregnancy:  A medicine that kills sperm (spermicide).  A device that blocks sperm (diaphragm).  Having low levels of a female hormone (estrogen).  Being pregnant.  You are more likely to develop this condition if:  You have genes that add to your risk.  You are sexually active.  You take antibiotic medicines.  You have trouble peeing  because of:  A prostate that is bigger than normal, if you are male.  A blockage in the part of your body that drains pee from the bladder.  A kidney stone.  A nerve condition that affects your bladder.  Not getting enough to drink.  Not peeing often enough.  You have other conditions, such as:  Diabetes.  A weak disease-fighting system (immune system).  Sickle cell disease.  Gout.  Injury of the spine.  What are the signs or symptoms?  Symptoms of this condition include:  Needing to pee right away.  Peeing small amounts often.  Pain or burning when peeing.  Blood in the pee.  Pee that smells bad or not like normal.  Trouble peeing.  Pee that is cloudy.  Fluid coming from the vagina, if you are female.  Pain in the belly or lower back.  Other symptoms include:  Vomiting.  Not feeling hungry.  Feeling mixed up (confused). This may be the first symptom in older adults.  Being tired and grouchy (irritable).  A fever.  Watery poop (diarrhea).  How is this treated?  Taking antibiotic medicine.  Taking other medicines.  Drinking enough water.  In some cases, you may need to see a specialist.  Follow these instructions at home:    Medicines  Take over-the-counter and prescription medicines only as told by your doctor.  If you were prescribed an antibiotic medicine, take it as told by your doctor. Do not stop taking it even if you start to feel better.  General instructions  Make sure you:  Pee until your bladder is empty.  Do not hold pee for a long time.  Empty your bladder after sex.  Wipe from front to back after peeing or pooping if you are a female. Use each tissue one time when you wipe.  Drink enough fluid to keep your pee pale yellow.  Keep all follow-up visits.  Contact a doctor if:  You do not get better after 1-2 days.  Your symptoms go away and then come back.  Get help right away if:  You have very bad back pain.  You have very bad pain in your lower belly.  You have a fever.  You have chills.  You feeling  like you will vomit or you vomit.  Summary  A urinary tract infection (UTI) is an infection of any part of the urinary tract.  This condition is caused by germs in your genital area.  There are many risk factors for a UTI.  Treatment includes antibiotic medicines.  Drink enough fluid to keep your pee pale yellow.  This information is not intended to replace advice given to you by your health care provider. Make sure you discuss any questions you have with your health care provider.  Document Revised: 07/25/2021 Document Reviewed: 07/30/2021  Elsevier Patient Education © 2024 [employer]

## 2024-10-24 NOTE — PATIENT INSTRUCTIONS
Urinary Tract Infection, Adult    A urinary tract infection (UTI) is an infection of any part of the urinary tract. The urinary tract includes:  The kidneys.  The ureters.  The bladder.  The urethra.  These organs make, store, and get rid of pee (urine) in the body.  What are the causes?  This infection is caused by germs (bacteria) in your genital area. These germs grow and cause swelling (inflammation) of your urinary tract.  What increases the risk?  The following factors may make you more likely to develop this condition:  Using a small, thin tube (catheter) to drain pee.  Not being able to control when you pee or poop (incontinence).  Being female. If you are female, these things can increase the risk:  Using these methods to prevent pregnancy:  A medicine that kills sperm (spermicide).  A device that blocks sperm (diaphragm).  Having low levels of a female hormone (estrogen).  Being pregnant.  You are more likely to develop this condition if:  You have genes that add to your risk.  You are sexually active.  You take antibiotic medicines.  You have trouble peeing because of:  A prostate that is bigger than normal, if you are male.  A blockage in the part of your body that drains pee from the bladder.  A kidney stone.  A nerve condition that affects your bladder.  Not getting enough to drink.  Not peeing often enough.  You have other conditions, such as:  Diabetes.  A weak disease-fighting system (immune system).  Sickle cell disease.  Gout.  Injury of the spine.  What are the signs or symptoms?  Symptoms of this condition include:  Needing to pee right away.  Peeing small amounts often.  Pain or burning when peeing.  Blood in the pee.  Pee that smells bad or not like normal.  Trouble peeing.  Pee that is cloudy.  Fluid coming from the vagina, if you are female.  Pain in the belly or lower back.  Other symptoms include:  Vomiting.  Not feeling hungry.  Feeling mixed up (confused). This may be the first symptom  in older adults.  Being tired and grouchy (irritable).  A fever.  Watery poop (diarrhea).  How is this treated?  Taking antibiotic medicine.  Taking other medicines.  Drinking enough water.  In some cases, you may need to see a specialist.  Follow these instructions at home:    Medicines  Take over-the-counter and prescription medicines only as told by your doctor.  If you were prescribed an antibiotic medicine, take it as told by your doctor. Do not stop taking it even if you start to feel better.  General instructions  Make sure you:  Pee until your bladder is empty.  Do not hold pee for a long time.  Empty your bladder after sex.  Wipe from front to back after peeing or pooping if you are a female. Use each tissue one time when you wipe.  Drink enough fluid to keep your pee pale yellow.  Keep all follow-up visits.  Contact a doctor if:  You do not get better after 1-2 days.  Your symptoms go away and then come back.  Get help right away if:  You have very bad back pain.  You have very bad pain in your lower belly.  You have a fever.  You have chills.  You feeling like you will vomit or you vomit.  Summary  A urinary tract infection (UTI) is an infection of any part of the urinary tract.  This condition is caused by germs in your genital area.  There are many risk factors for a UTI.  Treatment includes antibiotic medicines.  Drink enough fluid to keep your pee pale yellow.  This information is not intended to replace advice given to you by your health care provider. Make sure you discuss any questions you have with your health care provider.  Document Revised: 07/25/2021 Document Reviewed: 07/30/2021  Elsevier Patient Education © 2024 [employer] Inc[employer]

## 2024-10-28 LAB
BACTERIA #/AREA URNS HPF: ABNORMAL /HPF
BACTERIA UR CULT: ABNORMAL
BACTERIA UR CULT: ABNORMAL
CASTS URNS MICRO: ABNORMAL
EPI CELLS #/AREA URNS HPF: ABNORMAL /HPF
OTHER ANTIBIOTIC SUSC ISLT: ABNORMAL
RBC #/AREA URNS HPF: ABNORMAL /HPF
WBC #/AREA URNS HPF: ABNORMAL /HPF

## 2024-10-29 ENCOUNTER — TELEPHONE (OUTPATIENT)
Dept: INTERNAL MEDICINE | Facility: CLINIC | Age: 84
End: 2024-10-29
Payer: MEDICARE

## 2024-10-29 NOTE — TELEPHONE ENCOUNTER
----- Message from Bing Sliva sent at 10/29/2024 12:50 PM EDT -----  Ms Collier,   Your urine cultures have resulted.  At this time the antibiotic that I have prescribed you should eliminate your infection.  If symptoms continue please follow-up with Dr. Mancilla or myself.  Please let us know if you have any further questions or concerns.  Thank you for allowing us to care for you,  DARON Ruiz

## 2024-11-19 ENCOUNTER — OFFICE VISIT (OUTPATIENT)
Dept: INTERNAL MEDICINE | Facility: CLINIC | Age: 84
End: 2024-11-19
Payer: MEDICARE

## 2024-11-19 VITALS
HEART RATE: 75 BPM | OXYGEN SATURATION: 98 % | SYSTOLIC BLOOD PRESSURE: 128 MMHG | WEIGHT: 209 LBS | DIASTOLIC BLOOD PRESSURE: 66 MMHG | BODY MASS INDEX: 38.22 KG/M2

## 2024-11-19 DIAGNOSIS — Z87.898 HISTORY OF WHEEZING: Primary | ICD-10-CM

## 2024-11-19 DIAGNOSIS — R73.09 ELEVATED GLUCOSE: ICD-10-CM

## 2024-11-19 DIAGNOSIS — I10 BENIGN ESSENTIAL HTN: ICD-10-CM

## 2024-11-19 DIAGNOSIS — L98.8 SKIN LESION OF BREAST: ICD-10-CM

## 2024-11-19 DIAGNOSIS — E78.89 ELEVATED HDL: ICD-10-CM

## 2024-11-19 DIAGNOSIS — J45.20 MILD INTERMITTENT ASTHMA WITHOUT COMPLICATION: ICD-10-CM

## 2024-11-19 DIAGNOSIS — E53.8 B12 DEFICIENCY: ICD-10-CM

## 2024-11-19 DIAGNOSIS — E78.00 ELEVATED CHOLESTEROL: ICD-10-CM

## 2024-11-19 LAB
ALBUMIN SERPL-MCNC: 3.9 G/DL (ref 3.5–5.2)
ALBUMIN/GLOB SERPL: 1.7 G/DL
ALP SERPL-CCNC: 72 U/L (ref 39–117)
ALT SERPL-CCNC: 14 U/L (ref 1–33)
APPEARANCE UR: CLEAR
AST SERPL-CCNC: 20 U/L (ref 1–32)
BASOPHILS # BLD AUTO: 0.03 10*3/MM3 (ref 0–0.2)
BASOPHILS NFR BLD AUTO: 0.6 % (ref 0–1.5)
BILIRUB SERPL-MCNC: 0.4 MG/DL (ref 0–1.2)
BILIRUB UR QL STRIP: NEGATIVE
BUN SERPL-MCNC: 18 MG/DL (ref 8–23)
BUN/CREAT SERPL: 20 (ref 7–25)
CALCIUM SERPL-MCNC: 9.3 MG/DL (ref 8.6–10.5)
CHLORIDE SERPL-SCNC: 104 MMOL/L (ref 98–107)
CHOLEST SERPL-MCNC: 142 MG/DL (ref 0–200)
CHOLEST/HDLC SERPL: 2.84 {RATIO}
CO2 SERPL-SCNC: 30.7 MMOL/L (ref 22–29)
COLOR UR: ABNORMAL
CREAT SERPL-MCNC: 0.9 MG/DL (ref 0.57–1)
EGFRCR SERPLBLD CKD-EPI 2021: 64 ML/MIN/1.73
EOSINOPHIL # BLD AUTO: 0.22 10*3/MM3 (ref 0–0.4)
EOSINOPHIL NFR BLD AUTO: 4.8 % (ref 0.3–6.2)
ERYTHROCYTE [DISTWIDTH] IN BLOOD BY AUTOMATED COUNT: 13.5 % (ref 12.3–15.4)
GLOBULIN SER CALC-MCNC: 2.3 GM/DL
GLUCOSE SERPL-MCNC: 93 MG/DL (ref 65–99)
GLUCOSE UR QL STRIP: NEGATIVE
HBA1C MFR BLD: 5.8 % (ref 4.8–5.6)
HCT VFR BLD AUTO: 40.9 % (ref 34–46.6)
HDLC SERPL-MCNC: 50 MG/DL (ref 40–60)
HGB BLD-MCNC: 12.9 G/DL (ref 12–15.9)
HGB UR QL STRIP: NEGATIVE
IMM GRANULOCYTES # BLD AUTO: 0.01 10*3/MM3 (ref 0–0.05)
IMM GRANULOCYTES NFR BLD AUTO: 0.2 % (ref 0–0.5)
KETONES UR QL STRIP: NEGATIVE
LDLC SERPL CALC-MCNC: 82 MG/DL (ref 0–100)
LEUKOCYTE ESTERASE UR QL STRIP: NEGATIVE
LYMPHOCYTES # BLD AUTO: 1.25 10*3/MM3 (ref 0.7–3.1)
LYMPHOCYTES NFR BLD AUTO: 27 % (ref 19.6–45.3)
MCH RBC QN AUTO: 27.8 PG (ref 26.6–33)
MCHC RBC AUTO-ENTMCNC: 31.5 G/DL (ref 31.5–35.7)
MCV RBC AUTO: 88.1 FL (ref 79–97)
MONOCYTES # BLD AUTO: 0.28 10*3/MM3 (ref 0.1–0.9)
MONOCYTES NFR BLD AUTO: 6 % (ref 5–12)
NEUTROPHILS # BLD AUTO: 2.84 10*3/MM3 (ref 1.7–7)
NEUTROPHILS NFR BLD AUTO: 61.4 % (ref 42.7–76)
NITRITE UR QL STRIP: NEGATIVE
NRBC BLD AUTO-RTO: 0 /100 WBC (ref 0–0.2)
PH UR STRIP: 7 [PH] (ref 5–8)
PLATELET # BLD AUTO: 186 10*3/MM3 (ref 140–450)
POTASSIUM SERPL-SCNC: 4.2 MMOL/L (ref 3.5–5.2)
PROT SERPL-MCNC: 6.2 G/DL (ref 6–8.5)
PROT UR QL STRIP: NEGATIVE
RBC # BLD AUTO: 4.64 10*6/MM3 (ref 3.77–5.28)
SODIUM SERPL-SCNC: 143 MMOL/L (ref 136–145)
SP GR UR STRIP: 1.02 (ref 1–1.03)
T4 FREE SERPL-MCNC: 1.17 NG/DL (ref 0.92–1.68)
TRIGL SERPL-MCNC: 46 MG/DL (ref 0–150)
TSH SERPL DL<=0.005 MIU/L-ACNC: 2.13 UIU/ML (ref 0.27–4.2)
UROBILINOGEN UR STRIP-MCNC: ABNORMAL MG/DL
VIT B12 SERPL-MCNC: 610 PG/ML (ref 211–946)
VLDLC SERPL CALC-MCNC: 10 MG/DL (ref 5–40)
WBC # BLD AUTO: 4.63 10*3/MM3 (ref 3.4–10.8)

## 2024-11-19 PROCEDURE — 1159F MED LIST DOCD IN RCRD: CPT | Performed by: FAMILY MEDICINE

## 2024-11-19 PROCEDURE — 3078F DIAST BP <80 MM HG: CPT | Performed by: FAMILY MEDICINE

## 2024-11-19 PROCEDURE — 1125F AMNT PAIN NOTED PAIN PRSNT: CPT | Performed by: FAMILY MEDICINE

## 2024-11-19 PROCEDURE — 99214 OFFICE O/P EST MOD 30 MIN: CPT | Performed by: FAMILY MEDICINE

## 2024-11-19 PROCEDURE — 1160F RVW MEDS BY RX/DR IN RCRD: CPT | Performed by: FAMILY MEDICINE

## 2024-11-19 PROCEDURE — 3074F SYST BP LT 130 MM HG: CPT | Performed by: FAMILY MEDICINE

## 2024-11-19 PROCEDURE — G2211 COMPLEX E/M VISIT ADD ON: HCPCS | Performed by: FAMILY MEDICINE

## 2024-11-19 NOTE — PROGRESS NOTES
"Chief Complaint  Hypertension, Hyperlipidemia, and Osteoarthritis    Subjective        Loulou Collier presents to [facility]  History of Present Illness  Loulou   is a delightful lady whose had some shoulder pain went to orthopedics and that seems to be settled down.  Otherwise current treatment of hypertension double check lipid panel.  She has had some skin lesions present that are irritated will get dermatology to take a look.    Otherwise labs will be drawn with a follow-up with pulmonary double check history of wheezing and dyspnea and some mild asthma.  Hypertension    Hyperlipidemia    Osteoarthritis  Her past medical history is significant for osteoarthritis.   Answers submitted by the patient for this visit:  Other (Submitted on 11/18/2024)  Please describe your symptoms.: Yearly Physical  Have you had these symptoms before?: No  How long have you been having these symptoms?: 1-4 days  Please list any medications you are currently taking for this condition.: None  Please describe any probable cause for these symptoms. : None  Primary Reason for Visit (Submitted on 11/18/2024)  What is the primary reason for your visit?: Problem Not Listed      Objective   Vital Signs:  /66 (BP Location: Right arm, Patient Position: Sitting, Cuff Size: Large Adult)   Pulse 75   Wt 94.8 kg (209 lb)   SpO2 98%   BMI 38.22 kg/m²   Estimated body mass index is 38.22 kg/m² as calculated from the following:    Height as of 10/24/24: 157.5 cm (62.01\").    Weight as of this encounter: 94.8 kg (209 lb).               Physical Exam  Vitals reviewed.   Constitutional:       Appearance: She is well-developed. She is obese.   HENT:      Head: Normocephalic and atraumatic.      Right Ear: Tympanic membrane and external ear normal.      Left Ear: Tympanic membrane and external ear normal.      Nose: Nose normal.   Eyes:      Conjunctiva/sclera: Conjunctivae normal.      Pupils: Pupils are equal, " round, and reactive to light.   Neck:      Thyroid: No thyromegaly.      Vascular: No JVD.   Cardiovascular:      Rate and Rhythm: Normal rate and regular rhythm.      Heart sounds: Normal heart sounds.   Pulmonary:      Effort: Pulmonary effort is normal.      Breath sounds: Normal breath sounds.   Abdominal:      General: Bowel sounds are normal.      Palpations: Abdomen is soft.   Musculoskeletal:         General: Normal range of motion.      Cervical back: Normal range of motion and neck supple.   Lymphadenopathy:      Cervical: No cervical adenopathy.   Skin:     General: Skin is warm and dry.      Findings: No rash.      Comments: Multiple seborrheic keratoses over the trunk with 2 scaly keratoses which are itchy near the left axilla   Neurological:      Mental Status: She is alert and oriented to person, place, and time.      Cranial Nerves: No cranial nerve deficit.      Coordination: Coordination normal.   Psychiatric:         Behavior: Behavior normal.         Thought Content: Thought content normal.         Judgment: Judgment normal.        Result Review :    The following data was reviewed by: Abebe Mancilla MD on 11/19/2024:  Common labs          5/15/2024    10:28   Common Labs   Glucose 89    BUN 15    Creatinine 0.86    Sodium 144    Potassium 4.5    Chloride 103    Calcium 9.6    Total Protein 6.2    Albumin 4.1    Total Bilirubin 0.3    Alkaline Phosphatase 70    AST (SGOT) 19    ALT (SGPT) 14    WBC 4.59    Hemoglobin 13.0    Hematocrit 41.5    Platelets 171    Total Cholesterol 147    Triglycerides 47    HDL Cholesterol 56    LDL Cholesterol  81    Hemoglobin A1C 5.90                   Assessment and Plan     Assessment & Plan  History of wheezing    Orders:    Ambulatory Referral to Pulmonology    Benign essential HTN   new current antihypertensives         Elevated cholesterol    double check lipid panel has not required any pharmacologic therapy         Mild intermittent asthma without  complication   referral for check up with Dr. Delgado               Skin lesion of breast    Orders:    Ambulatory Referral to Dermatology  Referral to Dr. Leggett for 2 skin lesions near the left breast            Follow Up     No follow-ups on file.  Patient was given instructions and counseling regarding her condition or for health maintenance advice. Please see specific information pulled into the AVS if appropriate.

## 2024-12-02 RX ORDER — BUDESONIDE AND FORMOTEROL FUMARATE DIHYDRATE 160; 4.5 UG/1; UG/1
2 AEROSOL RESPIRATORY (INHALATION) 2 TIMES DAILY
Qty: 30.6 G | Refills: 3 | Status: SHIPPED | OUTPATIENT
Start: 2024-12-02

## 2024-12-02 RX ORDER — MELOXICAM 15 MG/1
15 TABLET ORAL DAILY
Qty: 90 TABLET | Refills: 3 | Status: SHIPPED | OUTPATIENT
Start: 2024-12-02

## 2024-12-02 NOTE — TELEPHONE ENCOUNTER
Rx Refill Note  Requested Prescriptions     Pending Prescriptions Disp Refills    meloxicam (MOBIC) 15 MG tablet [Pharmacy Med Name: MELOXICAM 15MG TABLETS] 90 tablet 3     Sig: TAKE 1 TABLET BY MOUTH DAILY WITH FOOD      Last office visit with prescribing clinician: 11/19/2024   Last telemedicine visit with prescribing clinician: Visit date not found   Next office visit with prescribing clinician: 5/27/2025                         Would you like a call back once the refill request has been completed: [] Yes [] No    If the office needs to give you a call back, can they leave a voicemail: [] Yes [] No    Sharita Yanez  12/02/24, 10:07 EST

## 2024-12-02 NOTE — TELEPHONE ENCOUNTER
Caller: Nando Loulou M    Relationship: Self    Best call back number: [phone]    Requested Prescriptions:   Requested Prescriptions     Pending Prescriptions Disp Refills    budesonide-formoterol (SYMBICORT) 160-4.5 MCG/ACT inhaler 30.6 g 3     Sig: Inhale 2 puffs 2 (Two) Times a Day.        Pharmacy where request should be sent: [facility] [address] [phone] [facility] [phone]      Last office visit with prescribing clinician: 11/19/2024   Last telemedicine visit with prescribing clinician: Visit date not found   Next office visit with prescribing clinician: 5/27/2025     Additional details provided by patient: PATIENT IS OUT OF THIS MEDICATION.     Does the patient have less than a 3 day supply:  [x] Yes  [] No    Would you like a call back once the refill request has been completed: [] Yes [x] No    If the office needs to give you a call back, can they leave a voicemail: [] Yes [x] No    Matt Mcclure Rep   12/02/24 10:23 EST

## 2024-12-06 RX ORDER — HYDROCHLOROTHIAZIDE 12.5 MG/1
12.5 CAPSULE ORAL EVERY MORNING
Qty: 90 CAPSULE | Refills: 1 | Status: SHIPPED | OUTPATIENT
Start: 2024-12-06

## 2025-01-21 DIAGNOSIS — I10 BENIGN ESSENTIAL HTN: ICD-10-CM

## 2025-01-22 RX ORDER — CARVEDILOL 25 MG/1
TABLET ORAL
Qty: 180 TABLET | Refills: 1 | Status: SHIPPED | OUTPATIENT
Start: 2025-01-22

## 2025-02-04 DIAGNOSIS — J30.1 SEASONAL ALLERGIC RHINITIS DUE TO POLLEN: ICD-10-CM

## 2025-02-04 RX ORDER — FEXOFENADINE HCL 180 MG/1
180 TABLET ORAL DAILY
Qty: 90 TABLET | Refills: 3 | Status: SHIPPED | OUTPATIENT
Start: 2025-02-04

## 2025-02-04 NOTE — TELEPHONE ENCOUNTER
Rx Refill Note  Requested Prescriptions     Pending Prescriptions Disp Refills    fexofenadine (ALLEGRA) 180 MG tablet [Pharmacy Med Name: FEXOFENADINE 180MG TABLETS (OTC)] 90 tablet 3     Sig: TAKE 1 TABLET BY MOUTH DAILY      Last office visit with prescribing clinician: 11/19/2024   Last telemedicine visit with prescribing clinician: Visit date not found   Next office visit with prescribing clinician: 5/27/2025       Monae Dean MA  02/04/25, 15:06 EST

## 2025-04-22 ENCOUNTER — OFFICE VISIT (OUTPATIENT)
Dept: INTERNAL MEDICINE | Facility: CLINIC | Age: 85
End: 2025-04-22
Payer: MEDICARE

## 2025-04-22 VITALS
WEIGHT: 206.6 LBS | HEART RATE: 57 BPM | OXYGEN SATURATION: 100 % | TEMPERATURE: 97.6 F | BODY MASS INDEX: 38.02 KG/M2 | HEIGHT: 62 IN | DIASTOLIC BLOOD PRESSURE: 79 MMHG | SYSTOLIC BLOOD PRESSURE: 149 MMHG

## 2025-04-22 DIAGNOSIS — M79.10 GENERALIZED MUSCLE ACHE: Primary | ICD-10-CM

## 2025-04-22 PROCEDURE — 3077F SYST BP >= 140 MM HG: CPT

## 2025-04-22 PROCEDURE — 1160F RVW MEDS BY RX/DR IN RCRD: CPT

## 2025-04-22 PROCEDURE — 1125F AMNT PAIN NOTED PAIN PRSNT: CPT

## 2025-04-22 PROCEDURE — 3078F DIAST BP <80 MM HG: CPT

## 2025-04-22 PROCEDURE — 1159F MED LIST DOCD IN RCRD: CPT

## 2025-04-22 PROCEDURE — 99213 OFFICE O/P EST LOW 20 MIN: CPT

## 2025-04-22 RX ORDER — METHYLPREDNISOLONE 4 MG/1
TABLET ORAL
Qty: 21 TABLET | Refills: 0 | Status: SHIPPED | OUTPATIENT
Start: 2025-04-22

## 2025-04-22 NOTE — PROGRESS NOTES
"Chief Complaint  Leg Pain (Right leg - x6 weeks )    Subjective        Loulou Collier presents to [facility]  History of Present Illness  85-year-old female presenting with generalized leg pain.  Patient of Dr. Mancilla.  States that her right leg started hurting about 8 weeks ago.  Mostly in the lower leg and in the front but also has been creeping up into her upper right leg.  This past week started having pain in her lower left leg as well.  Pain previously was coming and going but has become more constant.  Denies any trauma, injury or swelling.  Pain does not wake her up at night but she wakes up in the morning with the pain present.  States that does not feel worse when walking or sitting.  Has tried over-the-counter Voltaren which helps somewhat.  Taking meloxicam daily as prescribed.    Has been seeing a  for rehab of her knees every other week.  Has been taking rosuvastatin 40 mg daily.  Previously on simvastatin.    Will decrease rosuvastatin to every other day to determine if any muscle aches could be due to side effect.  Leg Pain         Objective   Vital Signs:  /79 (BP Location: Right arm, Patient Position: Sitting, Cuff Size: Large Adult)   Pulse 57   Temp 97.6 °F (36.4 °C) (Temporal)   Ht 157.5 cm (62.01\")   Wt 93.7 kg (206 lb 9.6 oz)   SpO2 100%   BMI 37.78 kg/m²   Estimated body mass index is 37.78 kg/m² as calculated from the following:    Height as of this encounter: 157.5 cm (62.01\").    Weight as of this encounter: 93.7 kg (206 lb 9.6 oz).               Physical Exam  Vitals reviewed.   Constitutional:       Appearance: Normal appearance.   HENT:      Head: Normocephalic.   Musculoskeletal:         General: No swelling or tenderness. Normal range of motion.      Cervical back: Normal range of motion.   Skin:     General: Skin is warm and dry.      Capillary Refill: Capillary refill takes less than 2 seconds.   Neurological:      General: No focal " deficit present.      Mental Status: She is alert and oriented to person, place, and time.   Psychiatric:         Mood and Affect: Mood normal.         Behavior: Behavior normal.         Thought Content: Thought content normal.         Judgment: Judgment normal.        Result Review :      Common labs          5/15/2024    10:28 11/19/2024    09:52   Common Labs   Glucose 89  93    BUN 15  18    Creatinine 0.86  0.90    Sodium 144  143    Potassium 4.5  4.2    Chloride 103  104    Calcium 9.6  9.3    Albumin 4.1  3.9    Total Bilirubin 0.3  0.4    Alkaline Phosphatase 70  72    AST (SGOT) 19  20    ALT (SGPT) 14  14    WBC 4.59  4.63    Hemoglobin 13.0  12.9    Hematocrit 41.5  40.9    Platelets 171  186    Total Cholesterol 147  142    Triglycerides 47  46    HDL Cholesterol 56  50    LDL Cholesterol  81  82    Hemoglobin A1C 5.90  5.80          Current Outpatient Medications on File Prior to Visit   Medication Sig Dispense Refill    albuterol sulfate  (90 Base) MCG/ACT inhaler Inhale 2 puffs Every 4 (Four) Hours As Needed for Wheezing. 18 g 5    budesonide-formoterol (SYMBICORT) 160-4.5 MCG/ACT inhaler Inhale 2 puffs 2 (Two) Times a Day. 30.6 g 3    carvedilol (COREG) 25 MG tablet TAKE 1 TABLET BY MOUTH TWICE DAILY WITH MEALS 180 tablet 1    Cholecalciferol (EQL Vitamin D3) 50 MCG (2000 UT) capsule Take  by mouth.      fexofenadine (ALLEGRA) 180 MG tablet TAKE 1 TABLET BY MOUTH DAILY 90 tablet 3    hydroCHLOROthiazide (MICROZIDE) 12.5 MG capsule TAKE 1 CAPSULE BY MOUTH EVERY MORNING 90 capsule 1    meloxicam (MOBIC) 15 MG tablet TAKE 1 TABLET BY MOUTH DAILY WITH FOOD 90 tablet 3    Probiotic Product (ALIGN PO) Take  by mouth.      rosuvastatin (CRESTOR) 40 MG tablet TAKE 1 TABLET BY MOUTH DAILY 90 tablet 3    TURMERIC PO Take 2,000 mg by mouth.      VITAMIN B COMPLEX-C PO Take  by mouth.       No current facility-administered medications on file prior to visit.                Assessment and Plan      Diagnoses and all orders for this visit:    1. Generalized muscle ache (Primary)  -     methylPREDNISolone (MEDROL) 4 MG dose pack; Take as directed on package instructions.  Dispense: 21 tablet; Refill: 0        Patient Instructions   Decrease rosuvastatin 40 mg to every OTHER day. Monitor for symptom improvement. Take medrol dose pack as prescribed with food. Stay hydrated with water. Stay active. Recommend topical analgesic as needed. Follow-up with Dr. Mancilla as scheduled.            Follow Up     No follow-ups on file.  Patient was given instructions and counseling regarding her condition or for health maintenance advice. Please see specific information pulled into the AVS if appropriate.

## 2025-04-22 NOTE — PATIENT INSTRUCTIONS
Decrease rosuvastatin 40 mg to every OTHER day. Monitor for symptom improvement. Take medrol dose pack as prescribed with food. Stay hydrated with water. Stay active. Recommend topical analgesic as needed. Follow-up with Dr. Mancilla as scheduled.

## 2025-05-08 ENCOUNTER — TELEPHONE (OUTPATIENT)
Dept: ORTHOPEDIC SURGERY | Facility: CLINIC | Age: 85
End: 2025-05-08

## 2025-05-08 NOTE — TELEPHONE ENCOUNTER
Caller: Loulou Collier    Relationship: Self    Best call back number:     What is the best time to reach you: ANYTIME    Who are you requesting to speak with (clinical staff, provider,  specific staff member): PREVIOUS PATIENT OF ADÁN ALCANTARA    What was the call regarding: PATIENT HAD TWO TKR ONE ON 8.2.17 AND THE OTHER ON 7.29.14 WITH DR. ADÁN ALCANTARA AND WAS TAKING ANTIBIOTICS FOR DENTAL VISITS AND IS WONDERING IF THOSE STILL NEED TO BE TAKEN FOR THOSE APPTS. PLEASE CONTACT PATIENT TO ADVISE IF THIS MEDICATION IS STILL NEEDED FOR THOSE APPTS AND TO CALL IT IN IF NEEDED AND POSSIBLE.    Is it okay if the provider responds through [employer]: CALL

## 2025-05-27 ENCOUNTER — OFFICE VISIT (OUTPATIENT)
Dept: INTERNAL MEDICINE | Facility: CLINIC | Age: 85
End: 2025-05-27
Payer: MEDICARE

## 2025-05-27 VITALS
WEIGHT: 205 LBS | BODY MASS INDEX: 37.49 KG/M2 | HEART RATE: 62 BPM | SYSTOLIC BLOOD PRESSURE: 126 MMHG | OXYGEN SATURATION: 98 % | DIASTOLIC BLOOD PRESSURE: 76 MMHG

## 2025-05-27 DIAGNOSIS — E78.89 ELEVATED HDL: ICD-10-CM

## 2025-05-27 DIAGNOSIS — M17.0 PRIMARY OSTEOARTHRITIS OF BOTH KNEES: ICD-10-CM

## 2025-05-27 DIAGNOSIS — Z00.00 MEDICARE ANNUAL WELLNESS VISIT, SUBSEQUENT: Primary | ICD-10-CM

## 2025-05-27 DIAGNOSIS — R73.09 ELEVATED GLUCOSE: ICD-10-CM

## 2025-05-27 DIAGNOSIS — E55.9 HYPOVITAMINOSIS D: ICD-10-CM

## 2025-05-27 DIAGNOSIS — Z87.898 HISTORY OF WHEEZING: ICD-10-CM

## 2025-05-27 DIAGNOSIS — J45.998 SEASONAL ASTHMA: ICD-10-CM

## 2025-05-27 DIAGNOSIS — E78.00 ELEVATED CHOLESTEROL: ICD-10-CM

## 2025-05-27 DIAGNOSIS — R25.2 LEG CRAMPS: ICD-10-CM

## 2025-05-27 DIAGNOSIS — J30.1 SEASONAL ALLERGIC RHINITIS DUE TO POLLEN: ICD-10-CM

## 2025-05-27 DIAGNOSIS — E53.8 B12 DEFICIENCY: ICD-10-CM

## 2025-05-27 DIAGNOSIS — I10 BENIGN ESSENTIAL HTN: ICD-10-CM

## 2025-05-27 DIAGNOSIS — J40 BRONCHITIS: ICD-10-CM

## 2025-05-27 DIAGNOSIS — Z12.31 ENCOUNTER FOR SCREENING MAMMOGRAM FOR MALIGNANT NEOPLASM OF BREAST: ICD-10-CM

## 2025-05-27 RX ORDER — ALBUTEROL SULFATE 90 UG/1
2 INHALANT RESPIRATORY (INHALATION) EVERY 4 HOURS PRN
Qty: 18 G | Refills: 5 | Status: SHIPPED | OUTPATIENT
Start: 2025-05-27

## 2025-05-27 RX ORDER — HYDROCHLOROTHIAZIDE 12.5 MG/1
12.5 CAPSULE ORAL EVERY MORNING
Qty: 90 CAPSULE | Refills: 1 | Status: SHIPPED | OUTPATIENT
Start: 2025-05-27

## 2025-05-27 RX ORDER — ROSUVASTATIN CALCIUM 20 MG/1
20 TABLET, COATED ORAL DAILY
Qty: 90 TABLET | Refills: 3 | Status: SHIPPED | OUTPATIENT
Start: 2025-05-27

## 2025-05-27 RX ORDER — MELOXICAM 15 MG/1
15 TABLET ORAL DAILY
Qty: 90 TABLET | Refills: 3 | Status: SHIPPED | OUTPATIENT
Start: 2025-05-27

## 2025-05-27 RX ORDER — CARVEDILOL 25 MG/1
25 TABLET ORAL 2 TIMES DAILY WITH MEALS
Qty: 180 TABLET | Refills: 1 | Status: SHIPPED | OUTPATIENT
Start: 2025-05-27

## 2025-05-27 RX ORDER — FEXOFENADINE HCL 180 MG/1
180 TABLET ORAL DAILY
Qty: 90 TABLET | Refills: 3 | Status: SHIPPED | OUTPATIENT
Start: 2025-05-27

## 2025-05-27 NOTE — PROGRESS NOTES
Subjective   The ABCs of the Annual Wellness Visit  Medicare Wellness Visit      Loulou Collier is a 85 y.o. patient who presents for a Medicare Wellness Visit.    The following portions of the patient's history were reviewed and   updated as appropriate: allergies, current medications, past family history, past medical history, past social history, past surgical history, and problem list.    Compared to one year ago, the patient's physical   health is the same.  Compared to one year ago, the patient's mental   health is the same.    Recent Hospitalizations:  She was not admitted to the hospital during the last year.     Current Medical Providers:  Patient Care Team:  Abebe Mancilla MD as PCP - General (Family Medicine)  Araceli Leggett MD as Consulting Physician (Dermatology)  Sally Nuno MD as Surgeon (General Surgery)    Outpatient Medications Prior to Visit   Medication Sig Dispense Refill    budesonide-formoterol (SYMBICORT) 160-4.5 MCG/ACT inhaler Inhale 2 puffs 2 (Two) Times a Day. 30.6 g 3    Cholecalciferol (EQL Vitamin D3) 50 MCG (2000 UT) capsule Take  by mouth.      Probiotic Product (ALIGN PO) Take  by mouth.      TURMERIC PO Take 2,000 mg by mouth.      VITAMIN B COMPLEX-C PO Take  by mouth.      albuterol sulfate  (90 Base) MCG/ACT inhaler Inhale 2 puffs Every 4 (Four) Hours As Needed for Wheezing. 18 g 5    carvedilol (COREG) 25 MG tablet TAKE 1 TABLET BY MOUTH TWICE DAILY WITH MEALS 180 tablet 1    fexofenadine (ALLEGRA) 180 MG tablet TAKE 1 TABLET BY MOUTH DAILY 90 tablet 3    hydroCHLOROthiazide (MICROZIDE) 12.5 MG capsule TAKE 1 CAPSULE BY MOUTH EVERY MORNING 90 capsule 1    meloxicam (MOBIC) 15 MG tablet TAKE 1 TABLET BY MOUTH DAILY WITH FOOD 90 tablet 3    methylPREDNISolone (MEDROL) 4 MG dose pack Take as directed on package instructions. 21 tablet 0    rosuvastatin (CRESTOR) 40 MG tablet TAKE 1 TABLET BY MOUTH DAILY (Patient taking differently: Take 1 tablet by mouth Every  "Other Day.) 90 tablet 3     No facility-administered medications prior to visit.     No opioid medication identified on active medication list. I have reviewed chart for other potential  high risk medication/s and harmful drug interactions in the elderly.      Aspirin is not on active medication list.  Aspirin use is not indicated based on review of current medical condition/s. Risk of harm outweighs potential benefits.  .    Patient Active Problem List   Diagnosis    Elevated cholesterol    Benign essential HTN    H/O hysterectomy for benign disease    Medicare annual wellness visit, subsequent    Primary osteoarthritis of knee    History of total knee arthroplasty, left    Cervical spine crepitus    Iron deficiency anemia due to chronic blood loss    Need for vaccination    Aortic root dilation    Pulmonary nodule    Hydradenitis    Anemia    DJD (degenerative joint disease) of knee    Asthma    Non-seasonal allergic rhinitis due to pollen    Bilateral pseudophakia    Tear film insufficiency     Advance Care Planning Advance Directive is not on file.  ACP discussion was held with the patient during this visit. Patient has an advance directive (not in EMR), copy requested.            Objective   Vitals:    05/27/25 0934   BP: 126/76   BP Location: Left arm   Patient Position: Sitting   Cuff Size: Large Adult   Pulse: 62   SpO2: 98%   Weight: 93 kg (205 lb)   PainSc: 0-No pain       Estimated body mass index is 37.49 kg/m² as calculated from the following:    Height as of 4/22/25: 157.5 cm (62.01\").    Weight as of this encounter: 93 kg (205 lb).                Does the patient have evidence of cognitive impairment? No                                                                                                Health  Risk Assessment    Smoking Status:  Social History     Tobacco Use   Smoking Status Never   Smokeless Tobacco Never     Alcohol Consumption:  Social History     Substance and Sexual Activity "   Alcohol Use No       Fall Risk Screen  STEADI Fall Risk Assessment was completed, and patient is at LOW risk for falls.Assessment completed on:2025    Depression Screening   Little interest or pleasure in doing things? Not at all   Feeling down, depressed, or hopeless? Not at all   PHQ-2 Total Score 0      Health Habits and Functional and Cognitive Screenin/27/2025     8:12 AM   Functional & Cognitive Status   Do you have difficulty preparing food and eating? No   Do you have difficulty bathing yourself, getting dressed or grooming yourself? No   Do you have difficulty using the toilet? No   Do you have difficulty moving around from place to place? No   Do you have trouble with steps or getting out of a bed or a chair? No   Current Diet Well Balanced Diet   Dental Exam Up to date   Eye Exam Up to date   Exercise (times per week) 3 times per week   Current Exercises Include Cardiovascular Workout   Do you need help using the phone?  No   Are you deaf or do you have serious difficulty hearing?  No   Do you need help to go to places out of walking distance? No   Do you need help shopping? No   Do you need help preparing meals?  No   Do you need help with housework?  No   Do you need help with laundry? No   Do you need help taking your medications? No   Do you need help managing money? No   Do you ever drive or ride in a car without wearing a seat belt? No   Have you felt unusual stress, anger or loneliness in the last month? No   Who do you live with? Child   If you need help, do you have trouble finding someone available to you? No   Have you been bothered in the last four weeks by sexual problems? No   Do you have difficulty concentrating, remembering or making decisions? No           Age-appropriate Screening Schedule:  Refer to the list below for future screening recommendations based on patient's age, sex and/or medical conditions. Orders for these recommended tests are listed in the plan section.  The patient has been provided with a written plan.    Health Maintenance List  Health Maintenance   Topic Date Due    TDAP/TD VACCINES (1 - Tdap) Never done    INFLUENZA VACCINE  07/01/2025    LIPID PANEL  11/19/2025    ANNUAL WELLNESS VISIT  05/27/2026    DXA SCAN  08/14/2026    COLORECTAL CANCER SCREENING  08/15/2032    COVID-19 Vaccine  Completed    RSV Vaccine - Adults  Completed    Pneumococcal Vaccine 50+  Completed    ZOSTER VACCINE  Completed    MAMMOGRAM  Discontinued                                                                                                                                                CMS Preventative Services Quick Reference  Risk Factors Identified During Encounter  None Identified    The above risks/problems have been discussed with the patient.  Pertinent information has been shared with the patient in the After Visit Summary.  An After Visit Summary and PPPS were made available to the patient.    Follow Up:   Next Medicare Wellness visit to be scheduled in 1 year.         Additional E&M Note during same encounter follows:  Patient has additional, significant, and separately identifiable condition(s)/problem(s) that require work above and beyond the Medicare Wellness Visit     Chief Complaint  Medicare Wellness-subsequent    Subjective    HPI  Loulou is also being seen today for an annual adult preventative physical exam.  and Loulou is also being seen today for additional medical problem/s.       The patient presents for evaluation of leg cramps and wheezing.    Approximately 5 weeks ago, she sought medical attention from a nurse practitioner due to severe leg cramps, which were causing significant discomfort. The nurse practitioner identified her cholesterol medication as a potential cause and advised her to take it on alternate days. This adjustment in medication frequency resulted in an improvement in her symptoms. She does not take aspirin. She continues to take meloxicam for  arthritis and reports no gastrointestinal issues. Her knee condition has also improved.    She has a history of wheezing, which was particularly problematic about 6 years ago. At that time, she was under the care of Dr. Muñoz, who recommended that she seek medical attention only if necessary. However, it has been several years since her last consultation with him. She is requesting a refill of her wheezing medication, even though she has not used it for several months, as she anticipates the onset of the wheezing season. She uses a rapid-acting inhaler and generic Symbicort, administered as 2 puffs twice daily.    She has a living will. She gets mammograms in the summer, somewhere between now and August.    PAST SURGICAL HISTORY:  Colonoscopy in 2022, which showed diverticulosis.          Objective   Vital Signs:  /76 (BP Location: Left arm, Patient Position: Sitting, Cuff Size: Large Adult)   Pulse 62   Wt 93 kg (205 lb)   SpO2 98%   BMI 37.49 kg/m²   Physical Exam  Vitals reviewed.   Constitutional:       Appearance: She is well-developed. She is obese.   HENT:      Head: Normocephalic and atraumatic.      Right Ear: Tympanic membrane and external ear normal.      Left Ear: Tympanic membrane and external ear normal.      Nose: Nose normal.   Eyes:      Conjunctiva/sclera: Conjunctivae normal.      Pupils: Pupils are equal, round, and reactive to light.   Neck:      Thyroid: No thyromegaly.      Vascular: No JVD.   Cardiovascular:      Rate and Rhythm: Normal rate and regular rhythm.      Heart sounds: Normal heart sounds.   Pulmonary:      Effort: Pulmonary effort is normal.      Breath sounds: Normal breath sounds.   Abdominal:      General: Bowel sounds are normal.      Palpations: Abdomen is soft.   Musculoskeletal:         General: Normal range of motion.      Cervical back: Normal range of motion and neck supple.   Lymphadenopathy:      Cervical: No cervical adenopathy.   Skin:     General: Skin  is warm and dry.      Findings: No rash.   Neurological:      Mental Status: She is alert and oriented to person, place, and time.      Cranial Nerves: No cranial nerve deficit.      Coordination: Coordination normal.   Psychiatric:         Behavior: Behavior normal.         Thought Content: Thought content normal.         Judgment: Judgment normal.           Respiratory: Clear to auscultation, no wheezing, rales or rhonchi    The following data was reviewed by: Abebe Mancilla MD on 05/27/2025:  Data reviewed : Consultant notes pulmonary and colonoscopy  Common labs          11/19/2024    09:52   Common Labs   Glucose 93    BUN 18    Creatinine 0.90    Sodium 143    Potassium 4.2    Chloride 104    Calcium 9.3    Albumin 3.9    Total Bilirubin 0.4    Alkaline Phosphatase 72    AST (SGOT) 20    ALT (SGPT) 14    WBC 4.63    Hemoglobin 12.9    Hematocrit 40.9    Platelets 186    Total Cholesterol 142    Triglycerides 46    HDL Cholesterol 50    LDL Cholesterol  82    Hemoglobin A1C 5.80        Results  Diagnostic Testing   - Colonoscopy: 2022, Showed diverticulosis.                     P                           1.   History of wheezing        ICD-10-CM: Z87.898ICD-9-CM: V12.69              2.   Seasonal asthma        ICD-10-CM: J45.998ICD-9-CM: 493.90              3.   Bronchitis        ICD-10-CM: N42TOR-3-JT: 490              4.   Benign essential HTN        ICD-10-CM: P01SVA-7-QD: 401.1              5.   Seasonal allergic rhinitis due to pollen        ICD-10-CM: J30.1ICD-9-CM: 477.0   Comment: Allegra/fexofenadine 180 mg daily as needed             6.   Encounter for screening mammogram for malignant neoplasm of breast        ICD-10-CM: Z12.31ICD-9-CM: V76.12              7.   Primary osteoarthritis of both knees        ICD-10-CM: M17.0ICD-9-CM: 715.16              8.   Medicare annual wellness visit, subsequent        ICD-10-CM: Z00.00ICD-9-CM: V70.0              9.   Elevated cholesterol        ICD-10-CM:  E78.00ICD-9-CM: 272.0              10.   B12 deficiency        ICD-10-CM: E53.8ICD-9-CM: 266.2              11.   Elevated glucose        ICD-10-CM: R73.09ICD-9-CM: 790.29              12.   Elevated HDL        ICD-10-CM: E78.89ICD-9-CM: 785.9              13.   Hypovitaminosis D        ICD-10-CM: E55.9ICD-9-CM: 268.9            14.   Leg cramps        ICD-10-CM: R25.2ICD-9-CM: 729.82              Assessment and Plan        1. Leg cramps.  - The patient reported experiencing significant leg cramps, which improved after adjusting the cholesterol medication to every other day.  - Physical examination findings were not explicitly mentioned.  - Discussed the option of reducing the cholesterol medication dosage to 20 mg daily to further alleviate leg cramps.  - Prescription for 20 mg of cholesterol medication to be taken daily in the morning has been provided. The patient is advised to report if leg cramps persist.    2. Wheezing.  - The patient mentioned seasonal wheezing and requested a prescription for wheezing medication.  - No specific physical examination findings related to wheezing were noted.  - Reviewed the patient's history of wheezing and previous use of Symbicort.  - Prescription for generic Symbicort, 2 puffs twice daily, has been provided and sent to the pharmacy.    3. Health maintenance.  - The patient confirmed having a living will.  - The patient typically gets mammograms in the summer, between now and August.  - Blood work will be conducted today.         Follow Up   Return in about 6 months (around 11/27/2025) for Recheck.  Patient was given instructions and counseling regarding her condition or for health maintenance advice. Please see specific information pulled into the AVS if appropriate.  Patient or patient representative verbalized consent for the use of Ambient Listening during the visit with  Abebe Mancilla MD for chart documentation. 5/27/2025  10:22 EDT

## 2025-05-27 NOTE — PATIENT INSTRUCTIONS
Medicare Wellness  Personal Prevention Plan of Service     Date of Office Visit:    Encounter Provider:  Abebe Mancilla MD  Place of Service:  [facility]  Patient Name: Loulou Collier  :  1940    As part of the Medicare Wellness portion of your visit today, we are providing you with this personalized preventive plan of services (PPPS). This plan is based upon recommendations of the [place] Preventive Services Task Force (USPSTF) and the Advisory Committee on Immunization Practices (ACIP).    This lists the preventive care services that should be considered, and provides dates of when you are due. Items listed as completed are up-to-date and do not require any further intervention.    Health Maintenance   Topic Date Due    TDAP/TD VACCINES (1 - Tdap) Never done    INFLUENZA VACCINE  2025    LIPID PANEL  2025    ANNUAL WELLNESS VISIT  2026    DXA SCAN  2026    COLORECTAL CANCER SCREENING  08/15/2032    COVID-19 Vaccine  Completed    RSV Vaccine - Adults  Completed    Pneumococcal Vaccine 50+  Completed    ZOSTER VACCINE  Completed    MAMMOGRAM  Discontinued       Orders Placed This Encounter   Procedures    Mammo Screening Digital Tomosynthesis Bilateral With CAD     Standing Status:   Future     Expected Date:   2025     Expiration Date:   2026     Reason for Exam::   breast cancer acreen     Does this patient have a diabetic monitoring/medication delivering device on?:   No     Release to patient:   Routine Release [[id number]]    CK     Release to patient:   Routine Release [[id number]]    Urinalysis With Microscopic If Indicated (No Culture) - Urine, Clean Catch     Release to patient:   Routine Release [[id number]]    TSH     Release to patient:   Routine Release [[id number]]    T4, Free     Release to patient:   Routine Release [[id number]]    T3, Free     Release to patient:   Routine Release [[id number]]    Lipid Panel With / Chol /  HDL Ratio     Release to patient:   Routine Release [[id number]]    Comprehensive Metabolic Panel     Release to patient:   Routine Release [1400000002]    Hemoglobin A1c     Release to patient:   Routine Release [1400000002]    Sedimentation Rate     Release to patient:   Routine Release [1400000002]    Vitamin B12     Release to patient:   Routine Release [1400000002]    Vitamin D,25-Hydroxy     Release to patient:   Routine Release [1400000002]    Ambulatory Referral to Pulmonology     Referral Priority:   Routine     Referral Type:   Consultation     Referral Reason:   Specialty Services Required     Referred to Provider:   Jose Luis Toribio MD     Requested Specialty:   Pulmonary Disease     Number of Visits Requested:   1    CBC & Differential     Manual Differential:   No     Release to patient:   Routine Release [1400000002]       No follow-ups on file.

## 2025-05-28 LAB
25(OH)D3+25(OH)D2 SERPL-MCNC: 54.5 NG/ML (ref 30–100)
ALBUMIN SERPL-MCNC: 4.1 G/DL (ref 3.5–5.2)
ALBUMIN/GLOB SERPL: 2 G/DL
ALP SERPL-CCNC: 75 U/L (ref 39–117)
ALT SERPL-CCNC: 15 U/L (ref 1–33)
APPEARANCE UR: CLEAR
AST SERPL-CCNC: 14 U/L (ref 1–32)
BASOPHILS # BLD AUTO: 0.02 10*3/MM3 (ref 0–0.2)
BASOPHILS NFR BLD AUTO: 0.5 % (ref 0–1.5)
BILIRUB SERPL-MCNC: 0.4 MG/DL (ref 0–1.2)
BILIRUB UR QL STRIP: NEGATIVE
BUN SERPL-MCNC: 19 MG/DL (ref 8–23)
BUN/CREAT SERPL: 22.1 (ref 7–25)
CALCIUM SERPL-MCNC: 9.7 MG/DL (ref 8.6–10.5)
CHLORIDE SERPL-SCNC: 104 MMOL/L (ref 98–107)
CHOLEST SERPL-MCNC: 159 MG/DL (ref 0–200)
CHOLEST/HDLC SERPL: 2.69 {RATIO}
CK SERPL-CCNC: 122 U/L (ref 20–180)
CO2 SERPL-SCNC: 32.4 MMOL/L (ref 22–29)
COLOR UR: YELLOW
CREAT SERPL-MCNC: 0.86 MG/DL (ref 0.57–1)
EGFRCR SERPLBLD CKD-EPI 2021: 67.1 ML/MIN/1.73
EOSINOPHIL # BLD AUTO: 0.15 10*3/MM3 (ref 0–0.4)
EOSINOPHIL NFR BLD AUTO: 3.6 % (ref 0.3–6.2)
ERYTHROCYTE [DISTWIDTH] IN BLOOD BY AUTOMATED COUNT: 13.7 % (ref 12.3–15.4)
ERYTHROCYTE [SEDIMENTATION RATE] IN BLOOD BY WESTERGREN METHOD: 7 MM/HR (ref 0–30)
GLOBULIN SER CALC-MCNC: 2.1 GM/DL
GLUCOSE SERPL-MCNC: 84 MG/DL (ref 65–99)
GLUCOSE UR QL STRIP: NEGATIVE
HBA1C MFR BLD: 5.9 % (ref 4.8–5.6)
HCT VFR BLD AUTO: 41 % (ref 34–46.6)
HDLC SERPL-MCNC: 59 MG/DL (ref 40–60)
HGB BLD-MCNC: 12.9 G/DL (ref 12–15.9)
HGB UR QL STRIP: NEGATIVE
IMM GRANULOCYTES # BLD AUTO: 0.01 10*3/MM3 (ref 0–0.05)
IMM GRANULOCYTES NFR BLD AUTO: 0.2 % (ref 0–0.5)
KETONES UR QL STRIP: NEGATIVE
LDLC SERPL CALC-MCNC: 90 MG/DL (ref 0–100)
LEUKOCYTE ESTERASE UR QL STRIP: NEGATIVE
LYMPHOCYTES # BLD AUTO: 1.25 10*3/MM3 (ref 0.7–3.1)
LYMPHOCYTES NFR BLD AUTO: 29.6 % (ref 19.6–45.3)
MCH RBC QN AUTO: 27.8 PG (ref 26.6–33)
MCHC RBC AUTO-ENTMCNC: 31.5 G/DL (ref 31.5–35.7)
MCV RBC AUTO: 88.4 FL (ref 79–97)
MONOCYTES # BLD AUTO: 0.26 10*3/MM3 (ref 0.1–0.9)
MONOCYTES NFR BLD AUTO: 6.2 % (ref 5–12)
NEUTROPHILS # BLD AUTO: 2.53 10*3/MM3 (ref 1.7–7)
NEUTROPHILS NFR BLD AUTO: 59.9 % (ref 42.7–76)
NITRITE UR QL STRIP: NEGATIVE
NRBC BLD AUTO-RTO: 0 /100 WBC (ref 0–0.2)
PH UR STRIP: 7 [PH] (ref 5–8)
PLATELET # BLD AUTO: 185 10*3/MM3 (ref 140–450)
POTASSIUM SERPL-SCNC: 4.2 MMOL/L (ref 3.5–5.2)
PROT SERPL-MCNC: 6.2 G/DL (ref 6–8.5)
PROT UR QL STRIP: NEGATIVE
RBC # BLD AUTO: 4.64 10*6/MM3 (ref 3.77–5.28)
SODIUM SERPL-SCNC: 144 MMOL/L (ref 136–145)
SP GR UR STRIP: 1.02 (ref 1–1.03)
T3FREE SERPL-MCNC: 2.9 PG/ML (ref 2–4.4)
T4 FREE SERPL-MCNC: 1.1 NG/DL (ref 0.92–1.68)
TRIGL SERPL-MCNC: 46 MG/DL (ref 0–150)
TSH SERPL DL<=0.005 MIU/L-ACNC: 1.58 UIU/ML (ref 0.27–4.2)
UROBILINOGEN UR STRIP-MCNC: NORMAL MG/DL
VIT B12 SERPL-MCNC: 643 PG/ML (ref 211–946)
VLDLC SERPL CALC-MCNC: 10 MG/DL (ref 5–40)
WBC # BLD AUTO: 4.22 10*3/MM3 (ref 3.4–10.8)

## 2025-07-10 DIAGNOSIS — E53.8 B12 DEFICIENCY: ICD-10-CM

## 2025-07-10 DIAGNOSIS — E55.9 HYPOVITAMINOSIS D: ICD-10-CM

## 2025-07-10 DIAGNOSIS — R73.09 ELEVATED GLUCOSE: ICD-10-CM

## 2025-07-10 DIAGNOSIS — E78.89 ELEVATED HDL: ICD-10-CM

## 2025-07-10 DIAGNOSIS — E78.00 ELEVATED CHOLESTEROL: ICD-10-CM

## 2025-07-10 DIAGNOSIS — R25.2 LEG CRAMPS: ICD-10-CM

## 2025-07-10 RX ORDER — ROSUVASTATIN CALCIUM 20 MG/1
20 TABLET, COATED ORAL DAILY
Qty: 90 TABLET | Refills: 3 | Status: SHIPPED | OUTPATIENT
Start: 2025-07-10

## 2025-07-10 NOTE — TELEPHONE ENCOUNTER
Caller: Loulou Collier    Relationship: Self    Best call back number:     [phone]       Requested Prescriptions:   Requested Prescriptions     Pending Prescriptions Disp Refills    rosuvastatin (CRESTOR) 20 MG tablet 90 tablet 3     Sig: Take 1 tablet by mouth Daily.        Pharmacy where request should be sent:  [id number] [address]  AT [facility] [phone] [facility] [phone] FX     Last office visit with prescribing clinician: 5/27/2025   Last telemedicine visit with prescribing clinician: Visit date not found   Next office visit with prescribing clinician: 12/1/2025     Additional details provided by patient: PLEASE RESEND TO PHARMACY    Does the patient have less than a 3 day supply:  [x] Yes  [] No    Would you like a call back once the refill request has been completed: [] Yes [] No    If the office needs to give you a call back, can they leave a voicemail: [] Yes [] No    Matt Pride Rep   07/10/25 09:56 EDT

## 2025-08-25 ENCOUNTER — HOSPITAL ENCOUNTER (OUTPATIENT)
Dept: MAMMOGRAPHY | Facility: HOSPITAL | Age: 85
Discharge: HOME | End: 2025-08-25
Admitting: FAMILY MEDICINE
Payer: MEDICARE

## 2025-08-25 DIAGNOSIS — E78.89 ELEVATED HDL: ICD-10-CM

## 2025-08-25 DIAGNOSIS — R25.2 LEG CRAMPS: ICD-10-CM

## 2025-08-25 DIAGNOSIS — E78.00 ELEVATED CHOLESTEROL: ICD-10-CM

## 2025-08-25 DIAGNOSIS — E53.8 B12 DEFICIENCY: ICD-10-CM

## 2025-08-25 DIAGNOSIS — R73.09 ELEVATED GLUCOSE: ICD-10-CM

## 2025-08-25 DIAGNOSIS — Z12.31 ENCOUNTER FOR SCREENING MAMMOGRAM FOR MALIGNANT NEOPLASM OF BREAST: ICD-10-CM

## 2025-08-25 DIAGNOSIS — E55.9 HYPOVITAMINOSIS D: ICD-10-CM

## 2025-08-25 PROCEDURE — 77063 BREAST TOMOSYNTHESIS BI: CPT

## 2025-08-25 PROCEDURE — 77067 SCR MAMMO BI INCL CAD: CPT

## (undated) DEVICE — UNDERCAST PADDING: Brand: DEROYAL

## (undated) DEVICE — MAT FLR ABSORBENT LG 4FT 10 2.5FT

## (undated) DEVICE — GLV SURG SENSICARE MICRO PF LF 8 STRL

## (undated) DEVICE — PAD,ABDOMINAL,8"X10",ST,LF: Brand: MEDLINE

## (undated) DEVICE — ENCORE® LATEX ORTHO SIZE 8, STERILE LATEX POWDER-FREE SURGICAL GLOVE: Brand: ENCORE

## (undated) DEVICE — BNDG ELAS CO-FLEX SLF ADHR 6IN 5YD LF STRL

## (undated) DEVICE — STPLR SKIN VISISTAT WD 35CT

## (undated) DEVICE — GLV SURG TRIUMPH CLASSIC PF LTX 9 STRL

## (undated) DEVICE — CANN O2 ETCO2 FITS ALL CONN CO2 SMPL A/ 7IN DISP LF

## (undated) DEVICE — DUAL CUT SAGITTAL BLADE

## (undated) DEVICE — OCCLUSIVE GAUZE STRIP,3% BISMUTH TRIBROMOPHENATE IN PETROLATUM BLEND: Brand: XEROFORM

## (undated) DEVICE — KT ORCA ORCAPOD DISP STRL

## (undated) DEVICE — PREMIUM WET SKIN PREP TRAY: Brand: MEDLINE INDUSTRIES, INC.

## (undated) DEVICE — GAUZE,SPONGE,FLUFF,6"X6.75",STRL,10/TRAY: Brand: MEDLINE

## (undated) DEVICE — APPL CHLORAPREP W/TINT 26ML ORNG

## (undated) DEVICE — INSTRUMENT BATTERY

## (undated) DEVICE — COVER,MAYO STAND,STERILE: Brand: MEDLINE

## (undated) DEVICE — SYR LUERLOK 30CC

## (undated) DEVICE — PK KN TOTL 40

## (undated) DEVICE — ADAPT CLN BIOGUARD AIR/H2O DISP

## (undated) DEVICE — LN SMPL CO2 SHTRM SD STREAM W/M LUER

## (undated) DEVICE — SENSR O2 OXIMAX FNGR A/ 18IN NONSTR

## (undated) DEVICE — BNDG ELAS ELITE V/CLOSE 6IN 5YD LF STRL

## (undated) DEVICE — ENCORE® LATEX ORTHO SIZE 9, STERILE LATEX POWDER-FREE SURGICAL GLOVE: Brand: ENCORE

## (undated) DEVICE — TUBING, SUCTION, 1/4" X 10', STRAIGHT: Brand: MEDLINE

## (undated) DEVICE — P.F.C. DRILL BIT AND STEINMAN PIN PACKET (1 UNIT) .125IN DIA 5IN LGTH: Brand: P.F.C.

## (undated) DEVICE — SUT VICRYL 1 CT1 27IN  JJ40G

## (undated) DEVICE — GLV SURG TRIUMPH CLASSIC PF LTX 8 STRL

## (undated) DEVICE — NDL SPINE 18G 31/2IN PNK